# Patient Record
Sex: FEMALE | Race: WHITE | NOT HISPANIC OR LATINO | Employment: UNEMPLOYED | URBAN - NONMETROPOLITAN AREA
[De-identification: names, ages, dates, MRNs, and addresses within clinical notes are randomized per-mention and may not be internally consistent; named-entity substitution may affect disease eponyms.]

---

## 2017-02-14 ENCOUNTER — AMBULATORY - GICH (OUTPATIENT)
Dept: FAMILY MEDICINE | Facility: OTHER | Age: 57
End: 2017-02-14

## 2017-02-14 DIAGNOSIS — Z23 ENCOUNTER FOR IMMUNIZATION: ICD-10-CM

## 2017-04-26 ENCOUNTER — COMMUNICATION - GICH (OUTPATIENT)
Dept: FAMILY MEDICINE | Facility: OTHER | Age: 57
End: 2017-04-26

## 2017-04-26 DIAGNOSIS — N39.0 URINARY TRACT INFECTION: ICD-10-CM

## 2017-05-02 ENCOUNTER — COMMUNICATION - GICH (OUTPATIENT)
Dept: FAMILY MEDICINE | Facility: OTHER | Age: 57
End: 2017-05-02

## 2017-05-02 DIAGNOSIS — K21.9 GASTRO-ESOPHAGEAL REFLUX DISEASE WITHOUT ESOPHAGITIS: ICD-10-CM

## 2017-05-13 ENCOUNTER — COMMUNICATION - GICH (OUTPATIENT)
Dept: FAMILY MEDICINE | Facility: OTHER | Age: 57
End: 2017-05-13

## 2017-05-13 DIAGNOSIS — E78.00 PURE HYPERCHOLESTEROLEMIA: ICD-10-CM

## 2017-06-12 ENCOUNTER — COMMUNICATION - GICH (OUTPATIENT)
Dept: FAMILY MEDICINE | Facility: OTHER | Age: 57
End: 2017-06-12

## 2017-06-12 DIAGNOSIS — Z86.79 PERSONAL HISTORY OF OTHER DISEASES OF THE CIRCULATORY SYSTEM (CODE): ICD-10-CM

## 2017-07-12 ENCOUNTER — HISTORY (OUTPATIENT)
Dept: INTERNAL MEDICINE | Facility: OTHER | Age: 57
End: 2017-07-12

## 2017-07-12 ENCOUNTER — OFFICE VISIT - GICH (OUTPATIENT)
Dept: INTERNAL MEDICINE | Facility: OTHER | Age: 57
End: 2017-07-12

## 2017-07-12 DIAGNOSIS — K21.9 GASTRO-ESOPHAGEAL REFLUX DISEASE WITHOUT ESOPHAGITIS: ICD-10-CM

## 2017-07-12 DIAGNOSIS — J45.30 MILD PERSISTENT ASTHMA, UNCOMPLICATED: ICD-10-CM

## 2017-07-12 DIAGNOSIS — M15.0 PRIMARY GENERALIZED (OSTEO)ARTHRITIS: ICD-10-CM

## 2017-07-12 DIAGNOSIS — F41.8 OTHER SPECIFIED ANXIETY DISORDERS: ICD-10-CM

## 2017-07-12 DIAGNOSIS — K76.0 FATTY (CHANGE OF) LIVER, NOT ELSEWHERE CLASSIFIED: ICD-10-CM

## 2017-07-12 DIAGNOSIS — Z00.00 ENCOUNTER FOR GENERAL ADULT MEDICAL EXAMINATION WITHOUT ABNORMAL FINDINGS: ICD-10-CM

## 2017-07-12 DIAGNOSIS — E78.00 PURE HYPERCHOLESTEROLEMIA: ICD-10-CM

## 2017-07-12 DIAGNOSIS — G56.01 CARPAL TUNNEL SYNDROME OF RIGHT WRIST: ICD-10-CM

## 2017-07-12 DIAGNOSIS — Z79.899 OTHER LONG TERM (CURRENT) DRUG THERAPY: ICD-10-CM

## 2017-07-12 DIAGNOSIS — Z86.79 PERSONAL HISTORY OF OTHER DISEASES OF THE CIRCULATORY SYSTEM (CODE): ICD-10-CM

## 2017-07-12 LAB
A/G RATIO - HISTORICAL: 1.7 (ref 1–2)
ALBUMIN SERPL-MCNC: 4.5 G/DL (ref 3.5–5.7)
ALP SERPL-CCNC: 79 IU/L (ref 34–104)
ALT (SGPT) - HISTORICAL: 44 IU/L (ref 7–52)
ANION GAP - HISTORICAL: 11 (ref 5–18)
AST SERPL-CCNC: 27 IU/L (ref 13–39)
BILIRUB SERPL-MCNC: 0.4 MG/DL (ref 0.3–1)
BUN SERPL-MCNC: 17 MG/DL (ref 7–25)
BUN/CREAT RATIO - HISTORICAL: 25
CALCIUM SERPL-MCNC: 9.7 MG/DL (ref 8.6–10.3)
CHLORIDE SERPLBLD-SCNC: 102 MMOL/L (ref 98–107)
CHOL/HDL RATIO - HISTORICAL: 3.52
CHOLESTEROL TOTAL: 243 MG/DL
CO2 SERPL-SCNC: 26 MMOL/L (ref 21–31)
CREAT SERPL-MCNC: 0.67 MG/DL (ref 0.7–1.3)
GFR IF NOT AFRICAN AMERICAN - HISTORICAL: >60 ML/MIN/1.73M2
GLOBULIN - HISTORICAL: 2.7 G/DL (ref 2–3.7)
GLUCOSE SERPL-MCNC: 109 MG/DL (ref 70–105)
HDLC SERPL-MCNC: 69 MG/DL (ref 23–92)
LDLC SERPL CALC-MCNC: 150 MG/DL
MAGNESIUM SERPL-MCNC: 2.1 MG/DL (ref 1.9–2.7)
NON-HDL CHOLESTEROL - HISTORICAL: 174 MG/DL
PATIENT STATUS - HISTORICAL: ABNORMAL
POTASSIUM SERPL-SCNC: 4.7 MMOL/L (ref 3.5–5.1)
PROT SERPL-MCNC: 7.2 G/DL (ref 6.4–8.9)
SODIUM SERPL-SCNC: 139 MMOL/L (ref 133–143)
TRIGL SERPL-MCNC: 120 MG/DL

## 2017-07-12 ASSESSMENT — ANXIETY QUESTIONNAIRES
1. FEELING NERVOUS, ANXIOUS, OR ON EDGE: MORE THAN HALF THE DAYS
6. BECOMING EASILY ANNOYED OR IRRITABLE: NOT AT ALL
3. WORRYING TOO MUCH ABOUT DIFFERENT THINGS: MORE THAN HALF THE DAYS
2. NOT BEING ABLE TO STOP OR CONTROL WORRYING: SEVERAL DAYS
7. FEELING AFRAID AS IF SOMETHING AWFUL MIGHT HAPPEN: SEVERAL DAYS
5. BEING SO RESTLESS THAT IT IS HARD TO SIT STILL: SEVERAL DAYS
4. TROUBLE RELAXING: SEVERAL DAYS
GAD7 TOTAL SCORE: 8

## 2017-07-12 ASSESSMENT — PATIENT HEALTH QUESTIONNAIRE - PHQ9: SUM OF ALL RESPONSES TO PHQ QUESTIONS 1-9: 5

## 2017-08-11 ENCOUNTER — COMMUNICATION - GICH (OUTPATIENT)
Dept: FAMILY MEDICINE | Facility: OTHER | Age: 57
End: 2017-08-11

## 2017-08-11 DIAGNOSIS — E78.00 PURE HYPERCHOLESTEROLEMIA: ICD-10-CM

## 2017-12-09 ENCOUNTER — COMMUNICATION - GICH (OUTPATIENT)
Dept: INTERNAL MEDICINE | Facility: OTHER | Age: 57
End: 2017-12-09

## 2017-12-09 DIAGNOSIS — Z86.79 PERSONAL HISTORY OF OTHER DISEASES OF THE CIRCULATORY SYSTEM (CODE): ICD-10-CM

## 2017-12-28 NOTE — TELEPHONE ENCOUNTER
Patient Information     Patient Name MRN Sex Nikki Long [id number] Female 1960      Telephone Encounter by Catarina Stock RN at 2017  9:00 AM     Author:  Catarina Stock RN Service:  (none) Author Type:  NURS- Registered Nurse     Filed:  2017  9:02 AM Encounter Date:  2017 Status:  Signed     :  Catarina Stock RN (NURS- Registered Nurse)            Beta Blockers     Office visit in the past 12 months or per provider note.    Last visit with JENNIFER BENNETT was on: 2016 in [facility]  Next visit with JENNIFER BENNETT is on: No future appointment listed with this provider  Next visit with Family Practice is on: No future appointment listed in this department    Max refill for 12 months from last office visit or per provider note.    Patient is due for medication management appointment. Limited refill provided at this time. [employer] message and/or letter recently sent for reminder to patient. Prescription refilled per RN Medication Refill Policy.................... Catarina Stock RN ....................  2017   9:01 AM

## 2017-12-28 NOTE — PROGRESS NOTES
Patient Information     Patient Name MRN Sex Nikki Long [id number] Female 1960      Progress Notes by Noris Medellin DO at 2017  8:20 AM     Author:  Noris Medellin DO Service:  (none) Author Type:  PHYS- Osteopathic     Filed:  2017  7:58 AM Encounter Date:  2017 Status:  Signed     :  Noris Medellin DO (PHYS- Osteopathic)            Please see H&P from same date.

## 2017-12-28 NOTE — TELEPHONE ENCOUNTER
Patient Information     Patient Name MRN Nikki Hector [id number] Female 1960      Telephone Encounter by Catarina Stock RN at 8/15/2017  8:31 AM     Author:  Catarina Stock RN Service:  (none) Author Type:  NURS- Registered Nurse     Filed:  8/15/2017  8:39 AM Encounter Date:  2017 Status:  Signed     :  Catarina Stock RN (NURS- Registered Nurse)            Redundant Refill Request refused;  Medication:atorvastatin (LIPITOR) 20 mg tablet    Qty:90 tablet   Ref:4  Start:2017       Sig:Take 1 tablet by mouth once daily.    Pharmacy:[place] [id number] [place] [facility], [place] - [address]  Unable to complete prescription refill per RN Medication Refill Policy.................... Catarina Stock RN ....................  8/15/2017   8:33 AM

## 2017-12-29 NOTE — H&P
Patient Information     Patient Name MRN Nikki Hector [id number] Female 1960      H&P by Noris Medellin DO at 2017  8:20 AM     Author:  Noris Medellin DO Service:  (none) Author Type:  PHYS- Osteopathic     Filed:  2017  7:58 AM Encounter Date:  2017 Status:  Signed     :  Noris Medellin DO (PHYS- Osteopathic)            Chief Complaint     Patient presents with       Establish Care      with Dr. Medellin       HPI: Ms. Lezama is a 57 y.o. female who presents today for yearly physical and to establish care.  She overall is feeling good.      She has been having some increased pain in her hands.  She does believe this is arthritis.  She has not been taking much for medication for this.  Does report some numbness in the first 3 and a half digits of her right hand when she rides bike.  This started in the last month.  She denies any outright weakness.    In reviewing her medical issues,    (K76.0) Fatty liver disease, nonalcoholic - she does have a history of fatty liver disease with her last enzymes showing elevation of AST and ALT.  She is interested in weight loss overall.  She has worked with this in the past.  She does recognize she needs to watch her diet.  She is not interested in seeing nutrition at this time.    (K21.9) Gastroesophageal reflux disease, esophagitis presence not specified - she is currently on omeprazole.  She is not interested in stopping at this time as she has had problems with this in the past.  She is aware of the increased risk including memory, kidney or absorption issues.  She is willing to continue to consider decrease in the future.    (Z86.79) History of supraventricular tachycardia - she is currently on metoprolol for this.  She did have an episode of SVT in the last couple weeks.  It did spontaneously resolved.  She denies any other symptoms including chest pain or shortness of breath with this.    (F41.8) Situational anxiety - she does have some  anxiety with flying.  She will have the flight coming up this fall and is a curious about refilling her Xanax.    (J45.30) Mild persistent asthma without complication - she is currently on as needed albuterol along with montelukast.  She denies any side effects at this time.  She did recently have a cold however is recovering.    (E78.00) Pure hypercholesterolemia - started on low-dose Lipitor in the last year.  She has not had repeat lipids done yet.  She does feel she has some achiness but does not know if this is from the atorvastatin.  She denies any other side effects.    She is up-to-date on her colonoscopy.  She is up-to-date on her vaccines however will need tetanus and shingles at age 58.  She is unable to do mammogram scheduled of her history of Lino's and implants.  She has previously done MRIs.  We will plan to repeat this at age 58.  She is up-to-date on her Pap smear.    History is discussed and updated on 7/12/2017 with patient.  It is current to the best of my knowledge as below.    Past Medical History:     Diagnosis  Date     Amblyopia 1962    left eye; patching and vision therapy.      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      History of supraventricular tachycardia 1/12/2007    controlled with metoprolol      Hyperlipidemia 6/2/2014     Mechanical complication due to breast prosthesis 2/29/2008    ruptured implants      Mild persistent asthma without complication 2/29/2008     Perennial allergic rhinitis 1/12/2007     Primary female infertility     gonadal dysgenesis      Sensorineural hearing loss, asymmetrical 10/02/2008    left ear      Squamous cell cancer of skin of nose 2004        Past Surgical History:      Procedure  Laterality Date     BREAST AUGMENTATION       BREAST  BIOPSY  1980,1982    benign       BUNIONETTE EXCISION       COLONOSCOPY SCREENING  4/19/2010    hemorrhoids o/w nl to TI;rep 10yrs       CYST REMOVAL       LIPOSUCTION       MIDDLE EAR SURGERY       OOPHORECTOMY      streak ovaries       SKIN CANCER EXCISION       TOE SURGERY      tumor removal from 1st toe on right       TONSILLECTOMY           Current Outpatient Prescriptions     Medication  Sig     albuterol HFA (VENTOLIN HFA) 90 mcg/actuation inhaler Inhale 2 Puffs by mouth every 4 hours if needed.     ALPRAZolam (XANAX) 0.5 mg tablet Take 1 tablet by mouth 2 times daily if needed for Anxiety or Other (Specify) (for flying).     aspirin enteric coated 81 mg tablet Take 1 tablet by mouth once daily with a meal.     atorvastatin (LIPITOR) 20 mg tablet Take 1 tablet by mouth once daily.     coenzyme q10 (COQ-10) 100 mg cap Take 1 capsule by mouth once daily.     fexofenadine (ALLEGRA) 180 mg tablet Take 1 tablet by mouth once daily.     Glucosamine &Chondroit-MV-Min3 014-179-07-0.5 mg tab Take 1 tablet by mouth once daily.     Inhalational Spacing Device (AEROCHAMBER MINI) For home use.     krill oil 500 mg cap Take  by mouth.     lactobacillus acidophilus (PROBIOTIC) 10 billion cell cap Take 1 capsule by mouth.     metoprolol succinate (TOPROL XL) 25 mg Sustained-Release tablet Take 1 tablet by mouth once daily.     montelukast (SINGULAIR) 10 mg tablet Take 1 tablet by mouth once daily.     multivitamin (MVI) tablet Take 1 tablet by mouth once daily.     omeprazole (PRILOSEC) 20 mg Delayed-Release capsule Take 1 capsule by mouth once daily before a meal.     No current facility-administered medications for this visit.      Medications have been reviewed by me and are current to the best of my knowledge and ability.       Allergies      Allergen   Reactions     Cats (Fur, Dander, Saliva)  Runny Nose     Dust Mites  Runny Nose and Other - Describe In Comment Field     Sneezing, watery eyes      Ragweed  Runny Nose      Sneezing, watery eyes         Family History       Problem   Relation Age of Onset     Thyroid Disease  Mother      Heart Disease  Mother      Cancer  Mother      skin       Diabetes  Mother      Hyperlipidemia  Mother      Hypertension  Mother      Stroke  Mother      Diabetes  Father      Heart Disease  Father      Hyperlipidemia  Father      Hypertension  Father      Stroke  Father 73     Heart Disease  Brother      triple bypass        Heart Disease  Brother      MI       Diabetes  Sister      Obesity  Sister      Diabetes  Sister      Obesity  Sister      Heart Disease  Sister      Asthma  Sister      Hypertension  Sister      Diabetes  Maternal Grandmother      Stroke  Maternal Grandmother      Diabetes  Paternal Grandmother      Stroke  Paternal Grandfather      Anesthesia Problem  No Family History      Blood Disease  No Family History        Family Status     Relation  Status     Mother  at age 82    heart disease, CVA, dementia, DM      Father  at age 74    DM. CAD, CVA      Brother Alive    CABG, HTN, acoustic neuroma      Brother Alive    stress, overweight      Sister Alive    obesity, scoliosis, heart disease, DM, HTN      Sister Alive    obesity, DM      Sister Alive    asthma, reflux, colitis      Maternal Grandmother      Paternal Grandmother      Paternal Grandfather      No Family History         Social History     Social History        Marital status:       Spouse name: N/A     Number of children:  N/A     Years of education:  N/A     Social History Main Topics          Smoking status:   Never Smoker      Smokeless tobacco:   Never Used      Alcohol use   0.0 oz/week     0 Standard drinks or equivalent per week        Comment: 3 per week       Drug use:   No      Sexual activity:   Yes      Partners:  Male       Comment: monogamous       Other Topics  Concern     Not on file      Social History Narrative     Moved to [place] 2014. 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    2 adopted children with special needs.                      ROS  GEN: -fevers/-chills/-night sweats/+wt change - 5 pound weight gain in the past year   NEURO: -headaches/-vision changes  EARS: -hearing changes/+ chronic tinnitus  NOSE: -drainage/-congestion  MOUTH/THROAT: - sore throat/-dysphagia/-sores  LUNGS: + Occasional sob/-cough  CARDIOVASCULAR: -cp/-palpitations  GI: -pain/-diarrhea/-constipation/-bloody stools  : -dysuria/-hematuria  ENDOCRINE: -skin or hair changes/-hot-cold intolerance  HEMATOLOGIC/LYMPHATIC: -swollen nodes/-easy bruising  SKIN: -rashes/-lesions  MSK/RHEUM: +joint pain/-swelling  NEURO: -weakness/-parasthesias  PSYCH: -anhedonia/-depression/-anxiety         EXAM:   /76  Pulse 72  Temp 97.6  F (36.4  C) (Tympanic)  Resp 18  Ht 1.556 m (5' 1.25\")  Wt 79.4 kg (175 lb)  Breastfeeding? No  BMI 32.8 kg/m2  Estimated body mass index is 32.8 kg/(m^2) as calculated from the following:    Height as of this encounter: 1.556 m (5' 1.25\").    Weight as of this encounter: 79.4 kg (175 lb).      GEN: Vitals reviewed.  Healthy appearing. Patient is in no acute distress. Cooperative with exam.  HEENT: Normocephalic atraumatic.  Normal external eye, conjunctiva, lids, cornea with no scleral icterus or conjunctival erythema. Pupils equally round. Oropharynx with no erythema or exudates. Dentition adequate.    NECK: Supple; no thyromegaly or masses noted.  No cervical or supraclavicular lymphadenopathy.  CV: Heart regular in rate and rhythm with no murmur.  Radial and posterior tibial pulses palpable.  LUNGS: Lungs clear to auscultation bilaterally.  Chest rise equal bilaterally.  No accessory muscle use.  ABD:  Obese, Soft, nondistended .  No rebound. Bowel sounds positive.  SKIN: Warm and dry to touch.  No rash on face, arms and legs.  EXT: No clubbing or cyanosis.  No peripheral edema.  NEURO: Alert and oriented to person, place, and time.  Cranial nerves II-XII grossly intact with " no focal or lateralizing deficits.  Muscle tone normal.  Gait normal. No tremor. Sensation intact to light touch.  MSK: ROM of upper and lower ext symmetric and full.  PSYCH: Affect appropriate. Speech fluent. Answers questions appropriately and thought process normal.    LABS: 7/12/2017 - Personally ordered/reviewed  Results for orders placed or performed in visit on 07/12/17      LIPID PANEL      Result  Value Ref Range    CHOLESTEROL,TOTAL 243 (H) <200 mg/dL    TRIGLYCERIDES 120 <150 mg/dL    HDL CHOLESTEROL 69 23 - 92 mg/dL    NON-HDL CHOLESTEROL 174 (H) <145 mg/dl    CHOL/HDL RATIO            3.52 <4.50                    LDL CHOLESTEROL 150 (H) <100 mg/dL    PATIENT STATUS            FASTING                   COMP METABOLIC PANEL      Result  Value Ref Range    SODIUM 139 133 - 143 mmol/L    POTASSIUM 4.7 3.5 - 5.1 mmol/L    CHLORIDE 102 98 - 107 mmol/L    CO2,TOTAL 26 21 - 31 mmol/L    ANION GAP 11 5 - 18                    GLUCOSE 109 (H) 70 - 105 mg/dL    CALCIUM 9.7 8.6 - 10.3 mg/dL    BUN 17 7 - 25 mg/dL    CREATININE 0.67 (L) 0.70 - 1.30 mg/dL    BUN/CREAT RATIO           25                    GFR if African American >60 >60 ml/min/1.73m2    GFR if not African American >60 >60 ml/min/1.73m2    ALBUMIN 4.5 3.5 - 5.7 g/dL    PROTEIN,TOTAL 7.2 6.4 - 8.9 g/dL    GLOBULIN                  2.7 2.0 - 3.7 g/dL    A/G RATIO 1.7 1.0 - 2.0                    BILIRUBIN,TOTAL 0.4 0.3 - 1.0 mg/dL    ALK PHOSPHATASE 79 34 - 104 IU/L    ALT (SGPT) 44 7 - 52 IU/L    AST (SGOT) 27 13 - 39 IU/L   MAGNESIUM      Result  Value Ref Range    MAGNESIUM 2.1 1.9 - 2.7 mg/dL             ASSESSMENT AND PLAN:    1. Fatty liver disease, nonalcoholic  - liver enzymes appear to have improved at this time.  We will continue to monitor at least yearly.  - COMP METABOLIC PANEL    2. Gastroesophageal reflux disease, esophagitis presence not specified  - Continue medication as prescribed  - Discussed the need to taper off of PPI when able  and discussed alternatives including Tums and H2 blocker  - Encouraged to avoid caffeine, NSAIDS, chocolate, alcohol, spicy food, red sauce; consider raising the head of bed 10-15 degrees; do not eat within 2-3 hours of bedtime  - Return/call as needed for follow-up should any new symptoms develop, for worsening of current symptoms or if symptoms do not resolve with above plan.  - omeprazole (PRILOSEC) 20 mg Delayed-Release capsule; Take 1 capsule by mouth once daily before a meal.  Dispense: 90 capsule; Refill: 4    3. History of supraventricular tachycardia  - Stable.  Continue current regimen.    - metoprolol succinate (TOPROL XL) 25 mg Sustained-Release tablet; Take 1 tablet by mouth once daily.  Dispense: 90 tablet; Refill: 0    4. Situational anxiety  - Stable.  Continue current regimen.    - ALPRAZolam (XANAX) 0.5 mg tablet; Take 1 tablet by mouth 2 times daily if needed for Anxiety or Other (Specify) (for flying).  Dispense: 10 tablet; Refill: 0    5. Mild persistent asthma without complication  - no indication of exacerbation at this time  - patient is on meds and Inhalers, continue for now  - montelukast (SINGULAIR) 10 mg tablet; Take 1 tablet by mouth once daily.  Dispense: 90 tablet; Refill: 4    6. Pure hypercholesterolemia  - lipids checked and good  - On statin with no clinical evidence or complaint of myalgias or other ADRs  - Discussed importance of exercise and diet.  - Ms. Lezama's Body mass index is 32.8 kg/(m^2). This is out of the normal range for a 57 y.o. Normal range for ages 18+ is between 18.5 and 24.9. To lose weight we reviewed risks and benefits of appropriate options such as diet, exercise, and medications. Patient's strategy will be  self-directed nutrition plan and self-directed exercise program  - LIPID PANEL  - atorvastatin (LIPITOR) 20 mg tablet; Take 1 tablet by mouth once daily.  Dispense: 90 tablet; Refill: 4    7. Primary osteoarthritis involving multiple joints  -  over-the-counter medications as needed.    8. Carpal tunnel syndrome of right wrist  - Ice, gentle movement/rest as tolerated  - Ibuprofen, Naproxen or Tylenol as needed  - exercises given.  Patient is to call she feel she needs further intervention with physical therapy  - she was informed that she could use a brace if she desired.  - patient is to call if she has additional problems with this or if new symptoms develop    9. High risk medication use  - check magnesium due to use of proton pump inhibitor  - MAGNESIUM        Colonoscopy:  up to date  Breast Exam/Mammography: Repeat MRI in 1 year  Pap smear: Never any abnormal, repeat 2021  DEXA: 865  Immunizations: UTD on other vaccines.   Lipids/Annual Exam: As needed to manage cholesterol  Hepatitis C screen: Nonreactive 2016            Return in about 1 year (around 7/12/2018) for Annual Exam.         Patient Instructions      Index [place] Exercises Related topics   Carpal Tunnel Syndrome   ________________________________________________________________________  KEY POINTS    Carpal tunnel syndrome is problem with pain, numbness, and tingling in your wrist, hand, and fingers. Carpal tunnel syndrome is a common problem for cashiers, , assembly-line workers, and people who work on a keyboard or with a computer mouse.    You may need to stop doing activities that make your symptoms worse.    Treatment may include stretching and strengthening exercises, medicine, wearing a wrist splint, or surgery.    Your healthcare provider may recommend stretching and strengthening exercises to help you heal.  ________________________________________________________________________  What is carpal tunnel syndrome?   Carpal tunnel syndrome is problem with pain, numbness, and tingling in your wrist, hand, and fingers. The carpal tunnel is a narrow space in your forearm and the palm of your hand. It is made up of bone and other tissue. The median nerve passes through the  carpal tunnel to your thumb, index finger, and middle finger.  What is the cause?  The exact cause of carpal tunnel syndrome is not known. It is more common in women than in men.  It may be that pressure, irritation, swelling, or blood flow problems in the carpal tunnel damage the median nerve. Irritation and pressure may come from using your hand and wrist in the same motion over and over. For example, carpal tunnel syndrome is a common problem for cashiers, , assembly-line workers, and people who work on a keyboard or with a computer mouse. Or a broken bone or other injury may damage or put pressure on the nerve.   You may have a higher risk of carpal tunnel syndrome if someone in your family has this problem, or if you are pregnant or have a disease like rheumatoid arthritis, diabetes, or a thyroid problem. Some medicines used to treat breast cancer in women may cause carpal tunnel syndrome.  What are the symptoms?   The main symptoms are pain, numbness, or tingling in your hand and wrist, especially in the thumb and the index and middle fingers. The pain may:    Come and go    Get worse the more you use your hand    Be worse at night    Feel better if you shake your hand and wrist  Your hand may feel weak. It may be hard for you to grab things and hold onto them with your hand. You may have trouble knowing if something is hot or cold when you touch it.   How is it diagnosed?   Your healthcare provider will ask about your symptoms, medical history, and the ways you use your hands. Your provider will examine you. He or she may tap the middle of your inner wrist or ask you to bend your wrist down for 1 minute to see if either of these tests causes pain or tingling. Your provider may refer you to a specialist for tests to check your nerves. In some cases you may have an ultrasound or MRI scan.  How is it treated?   If you have a disease that may be causing carpal tunnel syndrome, like arthritis, diabetes,  or a thyroid problem, treating the disease may help your symptoms.  To relieve pressure on the nerve your healthcare provider may suggest that you:    Rest your hand and wrist and avoid activities that may make your symptoms worse.    Wear a wrist splint to avoid more damage from twisting or bending.  In some cases your provider may prescribe medicine for pain and swelling or give a shot of steroid or numbing medicine into your wrist.   Your healthcare provider may recommend stretching and strengthening exercises to help you heal.   Your provider may recommend surgery if your symptoms don t get better with these treatments.  How can I take care of myself?   Some of the things you can do to help your symptoms include:    Put your arm up on pillows when you sit or lie down.    If your work involves using tools, try using a different tool, or try to use the other hand instead.  Follow your healthcare provider's instructions, including any exercises recommended by your provider. Ask your provider:    How and when you will get your test results    How long it will take to recover    If there are activities you should avoid and when you can return to your normal activities    How to take care of yourself at home    What symptoms or problems you should watch for and what to do if you have them  Make sure you know when you should come back for a checkup. Keep all appointments for provider visits or tests.  How can I help prevent carpal tunnel syndrome?  Because the exact cause of carpal tunnel is not well understood, it can be hard to prevent. Here are some things you can do that may help:    Make sure that your hands and wrists are supported when you use them, especially if you do repetitive work. For example, when you are typing or using a mouse at a computer, make sure your workstation is set up in the proper position and that your hands are comfortably supported in front of the keyboard. It may help to use a pad that is  specially designed to give this kind of support. Also check to see that your forearms are at the same level as your keyboard.    Make sure that the tools you use are not too large for your hand. Try to use tools with a cushioned , or wear gloves when you use them. Some workplaces provide tools or equipment that may help you avoid carpal tunnel syndrome. Ask your employer about this.    Take frequent breaks from your work and do carpal tunnel exercises.  It s not clear, however, that making these changes will help prevent carpal tunnel syndrome.  Developed by [employer].  Adult Advisor 2016.3 published by [employer].  Last modified: 2016-05-25  Last reviewed: 2014-10-06  This content is reviewed periodically and is subject to change as new health information becomes available. The information is intended to inform and educate and is not a replacement for medical evaluation, advice, diagnosis or treatment by a healthcare professional.  References   Adult Advisor 2016.3 Index    Copyright   2016 [employer], a division of [employer] All rights reserved.          Index [place]   Carpal Tunnel Syndrome Exercises   Your healthcare provider may recommend exercises to help you heal. Talk to your healthcare provider or physical therapist about which exercises will best help you and how to do them correctly and safely.    Wrist range of motion  1. Flexion and extension: Gently bend your wrist forward and backward. Do 2 sets of 10.  2. Side to side: Gently move your wrist from side to side (a handshake motion). Do 2 sets of 10.    Wrist stretch: Stretch the hand on your injured side back by pressing the fingers in a backward direction. Hold for 15 seconds. Keep the arm on your injured side straight during this exercise. Do 3 sets.    Mid-trap exercise: Lie on your stomach on a firm surface and place a folded pillow underneath your chest. Place your arms out straight to your sides with your elbows straight  and thumbs toward the ceiling. Slowly raise your arms toward the ceiling as you squeeze your shoulder blades together. Lower slowly. Do 2 sets of 15. As the exercise gets easier to do, hold soup cans or small weights in your hands.    Pectoralis stretch:  an open doorway or corner with both hands slightly above your head on the door frame or wall. Slowly lean forward until you feel a stretch in the front of your shoulders. Hold for 15 to 30 seconds. Repeat 3 times.    [place] stretch: Sit or stand and clasp both hands behind your back. Lower your left shoulder and tilt your head toward the right until you feel a stretch. Hold this position for 15 seconds and then come back to the starting position. Then lower your right shoulder and tilt your head toward the left. Hold for 15 seconds. Repeat 2 times on each side.    Thoracic extension: Sit in a chair and clasp both arms behind your head. Gently arch backward and look up toward the ceiling. Repeat 10 times. Do this several times each day.    Scapular squeeze: While sitting or standing with your arms by your sides, squeeze your shoulder blades together and hold for 5 seconds. Do 2 sets of 15.    Wrist extension: Hold a soup can or small weight in your hand with your palm facing down. Slowly bend your wrist up. Slowly lower the weight down into the starting position. Do 2 sets of 10. Gradually increase the weight of the object you are holding.    Shoulder abduction: Stand with your arms at your sides. Rest your palms against your sides. Hold a 2 to 4-pound (1 to 2 kilogram) weight in each hand. Keeping your arms straight, lift your arms out to the side and toward the ceiling. Hold this position for 5 seconds and then slowly bring your arms down. Do 2 sets of 8 to 12.    Tendon glide: Do these exercises 3 times per day, 5 repetitions each.  1. Hold your hand with your fingers straight and your palm facing away from you. Hold this position for 5 seconds.  2. Flex  the tips of your fingers and try to make a  claw  position. Hold 5 seconds.  3. Return to Step 1 and hold 5 seconds.  4. Make a full fist with your fingers. Hold 5 seconds.  5. Return to Step 1 and hold 5 seconds.  6. Keep your fingers straight, but bend only your knuckles, trying to make a duck-bill shape with your hand. Hold 5 seconds.  7. Return to Step 1 and hold 5 seconds.  8. Try to touch only your finger tips to your palm without making a full fist. Hold 5 seconds.  9. Return to Step 1.  Developed by [employer].  Adult Advisor 2016.3 published by [employer].  Last modified: 2016-04-28  Last reviewed: 2016-04-28  This content is reviewed periodically and is subject to change as new health information becomes available. The information is intended to inform and educate and is not a replacement for medical evaluation, advice, diagnosis or treatment by a healthcare professional.  References   Adult Advisor 2016.3 Index    Copyright   2016 [employer], a division of [employer] All rights reserved.               ULISSES PIRES DO   7/12/2017 9:20 AM    This document was prepared using voice generated softwear. While every attempt was made for accuracy, grammatical errors may exist.

## 2017-12-29 NOTE — PATIENT INSTRUCTIONS
Patient Information     Patient Name MRN Nikki Hector [id number] Female 1960      Patient Instructions by Noris Medellin DO at 2017  8:20 AM     Author:  Noris Medellin DO Service:  (none) Author Type:  PHYS- Osteopathic     Filed:  2017  9:09 AM Encounter Date:  2017 Status:  Signed     :  Noris Medellin DO (PHYS- Osteopathic)               Index Malaysian Exercises Related topics   Carpal Tunnel Syndrome   ________________________________________________________________________  KEY POINTS    Carpal tunnel syndrome is problem with pain, numbness, and tingling in your wrist, hand, and fingers. Carpal tunnel syndrome is a common problem for cashiers, , assembly-line workers, and people who work on a keyboard or with a computer mouse.    You may need to stop doing activities that make your symptoms worse.    Treatment may include stretching and strengthening exercises, medicine, wearing a wrist splint, or surgery.    Your healthcare provider may recommend stretching and strengthening exercises to help you heal.  ________________________________________________________________________  What is carpal tunnel syndrome?   Carpal tunnel syndrome is problem with pain, numbness, and tingling in your wrist, hand, and fingers. The carpal tunnel is a narrow space in your forearm and the palm of your hand. It is made up of bone and other tissue. The median nerve passes through the carpal tunnel to your thumb, index finger, and middle finger.  What is the cause?  The exact cause of carpal tunnel syndrome is not known. It is more common in women than in men.  It may be that pressure, irritation, swelling, or blood flow problems in the carpal tunnel damage the median nerve. Irritation and pressure may come from using your hand and wrist in the same motion over and over. For example, carpal tunnel syndrome is a common problem for cashiers, , assembly-line workers, and people  who work on a keyboard or with a computer mouse. Or a broken bone or other injury may damage or put pressure on the nerve.   You may have a higher risk of carpal tunnel syndrome if someone in your family has this problem, or if you are pregnant or have a disease like rheumatoid arthritis, diabetes, or a thyroid problem. Some medicines used to treat breast cancer in women may cause carpal tunnel syndrome.  What are the symptoms?   The main symptoms are pain, numbness, or tingling in your hand and wrist, especially in the thumb and the index and middle fingers. The pain may:    Come and go    Get worse the more you use your hand    Be worse at night    Feel better if you shake your hand and wrist  Your hand may feel weak. It may be hard for you to grab things and hold onto them with your hand. You may have trouble knowing if something is hot or cold when you touch it.   How is it diagnosed?   Your healthcare provider will ask about your symptoms, medical history, and the ways you use your hands. Your provider will examine you. He or she may tap the middle of your inner wrist or ask you to bend your wrist down for 1 minute to see if either of these tests causes pain or tingling. Your provider may refer you to a specialist for tests to check your nerves. In some cases you may have an ultrasound or MRI scan.  How is it treated?   If you have a disease that may be causing carpal tunnel syndrome, like arthritis, diabetes, or a thyroid problem, treating the disease may help your symptoms.  To relieve pressure on the nerve your healthcare provider may suggest that you:    Rest your hand and wrist and avoid activities that may make your symptoms worse.    Wear a wrist splint to avoid more damage from twisting or bending.  In some cases your provider may prescribe medicine for pain and swelling or give a shot of steroid or numbing medicine into your wrist.   Your healthcare provider may recommend stretching and strengthening  exercises to help you heal.   Your provider may recommend surgery if your symptoms don t get better with these treatments.  How can I take care of myself?   Some of the things you can do to help your symptoms include:    Put your arm up on pillows when you sit or lie down.    If your work involves using tools, try using a different tool, or try to use the other hand instead.  Follow your healthcare provider's instructions, including any exercises recommended by your provider. Ask your provider:    How and when you will get your test results    How long it will take to recover    If there are activities you should avoid and when you can return to your normal activities    How to take care of yourself at home    What symptoms or problems you should watch for and what to do if you have them  Make sure you know when you should come back for a checkup. Keep all appointments for provider visits or tests.  How can I help prevent carpal tunnel syndrome?  Because the exact cause of carpal tunnel is not well understood, it can be hard to prevent. Here are some things you can do that may help:    Make sure that your hands and wrists are supported when you use them, especially if you do repetitive work. For example, when you are typing or using a mouse at a computer, make sure your workstation is set up in the proper position and that your hands are comfortably supported in front of the keyboard. It may help to use a pad that is specially designed to give this kind of support. Also check to see that your forearms are at the same level as your keyboard.    Make sure that the tools you use are not too large for your hand. Try to use tools with a cushioned , or wear gloves when you use them. Some workplaces provide tools or equipment that may help you avoid carpal tunnel syndrome. Ask your employer about this.    Take frequent breaks from your work and do carpal tunnel exercises.  It s not clear, however, that making these  changes will help prevent carpal tunnel syndrome.  Developed by [employer].  Adult Advisor 2016.3 published by [employer].  Last modified: 2016-05-25  Last reviewed: 2014-10-06  This content is reviewed periodically and is subject to change as new health information becomes available. The information is intended to inform and educate and is not a replacement for medical evaluation, advice, diagnosis or treatment by a healthcare professional.  References   Adult Advisor 2016.3 Index    Copyright   2016 [employer], a division of [employer] All rights reserved.          Index Portuguese   Carpal Tunnel Syndrome Exercises   Your healthcare provider may recommend exercises to help you heal. Talk to your healthcare provider or physical therapist about which exercises will best help you and how to do them correctly and safely.    Wrist range of motion  1. Flexion and extension: Gently bend your wrist forward and backward. Do 2 sets of 10.  2. Side to side: Gently move your wrist from side to side (a handshake motion). Do 2 sets of 10.    Wrist stretch: Stretch the hand on your injured side back by pressing the fingers in a backward direction. Hold for 15 seconds. Keep the arm on your injured side straight during this exercise. Do 3 sets.    Mid-trap exercise: Lie on your stomach on a firm surface and place a folded pillow underneath your chest. Place your arms out straight to your sides with your elbows straight and thumbs toward the ceiling. Slowly raise your arms toward the ceiling as you squeeze your shoulder blades together. Lower slowly. Do 2 sets of 15. As the exercise gets easier to do, hold soup cans or small weights in your hands.    Pectoralis stretch:  an open doorway or corner with both hands slightly above your head on the door frame or wall. Slowly lean forward until you feel a stretch in the front of your shoulders. Hold for 15 to 30 seconds. Repeat 3 times.    Scalene stretch: Sit or  stand and clasp both hands behind your back. Lower your left shoulder and tilt your head toward the right until you feel a stretch. Hold this position for 15 seconds and then come back to the starting position. Then lower your right shoulder and tilt your head toward the left. Hold for 15 seconds. Repeat 2 times on each side.    Thoracic extension: Sit in a chair and clasp both arms behind your head. Gently arch backward and look up toward the ceiling. Repeat 10 times. Do this several times each day.    Scapular squeeze: While sitting or standing with your arms by your sides, squeeze your shoulder blades together and hold for 5 seconds. Do 2 sets of 15.    Wrist extension: Hold a soup can or small weight in your hand with your palm facing down. Slowly bend your wrist up. Slowly lower the weight down into the starting position. Do 2 sets of 10. Gradually increase the weight of the object you are holding.    Shoulder abduction: Stand with your arms at your sides. Rest your palms against your sides. Hold a 2 to 4-pound (1 to 2 kilogram) weight in each hand. Keeping your arms straight, lift your arms out to the side and toward the ceiling. Hold this position for 5 seconds and then slowly bring your arms down. Do 2 sets of 8 to 12.    Tendon glide: Do these exercises 3 times per day, 5 repetitions each.  1. Hold your hand with your fingers straight and your palm facing away from you. Hold this position for 5 seconds.  2. Flex the tips of your fingers and try to make a  claw  position. Hold 5 seconds.  3. Return to Step 1 and hold 5 seconds.  4. Make a full fist with your fingers. Hold 5 seconds.  5. Return to Step 1 and hold 5 seconds.  6. Keep your fingers straight, but bend only your knuckles, trying to make a duck-bill shape with your hand. Hold 5 seconds.  7. Return to Step 1 and hold 5 seconds.  8. Try to touch only your finger tips to your palm without making a full fist. Hold 5 seconds.  9. Return to Step  1.  Developed by [employer].  Adult Advisor 2016.3 published by [employer].  Last modified: 2016-04-28  Last reviewed: 2016-04-28  This content is reviewed periodically and is subject to change as new health information becomes available. The information is intended to inform and educate and is not a replacement for medical evaluation, advice, diagnosis or treatment by a healthcare professional.  References   Adult Advisor 2016.3 Index    Copyright   2016 [employer], a division of [employer] All rights reserved.

## 2017-12-30 NOTE — NURSING NOTE
Patient Information     Patient Name MRN Sex Nikki Long [id number] Female 1960      Nursing Note by Tahira Rush at 2017  8:20 AM     Author:  Tahira Rush Service:  (none) Author Type:  (none)     Filed:  2017  8:35 AM Encounter Date:  2017 Status:  Signed     :  Tahira Rush            Patient presents to clinic for establish care appointment with DO. Tahira Cho LPN..................2017  8:30 AM

## 2018-01-03 NOTE — NURSING NOTE
Patient Information     Patient Name MRN Nikki Hector [id number] Female 1960      Nursing Note by Bobo Sawyer RN at 2017  2:30 PM     Author:  Bobo Sawyer RN Service:  (none) Author Type:  NURS- Registered Nurse     Filed:  2017  2:26 PM Encounter Date:  2017 Status:  Signed     :  Bobo Sawyer RN (NURS- Registered Nurse)            Pt denies allergies to yeast gelatin neosporin eggs thimerasol or latex or past reactions to vaccinations. Copy of [facility] given.  BOBO SAWYER RN ....................  2017   2:18 PM

## 2018-01-04 NOTE — TELEPHONE ENCOUNTER
Patient Information     Patient Name MRN Nikki Hector [id number] Female 1960      Telephone Encounter by Nanette Tripathi RN at 2017  1:52 PM     Author:  Nanette Tripathi RN Service:  (none) Author Type:  NURS- Registered Nurse     Filed:  2017  1:54 PM Encounter Date:  2017 Status:  Signed     :  Nanette Tripathi RN (NURS- Registered Nurse)            Proton Pump Inhibitors    Office visit in the past 12 months or per provider note.    Last visit with JENNIFER BENNETT was on: 2016 in [facility]  Next visit with JENNIFER BENNETT is on: No future appointment listed with this provider  Next visit with Family Practice is on: No future appointment listed in this department    Max refill for 12 months from last office visit or per provider note.    Had Pre-op PX with Magi Mclean 16. Will refill X1 and send a letter to patient to make annual appt. Prescription refilled per RN Medication Refill Policy.................... NANETTE TRIPATHI RN ....................  2017   1:52 PM

## 2018-01-04 NOTE — TELEPHONE ENCOUNTER
Patient Information     Patient Name MRN Nikki Hector [id number] Female 1960      Telephone Encounter by Leidy Chavez at 2017  2:23 PM     Author:  Leidy Chavez Service:  (none) Author Type:  (none)     Filed:  2017  2:36 PM Encounter Date:  2017 Status:  Signed     :  Leidy Chavez            I called patient back to give her Dr. Owen's message. The patient states that she has a history of recurring UTI's and had an outstanding prescription that just  last week. She believes she does have a UTI right now. Patient said she was seeing Dr. Clarke for microscopic hematuria and that he was the one who suggested the continuing refills of the antibiotic. I suggested setting up an appointment with a provider to follow up since [facility] will be out. She said she would rather not come into town though. She will try calling Dr. Clarke to see if he can help.   Leidy MCCARTY, CMA 2017

## 2018-01-04 NOTE — TELEPHONE ENCOUNTER
Patient Information     Patient Name MRN Sex Nikki Long [id number] Female 1960      Telephone Encounter by Paul Owen MD at 2017  1:47 PM     Author:  Paul Owen MD Service:  (none) Author Type:  Physician     Filed:  2017  1:47 PM Encounter Date:  2017 Status:  Signed     :  Paul Owen MD (Physician)            [facility] will need to meet with new provider to determine if new provider will give prescription without ua

## 2018-01-04 NOTE — TELEPHONE ENCOUNTER
Patient Information     Patient Name MRN Nikki Hector [id number] Female 1960      Telephone Encounter by Catarina Stock RN at 2017  3:49 PM     Author:  Catarina Stock RN Service:  (none) Author Type:  NURS- Registered Nurse     Filed:  2017  3:51 PM Encounter Date:  2017 Status:  Signed     :  Catarina Stock RN (NURS- Registered Nurse)            Refill request already responded to.  Catarina Stock RN........2017 3:51 PM

## 2018-01-04 NOTE — TELEPHONE ENCOUNTER
"Patient Information     Patient Name MRN Nikki Hector [id number] Female 1960      Telephone Encounter by Catarina Stock RN at 2017  7:56 AM     Author:  Catarina Stock RN Service:  (none) Author Type:  NURS- Registered Nurse     Filed:  2017  8:27 AM Encounter Date:  2017 Status:  Signed     :  Catarina Stock RN (NURS- Registered Nurse)            In clinical absence of patient's primary, Jennifer Bennett MD, patient is requesting that this message be sent to the Doc of the Day for consideration please.      Patient states she feels like she is getting a UTI and was given Bactrim to keep on had if one should develop. She states she currently has a bottle, but no refills and would like to make sure she has some on hand for the next time she needs them. Please [facility] if appropriate.    Patient is aware that Jennifer Bennett MD is unavailable and that she needs to establish care with a new provider for all future needs.    This is a Refill request from: patient   Name of Medication: Bactrim  Quantity requested: 6  Last fill date: 2016  Due for refill: yes  Last visit with JENNIFER BENNETT was on: 2016 in [facility]  PCP:  Jennifer Bennett MD  Controlled Substance Agreement:  n/a   Diagnosis r/t this medication request: recurrent UTI     Unable to complete prescription refill per RN Medication Refill Policy.................... Catarina Stock RN ....................  2017   8:20 AM      Answer Assessment - Initial Assessment Questions  1. SYMPTOM: \"What's the main symptom you're concerned about?\" (e.g., frequency, incontinence)      Frequency and urgency  2. ONSET: \"When did the  ________  start?\"      yesterday  3. PAIN: \"Is there any pain?\" If so, ask: \"How bad is it?\" (Scale: 1-10; mild, moderate, severe)      no  4. CAUSE: \"What do you think is causing the symptoms?\"      UTI  5. OTHER SYMPTOMS: \"Do you have " "any other symptoms?\" (e.g., fever, flank pain, blood in urine, pain with urination)      no  6. PREGNANCY: \"Is there any chance you are pregnant?\" \"When was your last menstrual period?\"      postmenopausal    Protocols used: ADULT URINARY SYMPTOMS-A-AH            "

## 2018-01-04 NOTE — TELEPHONE ENCOUNTER
Patient Information     Patient Name MRN Nikki Hector [id number] Female 1960      Telephone Encounter by Leidy Chavez at 2017  2:58 PM     Author:  Leidy Chavez Service:  (none) Author Type:  (none)     Filed:  2017  3:00 PM Encounter Date:  2017 Status:  Signed     :  Leidy Chavez            Called patient to notify her of prescription. I spoke with her  who said he notified her because the pharmacy called him.   Leidy MCCARTY, [facility] 2017

## 2018-01-05 NOTE — TELEPHONE ENCOUNTER
Patient Information     Patient Name MRN Sex Nikki Long [id number] Female 1960      Telephone Encounter by Catarina Stock RN at 5/15/2017  9:57 AM     Author:  Catarina Stock RN Service:  (none) Author Type:  NURS- Registered Nurse     Filed:  5/15/2017 10:03 AM Encounter Date:  2017 Status:  Signed     :  Catarina Stock RN (NURS- Registered Nurse)            Statins    Office visit in the past 12 months.    Last visit with JENNIFER BENNETT was on: 2016 in [facility]  Next visit with JENNIFER BENNETT is on: No future appointment listed with this provider  Next visit with Family Practice is on: No future appointment listed in this department    Last Lipids:  Chol: 338    2016  T    2016  HDL:   70    2016  LDL:  245    2016  LDL DIRECT:  No results found in past 5 years    .    Max refills 12 months from last office visit.      Patient is due for medication management appointment. Limited refill provided at this time. [employer] message and/or letter sent recently for reminder to patient. Prescription refilled per RN Medication Refill Policy.................... Catarina Stock RN ....................  5/15/2017   10:02 AM

## 2018-01-25 VITALS
DIASTOLIC BLOOD PRESSURE: 76 MMHG | HEIGHT: 61 IN | RESPIRATION RATE: 18 BRPM | BODY MASS INDEX: 33.04 KG/M2 | TEMPERATURE: 97.6 F | WEIGHT: 175 LBS | HEART RATE: 72 BPM | SYSTOLIC BLOOD PRESSURE: 112 MMHG

## 2018-01-29 ASSESSMENT — ANXIETY QUESTIONNAIRES: GAD7 TOTAL SCORE: 8

## 2018-01-29 ASSESSMENT — PATIENT HEALTH QUESTIONNAIRE - PHQ9: SUM OF ALL RESPONSES TO PHQ QUESTIONS 1-9: 5

## 2018-02-02 ENCOUNTER — DOCUMENTATION ONLY (OUTPATIENT)
Dept: FAMILY MEDICINE | Facility: OTHER | Age: 58
End: 2018-02-02

## 2018-02-02 PROBLEM — Z00.00 HEALTH CARE MAINTENANCE: Status: ACTIVE | Noted: 2017-07-17

## 2018-02-02 RX ORDER — UBIDECARENONE 100 MG
100 CAPSULE ORAL DAILY
COMMUNITY
Start: 2013-10-30

## 2018-02-02 RX ORDER — ATORVASTATIN CALCIUM 20 MG/1
20 TABLET, FILM COATED ORAL DAILY
COMMUNITY
Start: 2017-07-12 | End: 2018-09-12

## 2018-02-02 RX ORDER — ALBUTEROL SULFATE 90 UG/1
2 AEROSOL, METERED RESPIRATORY (INHALATION) EVERY 4 HOURS PRN
COMMUNITY
Start: 2016-04-12 | End: 2018-09-12

## 2018-02-02 RX ORDER — MONTELUKAST SODIUM 10 MG/1
10 TABLET ORAL DAILY
COMMUNITY
Start: 2017-07-12 | End: 2018-07-15

## 2018-02-02 RX ORDER — FEXOFENADINE HCL 180 MG/1
180 TABLET ORAL DAILY
COMMUNITY
Start: 2010-08-16

## 2018-02-02 RX ORDER — DIPHENOXYLATE HYDROCHLORIDE AND ATROPINE SULFATE 2.5; .025 MG/1; MG/1
1 TABLET ORAL DAILY
COMMUNITY
End: 2021-06-02

## 2018-02-02 RX ORDER — INHALER, ASSIST DEVICES
SPACER (EA) MISCELLANEOUS
COMMUNITY
Start: 2015-02-10 | End: 2021-08-06

## 2018-02-02 RX ORDER — ALPRAZOLAM 0.5 MG
0.5 TABLET ORAL 2 TIMES DAILY PRN
COMMUNITY
Start: 2017-07-12 | End: 2018-09-12

## 2018-02-02 RX ORDER — ASPIRIN 81 MG/1
81 TABLET ORAL
COMMUNITY
End: 2020-06-22

## 2018-02-02 RX ORDER — METOPROLOL SUCCINATE 25 MG/1
25 TABLET, EXTENDED RELEASE ORAL DAILY
COMMUNITY
Start: 2017-12-11 | End: 2018-07-02

## 2018-02-09 ENCOUNTER — COMMUNICATION - GICH (OUTPATIENT)
Dept: INTERNAL MEDICINE | Facility: OTHER | Age: 58
End: 2018-02-09

## 2018-02-09 DIAGNOSIS — E78.5 HYPERLIPIDEMIA: ICD-10-CM

## 2018-02-09 DIAGNOSIS — Z79.899 OTHER LONG TERM (CURRENT) DRUG THERAPY: ICD-10-CM

## 2018-02-12 NOTE — TELEPHONE ENCOUNTER
Patient Information     Patient Name MRN Sex Nikki Long [id number] Female 1960      Telephone Encounter by Tahira Cat RN at 2017  3:40 PM     Author:  Tahira Cat RN Service:  (none) Author Type:  NURS- Registered Nurse     Filed:  2017  3:44 PM Encounter Date:  2017 Status:  Signed     :  Tahira Cat RN (NURS- Registered Nurse)            Beta Blockers     Office visit in the past 12 months or per provider note.    Last visit with ULISSES PIRES was on: 2017 in [facility]  Next visit with ULISSES PIRES is on: No future appointment listed with this provider  Next visit with Internal Medicine is on: No future appointment listed in this department    Max refill for 12 months from last office visit or per provider note.  Prescription refilled per RN Medication Refill Policy.................... Tahira Cat RN ....................  2017   3:43 PM

## 2018-02-13 NOTE — TELEPHONE ENCOUNTER
Patient Information     Patient Name MRN Nikki Hector [id number] Female 1960      Telephone Encounter by Meseret Joyner at 2018  8:35 AM     Author:  Meseret Joyner  Service:  (none) Author Type:  (none)     Filed:  2018  8:53 AM  Encounter Date:  2018 Status:  Addendum     :  Meseret Joyner        Related Notes: Original Note by Meseret Joyner filed at 2018  8:37 AM            PATIENT ARRIVED FOR APPOINTMENT AT 0830. SHE HAS MISSED APPT BUT WOULD LIKE [facility] TO PUT IN ORDER FOR CHOLESTEROL LABS AS SHE IS FASTING NOW AND WOULD LIKE TO DO THAT.  SHE IS WAITING IN THE LOBBY.  PLEASE LET US KNOW.    Meseret oJyner ....................  2018   8:37 AM    PATIENT IS NO LONGER WAITING.  SHE HAD TO LEAVE.  PLEASE CALL WHEN ORDER IS DONE.  Meseret Joyner ....................  2018   8:53 AM

## 2018-02-13 NOTE — TELEPHONE ENCOUNTER
Patient Information     Patient Name MRN Sex Nikki Long [id number] Female 1960      Telephone Encounter by Alana Butterfield LPN at 2018 11:15 AM     Author:  Alana Butterfield LPN Service:  (none) Author Type:  NURS- Licensed Practical Nurse     Filed:  2018 11:17 AM Encounter Date:  2018 Status:  Signed     :  Alana Butterfield LPN (NURS- Licensed Practical Nurse)            Verified patient's last name and date of birth. Notified of the orders below. She will call back to reschedule lab only and her appointment.  Alana Butterfield LPN.........2018   11:17 AM

## 2018-02-13 NOTE — TELEPHONE ENCOUNTER
Patient Information     Patient Name MRN Sex Nikki Long [id number] Female 1960      Telephone Encounter by Noris Medellin DO at 2018 11:09 AM     Author:  Noris Medellin DO Service:  (none) Author Type:  PHYS- Osteopathic     Filed:  2018 11:10 AM Encounter Date:  2018 Status:  Signed     :  Noris Medellin DO (PHYS- Osteopathic)            Orders placed.  I will watch for the results.  If she would like to wait to reschedule until her annual exam in July this would be reasonable unless she has other concerns.

## 2018-02-21 DIAGNOSIS — E78.5 HYPERLIPEMIA: ICD-10-CM

## 2018-02-21 DIAGNOSIS — Z79.899 HIGH RISK MEDICATION USE: ICD-10-CM

## 2018-02-21 LAB
ANION GAP SERPL CALCULATED.3IONS-SCNC: 8 MMOL/L (ref 3–14)
BUN SERPL-MCNC: 18 MG/DL (ref 7–25)
CALCIUM SERPL-MCNC: 9 MG/DL (ref 8.6–10.3)
CHLORIDE SERPL-SCNC: 104 MMOL/L (ref 98–107)
CHOLEST SERPL-MCNC: 194 MG/DL
CO2 SERPL-SCNC: 25 MMOL/L (ref 21–31)
CREAT SERPL-MCNC: 0.66 MG/DL (ref 0.6–1.2)
GFR SERPL CREATININE-BSD FRML MDRD: >90 ML/MIN/1.7M2
GLUCOSE SERPL-MCNC: 106 MG/DL (ref 70–105)
HDLC SERPL-MCNC: 65 MG/DL (ref 23–92)
LDLC SERPL CALC-MCNC: 108 MG/DL
NONHDLC SERPL-MCNC: 129 MG/DL
POTASSIUM SERPL-SCNC: 3.9 MMOL/L (ref 3.5–5.1)
SODIUM SERPL-SCNC: 137 MMOL/L (ref 134–144)
TRIGL SERPL-MCNC: 107 MG/DL

## 2018-02-21 PROCEDURE — 36415 COLL VENOUS BLD VENIPUNCTURE: CPT | Performed by: INTERNAL MEDICINE

## 2018-02-21 PROCEDURE — 80048 BASIC METABOLIC PNL TOTAL CA: CPT | Performed by: INTERNAL MEDICINE

## 2018-02-21 PROCEDURE — 80061 LIPID PANEL: CPT | Performed by: INTERNAL MEDICINE

## 2018-02-25 ENCOUNTER — HEALTH MAINTENANCE LETTER (OUTPATIENT)
Age: 58
End: 2018-02-25

## 2018-02-27 DIAGNOSIS — N39.0 RECURRENT UTI: Primary | ICD-10-CM

## 2018-03-06 RX ORDER — SULFAMETHOXAZOLE/TRIMETHOPRIM 800-160 MG
TABLET ORAL
Qty: 6 TABLET | Refills: 0 | Status: SHIPPED | OUTPATIENT
Start: 2018-03-06 | End: 2018-03-09

## 2018-03-06 NOTE — TELEPHONE ENCOUNTER
PLEASE REVIEW, SIGN AND SEND AS APPROPRIATE: THANK YOU.    PATIENT IS REQUESTING REFILL TO HAVE ON HAND- HOPING TO  THUR AS IS LEAVING FOR [place] EARLY FRI.    Called and spoke to Patient after verifying last name and date of birth. Patient states she normally sees Dr. Clarke for microscopic hematuria and he normally prescribes this medication for her to have as a back-up. Patient states it was filled last by Dr. Owen because Dr. Clarke was out of the clinic.    Patient is requesting refill for back up while she is on her trip to [place]. Patient will need to be able to pick Rx up Thursday as she is leaving early Friday AM.    Sulfamethoxazole-trimethroprim (BACTRIM DS/SEPTRA DS) 800-160 mg per tablet   Last Written Prescription Date:  4/26/2017 (D/C 7/12/17; reason: med complete)  Last Fill Quantity: 6 tablet,   # refills: 1  Last Office Visit: 7/12/17 (est. Care with Dr. Medellin), 5/26/2016 (Dr. Clarke)  Future Office visit:   None noted    Routing refill request to provider for review/approval because:  Drug not active on patient's medication list    Unable to complete prescription refill per RN Medication Refill Policy. Tahira Salmeron RN .............. 3/6/2018  8:40 AM

## 2018-03-26 ENCOUNTER — OFFICE VISIT (OUTPATIENT)
Dept: UROLOGY | Facility: OTHER | Age: 58
End: 2018-03-26
Attending: UROLOGY
Payer: COMMERCIAL

## 2018-03-26 VITALS — RESPIRATION RATE: 14 BRPM | HEIGHT: 62 IN | SYSTOLIC BLOOD PRESSURE: 120 MMHG | DIASTOLIC BLOOD PRESSURE: 78 MMHG

## 2018-03-26 DIAGNOSIS — R39.15 URINARY URGENCY: Primary | ICD-10-CM

## 2018-03-26 DIAGNOSIS — N39.0 FREQUENT UTI: ICD-10-CM

## 2018-03-26 LAB
ALBUMIN UR-MCNC: NEGATIVE MG/DL
APPEARANCE UR: CLEAR
BACTERIA #/AREA URNS HPF: ABNORMAL /HPF
BILIRUB UR QL STRIP: NEGATIVE
COLOR UR AUTO: YELLOW
GLUCOSE UR STRIP-MCNC: NEGATIVE MG/DL
HGB UR QL STRIP: ABNORMAL
KETONES UR STRIP-MCNC: NEGATIVE MG/DL
LEUKOCYTE ESTERASE UR QL STRIP: NEGATIVE
NITRATE UR QL: NEGATIVE
PH UR STRIP: 6 PH (ref 5–7)
RBC #/AREA URNS AUTO: ABNORMAL /HPF
SOURCE: ABNORMAL
SP GR UR STRIP: 1.02 (ref 1–1.03)
UROBILINOGEN UR STRIP-ACNC: 0.2 EU/DL (ref 0.2–1)
WBC #/AREA URNS AUTO: ABNORMAL /HPF

## 2018-03-26 PROCEDURE — 51798 US URINE CAPACITY MEASURE: CPT | Performed by: UROLOGY

## 2018-03-26 PROCEDURE — 81001 URINALYSIS AUTO W/SCOPE: CPT | Performed by: UROLOGY

## 2018-03-26 PROCEDURE — 99213 OFFICE O/P EST LOW 20 MIN: CPT | Mod: 25 | Performed by: UROLOGY

## 2018-03-26 RX ORDER — SULFAMETHOXAZOLE/TRIMETHOPRIM 800-160 MG
1 TABLET ORAL 2 TIMES DAILY
Qty: 6 TABLET | Refills: 2 | Status: SHIPPED | OUTPATIENT
Start: 2018-03-26 | End: 2019-06-24

## 2018-03-26 ASSESSMENT — PAIN SCALES - GENERAL: PAINLEVEL: MILD PAIN (3)

## 2018-03-26 NOTE — PROGRESS NOTES
Type of Visit  Established    Chief Complaint  Frequent UTIs    HPI  Ms. Lezama is a 58 year old female with frequent UTIs.  Her UTI symptoms are typically very clear to her: urgency, dysuria.  She travels frequently for vacation destinations.  She denies symptoms today.  She has had 2 UTIs since the new year.  She has successfully treated the UTIs with on demand Bactrim.  She has treated a UTI 4-5 times in the last year.      Review of Systems  I reviewed the ROS with the patient.    Nursing Notes:   Leidy Ceron LPN  3/26/2018  1:22 PM  Signed  Here for urinary urgency and recurrent UTI.    Post-Void Residual  A post-void residual was measured by ultrasonic bladder scanner.  0 mL  Review of Systems:    Weight loss:    No     Recent fever/chills:  No   Night sweats:   No  Current skin rash:  No   Recent hair loss:  No  Heat intolerance:  No   Cold intolerance:  No  Chest pain:   No   Palpitations:   No  Shortness of breath:  No   Wheezing:   No  Constipation:    No   Diarrhea:   No   Nausea:   No   Vomiting:   No   Kidney/side pain:  No   Back pain:   Yes  Frequent headaches:  Yes   Dizziness:     No  Leg swelling:   No   Calf pain:    No    Leidy Ceron LPN on 3/26/2018 at 1:14 PM      Family History  Family History   Problem Relation Age of Onset     Thyroid Disease Mother      Thyroid Disease     HEART DISEASE Mother      Heart Disease     CANCER Mother      Cancer,skin     DIABETES Mother      Diabetes     Hyperlipidemia Mother      Hyperlipidemia     Hypertension Mother      Hypertension     Other - See Comments Mother      Stroke     DIABETES Father      Diabetes     HEART DISEASE Father      Heart Disease     Hyperlipidemia Father      Hyperlipidemia     Hypertension Father      Hypertension     Other - See Comments Father 73     Stroke     Asthma Sister      Asthma     HEART DISEASE Brother      Heart Disease     Hyperlipidemia Brother      Hyperlipidemia     Hyperlipidemia Sister       "Hyperlipidemia     DIABETES Maternal Grandmother      Diabetes     Other - See Comments Maternal Grandmother      Stroke     DIABETES Paternal Grandmother      Diabetes     Asthma Sister      Asthma     Hypertension Sister      Hypertension     Other - See Comments Paternal Grandfather      Stroke     Anesthesia Reaction No family hx of      Anesthesia Problem     Blood Disease No family hx of      Blood Disease       Physical Exam  Vitals:    03/26/18 1314   BP: 120/78   BP Location: Right arm   Patient Position: Sitting   Cuff Size: Adult Large   Resp: 14   Height: 1.562 m (5' 1.5\")     Constitutional: NAD, WDWN.   Cardiovascular: Regular rate.  Pulmonary/Chest: Respirations are even and non-labored bilaterally.  Abdominal: Soft. No distension, tenderness, masses or guarding. No CVA tenderness.  Genitourinary: Nonpalpable bladder.  Extremities: JIMI x 4, Warm. No clubbing.  No cyanosis.    Skin: Pink, warm and dry.  No rashes noted.    Labs  Results for orders placed or performed in visit on 02/21/18   Lipid Profile (Chol, Trig, HDL, LDL calc)   Result Value Ref Range    Cholesterol 194 <200 mg/dL    Triglycerides 107 <150 mg/dL    HDL Cholesterol 65 23 - 92 mg/dL    LDL Cholesterol Calculated 108 (H) <100 mg/dL    Non HDL Cholesterol 129 <130 mg/dL   Basic metabolic panel  (Ca, Cl, CO2, Creat, Gluc, K, Na, BUN)   Result Value Ref Range    Sodium 137 134 - 144 mmol/L    Potassium 3.9 3.5 - 5.1 mmol/L    Chloride 104 98 - 107 mmol/L    Carbon Dioxide 25 21 - 31 mmol/L    Anion Gap 8 3 - 14 mmol/L    Glucose 106 (H) 70 - 105 mg/dL    Urea Nitrogen 18 7 - 25 mg/dL    Creatinine 0.66 0.60 - 1.20 mg/dL    GFR Estimate >90 >60 mL/min/1.7m2    GFR Estimate If Black >90 >60 mL/min/1.7m2    Calcium 9.0 8.6 - 10.3 mg/dL     Results for NOLA HUITRON (MRN [id number]) as of 3/26/2018 13:24   3/26/2018 13:03   Color Urine Yellow   Appearance Urine Clear   Glucose Urine Negative   Bilirubin Urine Negative   Ketones Urine " Negative   Specific Gravity Urine 1.025   pH Urine 6.0   Protein Albumin Urine Negative   Urobilinogen Urine 0.2   Nitrite Urine Negative   Blood Urine Small (A)   Leukocyte Esterase Urine Negative   Source PENDING     Post-Void Residual  A post-void residual was measured by ultrasonic bladder scanner.  0 mL    Assessment  Ms. Lezama is a 58 year old female with frequent UTIs.  She is doing well with on demand therapy.    Plan  Bactrim x 3 days on demand  Follow up in 1 year

## 2018-03-26 NOTE — NURSING NOTE
Here for urinary urgency and recurrent UTI.    Post-Void Residual  A post-void residual was measured by ultrasonic bladder scanner.  0 mL  Review of Systems:    Weight loss:    No     Recent fever/chills:  No   Night sweats:   No  Current skin rash:  No   Recent hair loss:  No  Heat intolerance:  No   Cold intolerance:  No  Chest pain:   No   Palpitations:   No  Shortness of breath:  No   Wheezing:   No  Constipation:    No   Diarrhea:   No   Nausea:   No   Vomiting:   No   Kidney/side pain:  No   Back pain:   Yes  Frequent headaches:  Yes   Dizziness:     No  Leg swelling:   No   Calf pain:    No    Leidy Ceron LPN on 3/26/2018 at 1:14 PM

## 2018-03-26 NOTE — MR AVS SNAPSHOT
"              After Visit Summary   3/26/2018    Nikki Lezama    MRN: [id number]           Patient Information     Date Of Birth          1960        Visit Information        Provider Department      3/26/2018 1:00 PM Jim Calrke MD [facility]        Today's Diagnoses     Urinary urgency    -  1    Frequent UTI           Follow-ups after your visit        Who to contact     If you have questions or need follow up information about today's clinic visit or your schedule please contact [facility] directly at [phone].  Normal or non-critical lab and imaging results will be communicated to you by [employer], letter or phone within 4 business days after the clinic has received the results. If you do not hear from us within 7 days, please contact the clinic through [employer] or phone. If you have a critical or abnormal lab result, we will notify you by phone as soon as possible.  Submit refill requests through [employer] or call your pharmacy and they will forward the refill request to us. Please allow 3 business days for your refill to be completed.          Additional Information About Your Visit        MyChart Information     [employer] gives you secure access to your electronic health record. If you see a primary care provider, you can also send messages to your care team and make appointments. If you have questions, please call your primary care clinic.  If you do not have a primary care provider, please call [phone] and they will assist you.        Care EveryWhere ID     This is your Care EveryWhere ID. This could be used by other organizations to access your [facility] medical records  [id number]        Your Vitals Were     Respirations Height                14 1.562 m (5' 1.5\")           Blood Pressure from Last 3 Encounters:   03/26/18 120/78   07/12/17 112/76   07/11/16 118/72    Weight from Last 3 Encounters:   07/12/17 79.4 kg (175 lb)   07/11/16 77.2 kg (170 " lb 4 oz)   05/25/16 77.5 kg (170 lb 12.8 oz)              We Performed the Following     POST-VOID RESIDUAL BLADDER SCAN     UA reflex to Microscopic     Urine Microscopic          Today's Medication Changes          These changes are accurate as of 3/26/18  1:33 PM.  If you have any questions, ask your nurse or doctor.               Start taking these medicines.        Dose/Directions    sulfamethoxazole-trimethoprim 800-160 MG per tablet   Commonly known as:  BACTRIM DS   Used for:  Frequent UTI   Started by:  Jim Clarke MD        Dose:  1 tablet   Take 1 tablet by mouth 2 times daily for 3 days At onset of UTI symptoms   Quantity:  6 tablet   Refills:  2            Where to get your medicines      These medications were sent to [facility] [facility] - [place], [place] - [address]  [address] [facility] [address]     Phone:  [phone]     sulfamethoxazole-trimethoprim 800-160 MG per tablet                Primary Care Provider Office Phone # Fax #    Noris CLARKE DO V[place] [phone]       [address] [facility] [address]        Equal Access to Services     [facility]: Hadii quentin ku hadasho Soomaali, waaxda luqadaha, qaybta kaalmada claude, ramos snow . So [facility] [phone].    ATENCIÓN: Si habla español, tiene a marmolejo disposición servicios gratuitos de asistencia lingüística. Kareem al [phone].    We comply with applicable federal civil rights laws and [place] laws. We do not discriminate on the basis of race, color, national origin, age, disability, sex, sexual orientation, or gender identity.            Thank you!     Thank you for choosing [facility] AND [facility]  for your care. Our goal is always to provide you with excellent care. Hearing back from our patients is one way we can continue to improve our services. Please take a few minutes to complete the written survey that you may receive in the mail after your  visit with us. Thank you!             Your Updated Medication List - Protect others around you: Learn how to safely use, store and throw away your medicines at www.disposemymeds.org.          This list is accurate as of 3/26/18  1:33 PM.  Always use your most recent med list.                   Brand Name Dispense Instructions for use Diagnosis    AEROCHAMBER MINI CHAMBER Radha      For home use.        albuterol 108 (90 BASE) MCG/ACT Inhaler    PROAIR HFA/PROVENTIL HFA/VENTOLIN HFA     Inhale 2 puffs into the lungs every 4 hours as needed for shortness of breath / dyspnea        ALPRAZolam 0.5 MG tablet    XANAX     Take 0.5 mg by mouth 2 times daily as needed for anxiety        aspirin EC 81 MG EC tablet      Take 81 mg by mouth daily (with breakfast)        atorvastatin 20 MG tablet    LIPITOR     Take 20 mg by mouth daily        fexofenadine 180 MG tablet    ALLEGRA     Take 180 mg by mouth daily        GLUCOSAMINE CHOND COMPLEX/MSM PO      Take 1 tablet by mouth daily        metoprolol succinate 25 MG 24 hr tablet    TOPROL-XL     Take 25 mg by mouth daily        montelukast 10 MG tablet    SINGULAIR     Take 10 mg by mouth daily        MULTI-VITAMINS Tabs      Take 1 tablet by mouth daily        omeprazole 20 MG CR capsule    priLOSEC     Take 20 mg by mouth every morning (before breakfast)        probiotic Caps      Take 1 capsule by mouth daily        RA KRILL  MG Caps      Take 1 tablet by mouth daily        SM COENZYME Q-10 100 MG Caps capsule   Generic drug:  co-enzyme Q-10      Take 100 mg by mouth daily        sulfamethoxazole-trimethoprim 800-160 MG per tablet    BACTRIM DS    6 tablet    Take 1 tablet by mouth 2 times daily for 3 days At onset of UTI symptoms    Frequent UTI

## 2018-05-17 ENCOUNTER — HOSPITAL ENCOUNTER (OUTPATIENT)
Dept: GENERAL RADIOLOGY | Facility: OTHER | Age: 58
Discharge: HOME | End: 2018-05-17
Attending: INTERNAL MEDICINE | Admitting: INTERNAL MEDICINE
Payer: COMMERCIAL

## 2018-05-17 ENCOUNTER — OFFICE VISIT (OUTPATIENT)
Dept: INTERNAL MEDICINE | Facility: OTHER | Age: 58
End: 2018-05-17
Attending: INTERNAL MEDICINE
Payer: COMMERCIAL

## 2018-05-17 VITALS
HEART RATE: 84 BPM | DIASTOLIC BLOOD PRESSURE: 76 MMHG | BODY MASS INDEX: 32.3 KG/M2 | RESPIRATION RATE: 20 BRPM | HEIGHT: 62 IN | TEMPERATURE: 99.9 F | WEIGHT: 175.5 LBS | SYSTOLIC BLOOD PRESSURE: 136 MMHG

## 2018-05-17 DIAGNOSIS — J45.31 MILD PERSISTENT ASTHMA WITH ACUTE EXACERBATION: ICD-10-CM

## 2018-05-17 DIAGNOSIS — R05.9 COUGH: ICD-10-CM

## 2018-05-17 DIAGNOSIS — R05.9 COUGH: Primary | ICD-10-CM

## 2018-05-17 PROCEDURE — 71046 X-RAY EXAM CHEST 2 VIEWS: CPT

## 2018-05-17 PROCEDURE — 99214 OFFICE O/P EST MOD 30 MIN: CPT | Performed by: INTERNAL MEDICINE

## 2018-05-17 RX ORDER — PREDNISONE 20 MG/1
20 TABLET ORAL DAILY
Qty: 5 TABLET | Refills: 0 | Status: SHIPPED | OUTPATIENT
Start: 2018-05-17 | End: 2018-09-12

## 2018-05-17 RX ORDER — AZITHROMYCIN 250 MG/1
TABLET, FILM COATED ORAL
Qty: 6 TABLET | Refills: 0 | Status: SHIPPED | OUTPATIENT
Start: 2018-05-17 | End: 2018-09-12

## 2018-05-17 ASSESSMENT — PATIENT HEALTH QUESTIONNAIRE - PHQ9: 5. POOR APPETITE OR OVEREATING: SEVERAL DAYS

## 2018-05-17 ASSESSMENT — ANXIETY QUESTIONNAIRES
2. NOT BEING ABLE TO STOP OR CONTROL WORRYING: SEVERAL DAYS
3. WORRYING TOO MUCH ABOUT DIFFERENT THINGS: MORE THAN HALF THE DAYS
5. BEING SO RESTLESS THAT IT IS HARD TO SIT STILL: MORE THAN HALF THE DAYS
6. BECOMING EASILY ANNOYED OR IRRITABLE: SEVERAL DAYS
IF YOU CHECKED OFF ANY PROBLEMS ON THIS QUESTIONNAIRE, HOW DIFFICULT HAVE THESE PROBLEMS MADE IT FOR YOU TO DO YOUR WORK, TAKE CARE OF THINGS AT HOME, OR GET ALONG WITH OTHER PEOPLE: SOMEWHAT DIFFICULT
1. FEELING NERVOUS, ANXIOUS, OR ON EDGE: SEVERAL DAYS
7. FEELING AFRAID AS IF SOMETHING AWFUL MIGHT HAPPEN: NOT AT ALL
GAD7 TOTAL SCORE: 8

## 2018-05-17 ASSESSMENT — PAIN SCALES - GENERAL: PAINLEVEL: MODERATE PAIN (5)

## 2018-05-17 NOTE — NURSING NOTE
Patient presents to clinic experiencing shortness of breath, chest congestion, cough, sinus drainage and body aches for past 5 days.    Tahira Rush LPN............5/17/2018 1:01 PM

## 2018-05-17 NOTE — PATIENT INSTRUCTIONS
Take your antibiotics as prescribed. Increase water intake.    Recommend eating yogurt/kefir or taking probiotics 1-2 times daily while on antibiotics     Call if symptoms do not improve in a couple days or if you develop any medication reactions.      For symptomatic relief you may try:    Nasal congestion  - pseudoephedrine 60mg every 4 hours as needed  - afrin nasal spray for no more than 3 consecutive days  -loratadine 10mg once daily as needed     Cough  - Guaifenesin DM (generic Robitussin DM) as needed     Sore throat  - cepacol or other throat lozenges as needed  - gargle salt water (1/2 teaspoon salt in8oz warm water) every 1-2 hours    Headache, body aches, pain, sinus pressure or fever  - acetaminophen 1000mg every 6 hours as needed (max of 8- 500mg pills daily)  - ibuprofen 600mg every 4 hours as needed (max of 16 - 200mg pills daily)  - may use oral decongestant If you choose pseudoephedrine, use for only 5-7 days AS DIRECTED. Speak to your pharmacist ifyou have any concerns about your medications.     General  - get extra rest  - drink plenty of fluid  - avoid tobacco products    You will need to be evaluated if you start to experience:   Fever higher than 102.5 F (39.2 C)   Worsening of current symptoms  Sudden and severe pain in the face and head or troubleseeing or thinking clearly   Swelling or redness around 1 or both eyes   Trouble breathing, chest pain or a stiff neck    * If you are a smoker, try to quit *

## 2018-05-17 NOTE — MR AVS SNAPSHOT
After Visit Summary   5/17/2018    Nikki Lezama    MRN: [id number]           Patient Information     Date Of Birth          1960        Visit Information        Provider Department      5/17/2018 1:00 PM Noris Medellin DO [facility]        Today's Diagnoses     Cough    -  1    Mild persistent asthma with acute exacerbation          Care Instructions    Take your antibiotics as prescribed. Increase water intake.    Recommend eating yogurt/kefir or taking probiotics 1-2 times daily while on antibiotics     Call if symptoms do not improve in a couple days or if you develop any medication reactions.      For symptomatic relief you may try:    Nasal congestion  - pseudoephedrine 60mg every 4 hours as needed  - afrin nasal spray for no more than 3 consecutive days  -loratadine 10mg once daily as needed     Cough  - Guaifenesin DM (generic Robitussin DM) as needed     Sore throat  - cepacol or other throat lozenges as needed  - gargle salt water (1/2 teaspoon salt in8oz warm water) every 1-2 hours    Headache, body aches, pain, sinus pressure or fever  - acetaminophen 1000mg every 6 hours as needed (max of 8- 500mg pills daily)  - ibuprofen 600mg every 4 hours as needed (max of 16 - 200mg pills daily)  - may use oral decongestant If you choose pseudoephedrine, use for only 5-7 days AS DIRECTED. Speak to your pharmacist ifyou have any concerns about your medications.     General  - get extra rest  - drink plenty of fluid  - avoid tobacco products    You will need to be evaluated if you start to experience:   Fever higher than 102.5 F (39.2 C)   Worsening of current symptoms  Sudden and severe pain in the face and head or troubleseeing or thinking clearly   Swelling or redness around 1 or both eyes   Trouble breathing, chest pain or a stiff neck    * If you are a smoker, try to quit *          Follow-ups after your visit        Follow-up notes from your care team     Return if  "symptoms worsen or fail to improve.      Future tests that were ordered for you today     Open Future Orders        Priority Expected Expires Ordered    XR Chest 2 Views Routine 5/17/2018 5/17/2019 5/17/2018            Who to contact     If you have questions or need follow up information about today's clinic visit or your schedule please contact [facility] AND [facility] directly at [phone].  Normal or non-critical lab and imaging results will be communicated to you by [employer], letter or phone within 4 business days after the clinic has received the results. If you do not hear from us within 7 days, please contact the clinic through "[employer]t or phone. If you have a critical or abnormal lab result, we will notify you by phone as soon as possible.  Submit refill requests through [employer] or call your pharmacy and they will forward the refill request to us. Please allow 3 business days for your refill to be completed.          Additional Information About Your Visit        [employer] Information     [employer] gives you secure access to your electronic health record. If you see a primary care provider, you can also send messages to your care team and make appointments. If you have questions, please call your primary care clinic.  If you do not have a primary care provider, please call [phone] and they will assist you.        Care EveryWhere ID     This is your Care EveryWhere ID. This could be used by other organizations to access your [facility] medical records  [id number]        Your Vitals Were     Pulse Temperature Respirations Height Breastfeeding?       84 99.9  F (37.7  C) (Tympanic) 20 5' 1.5\" (1.562 m) No     BMI (Body Mass Index)                   32.62 kg/m2            Blood Pressure from Last 3 Encounters:   05/17/18 136/76   03/26/18 120/78   07/12/17 112/76    Weight from Last 3 Encounters:   05/17/18 175 lb 8 oz (79.6 kg)   07/12/17 175 lb (79.4 kg)   07/11/16 170 lb 4 oz (77.2 kg)            "      Today's Medication Changes          These changes are accurate as of 5/17/18  2:01 PM.  If you have any questions, ask your nurse or doctor.               Start taking these medicines.        Dose/Directions    azithromycin 250 MG tablet   Commonly known as:  ZITHROMAX   Used for:  Mild persistent asthma with acute exacerbation   Started by:  Noris Medellin DO        Two tablets first day, then one tablet daily for four days.   Quantity:  6 tablet   Refills:  0       predniSONE 20 MG tablet   Commonly known as:  DELTASONE   Used for:  Mild persistent asthma with acute exacerbation   Started by:  Noris Medellin DO        Dose:  20 mg   Take 1 tablet (20 mg) by mouth daily   Quantity:  5 tablet   Refills:  0            Where to get your medicines      These medications were sent to [facility] [place] - [address]  [address] [facility] [address]     Phone:  [phone]     azithromycin 250 MG tablet    predniSONE 20 MG tablet                Primary Care Provider Office Phone # Fax #    Noris Medellin [phone]       [address] [facility] [address]        Equal Access to Services     [facility]: Hadii quentin siu hadasho Soomaali, waaxda luqadaha, qaybta kaalmada adeelgin, ramos snow . So [facility] [phone].    ATENCIÓN: Si habla español, tiene a marmolejo disposición servicios gratuitos de asistencia lingüística. Kareem al [phone].    We comply with applicable federal civil rights laws and [place] laws. We do not discriminate on the basis of race, color, national origin, age, disability, sex, sexual orientation, or gender identity.            Thank you!     Thank you for choosing [facility] [place] AND \[facility]\""  for your care. Our goal is always to provide you with excellent care. Hearing back from our patients is one way we can continue to improve our services. Please take a few minutes to complete  the written survey that you may receive in the mail after your visit with us. Thank you!             Your Updated Medication List - Protect others around you: Learn how to safely use, store and throw away your medicines at www.disposemymeds.org.          This list is accurate as of 5/17/18  2:01 PM.  Always use your most recent med list.                   Brand Name Dispense Instructions for use Diagnosis    AEROCHAMBER MINI CHAMBER Radha      For home use.        albuterol 108 (90 Base) MCG/ACT Inhaler    PROAIR HFA/PROVENTIL HFA/VENTOLIN HFA     Inhale 2 puffs into the lungs every 4 hours as needed for shortness of breath / dyspnea        ALPRAZolam 0.5 MG tablet    XANAX     Take 0.5 mg by mouth 2 times daily as needed for anxiety        aspirin 81 MG EC tablet      Take 81 mg by mouth daily (with breakfast)        atorvastatin 20 MG tablet    LIPITOR     Take 20 mg by mouth daily        azithromycin 250 MG tablet    ZITHROMAX    6 tablet    Two tablets first day, then one tablet daily for four days.    Mild persistent asthma with acute exacerbation       fexofenadine 180 MG tablet    ALLEGRA     Take 180 mg by mouth daily        GLUCOSAMINE CHOND COMPLEX/MSM PO      Take 1 tablet by mouth daily        metoprolol succinate 25 MG 24 hr tablet    TOPROL-XL     Take 25 mg by mouth daily        montelukast 10 MG tablet    SINGULAIR     Take 10 mg by mouth daily        MULTI-VITAMINS Tabs      Take 1 tablet by mouth daily        omeprazole 20 MG CR capsule    priLOSEC     Take 20 mg by mouth every morning (before breakfast)        predniSONE 20 MG tablet    DELTASONE    5 tablet    Take 1 tablet (20 mg) by mouth daily    Mild persistent asthma with acute exacerbation       probiotic Caps      Take 1 capsule by mouth daily        RA KRILL  MG Caps      Take 1 tablet by mouth daily        SM COENZYME Q-10 100 MG Caps capsule   Generic drug:  co-enzyme Q-10      Take 100 mg by mouth daily

## 2018-05-17 NOTE — PROGRESS NOTES
Chief Complaint   Patient presents with     Cough     chest congestion, shortness of breath, sinus drainage, body aches x 5 days          Subjective:   Ms. Lezama is a 58 year old female  seen for the acute concern today of chest congestion, shortness of breath and sinus drainage.  She reports that this started approximately 6 days ago.  She feels it started in the chest with breathing problems and cough.  She does have a history of asthma and has had issues since a history of histoplasmosis.  She has been running some fevers and today is 99.9 after taking Excedrin.  She has been using some Mucinex although this is only helped minimally.  She does feel that her symptoms have worsened over the last several days.  She denies any sick contacts.  She denies any travel.  She does have a wedding coming up in 3 weeks at which she needs to sing.  She did develop some sinus pressure recently.    She  reports that she has never smoked. She has never used smokeless tobacco.    Past medical history reviewed as below:     Past Medical History:   Diagnosis Date     Allergic rhinitis     1/12/2007     Amblyopia of eye     left eye; patching and vision therapy.     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      ROS:   Pertinent  ROS was performed and was negative, including for fevers, chills, chest pain, shortness of breath, increased lower extremity edema, changes in bowel or bladder, blood in the  "stool, difficulty swallowing, sores in the mouth. No other concerns, with exception of HPI above.      Objective:    /76 (BP Location: Right arm, Patient Position: Sitting, Cuff Size: Adult Large)  Pulse 84  Temp 99.9  F (37.7  C) (Tympanic)  Resp 20  Ht 5' 1.5\" (1.562 m)  Wt 175 lb 8 oz (79.6 kg)  LMP   Breastfeeding? No  BMI 32.62 kg/m2  GEN: Vitals reviewed.  Patient is in obvious discomfort.  No acute respiratory distress.  Cooperative with exam.  HEENT: Normocephalic atraumatic.  Pupils equally round.  No scleral icterus, no conjunctival erythema. Oropharynx with no erythema or exudates. Dentition adequate.  NECK: Supple; no thyromegaly.  No neck, cervical LAD.  Submandibular LAD bilaterally.  Erythema of the skin is noted consistent with a rash she typically gets with fevers  CV: Heart regular in rate and rhythm with no murmur.   LUNGS: Lungs with expiratory rhonchi noted in the bilateral bases.  Chest rise equal bilaterally.  No accessory muscle use.  SKIN: Warm and dry to touch.  No rash on face, arms and legs.  EXT: No clubbing or cyanosis.  No peripheral edema.      Assessment/Plan:   1. Cough  -X-ray obtained and reviewed personally.  It does not show any indication of pneumonia.  We will treat as asthma exacerbation.  - XR Chest 2 Views; Future    2. Mild persistent asthma with acute exacerbation  -Likely exacerbation at this time  - patient is on multiple inhalers and nebs, continue for now  -She is provided with prednisone and antibiotics.  She is to call if she has any worsening breathing.  - predniSONE (DELTASONE) 20 MG tablet; Take 1 tablet (20 mg) by mouth daily  Dispense: 5 tablet; Refill: 0  - azithromycin (ZITHROMAX) 250 MG tablet; Two tablets first day, then one tablet daily for four days.  Dispense: 6 tablet; Refill: 0    She is to follow-up as needed if her symptoms do not improve or worsen at any point.         ULISSES PIRES DO   5/17/2018 1:23 PM    This document was prepared " using voice generated softwear. While every attempt was made for accuracy, grammatical errors may exist.

## 2018-05-18 ASSESSMENT — PATIENT HEALTH QUESTIONNAIRE - PHQ9: SUM OF ALL RESPONSES TO PHQ QUESTIONS 1-9: 0

## 2018-05-18 ASSESSMENT — ANXIETY QUESTIONNAIRES: GAD7 TOTAL SCORE: 8

## 2018-05-18 ASSESSMENT — ASTHMA QUESTIONNAIRES: ACT_TOTALSCORE: 22

## 2018-07-02 DIAGNOSIS — Z86.79 HISTORY OF SUPRAVENTRICULAR TACHYCARDIA: Primary | ICD-10-CM

## 2018-07-03 RX ORDER — METOPROLOL SUCCINATE 25 MG/1
TABLET, EXTENDED RELEASE ORAL
Qty: 90 TABLET | Refills: 1 | Status: SHIPPED | OUTPATIENT
Start: 2018-07-03 | End: 2018-09-12

## 2018-07-03 NOTE — TELEPHONE ENCOUNTER
Prescription approved per [facility] Refill Protocol.  LOV: 5/17/18  Per Care Everywhere  metoprolol succinate (TOPROL XL) 25 mg Sustained-Release tablet    Indications: History of supraventricular tachycardia  Nahomy Gonzalez RN on 7/3/2018 at 11:55 AM

## 2018-07-15 DIAGNOSIS — J45.30 MILD PERSISTENT ASTHMA WITHOUT COMPLICATION: Primary | ICD-10-CM

## 2018-07-17 RX ORDER — MONTELUKAST SODIUM 10 MG/1
TABLET ORAL
Qty: 90 TABLET | Refills: 2 | Status: SHIPPED | OUTPATIENT
Start: 2018-07-17 | End: 2019-03-12

## 2018-07-17 NOTE — TELEPHONE ENCOUNTER
Prescription approved per [facility] Refill Protocol.  LOV: 5/17/18  Nahomy Gonzalez RN on 7/17/2018 at 3:59 PM

## 2018-07-23 NOTE — PROGRESS NOTES
Patient Information     Patient Name  Nikki Lezama MRN  [id number] Sex  Female   1960      Letter by Kalie Peter MD at      Author:  Kalie Peter MD Service:  (none) Author Type:  (none)    Filed:   Encounter Date:  2017 Status:  (Other)           Nikki Lezama  [id number] [facility] [address]          May 2, 2017    Dear Ms. Lezama:    A refill of    Orders Placed This Encounter      omeprazole (PRILOSEC) 20 mg Delayed-Release capsule has been called into your pharmacy.    Additional refills require an office visit with Kalie Peter MD for annual medication management.   Please call the clinic at [phone] to schedule your appointment.    If you should require additional refills before your scheduled appointment, please contact your pharmacy and we will refill your medication until that date.      Thank you,    The Refill Nurse  [facility]

## 2018-08-16 DIAGNOSIS — M79.642 PAIN OF LEFT HAND: Primary | ICD-10-CM

## 2018-08-17 ENCOUNTER — OFFICE VISIT (OUTPATIENT)
Dept: ORTHOPEDICS | Facility: OTHER | Age: 58
End: 2018-08-17
Attending: SPECIALIST
Payer: COMMERCIAL

## 2018-08-17 ENCOUNTER — HOSPITAL ENCOUNTER (OUTPATIENT)
Dept: GENERAL RADIOLOGY | Facility: OTHER | Age: 58
Discharge: HOME | End: 2018-08-17
Attending: SPECIALIST | Admitting: SPECIALIST
Payer: COMMERCIAL

## 2018-08-17 DIAGNOSIS — Z00.00 ROUTINE GENERAL MEDICAL EXAMINATION AT A HEALTH CARE FACILITY: Primary | ICD-10-CM

## 2018-08-17 DIAGNOSIS — M79.642 PAIN OF LEFT HAND: ICD-10-CM

## 2018-08-17 PROCEDURE — 73130 X-RAY EXAM OF HAND: CPT | Mod: LT

## 2018-08-17 PROCEDURE — G0463 HOSPITAL OUTPT CLINIC VISIT: HCPCS | Mod: 25

## 2018-08-17 NOTE — MR AVS SNAPSHOT
After Visit Summary   8/17/2018    Nikki Lezama    MRN: [id number]           Patient Information     Date Of Birth          1960        Visit Information        Provider Department      8/17/2018 9:15 AM Ganga Baugh MD [facility]        Today's Diagnoses     Routine general medical examination at a health care facility    -  1       Follow-ups after your visit        Who to contact     If you have questions or need follow up information about today's clinic visit or your schedule please contact [facility] directly at [phone].  Normal or non-critical lab and imaging results will be communicated to you by [facility], letter or phone within 4 business days after the clinic has received the results. If you do not hear from us within 7 days, please contact the clinic through Any.DOt or phone. If you have a critical or abnormal lab result, we will notify you by phone as soon as possible.  Submit refill requests through [employer] or call your pharmacy and they will forward the refill request to us. Please allow 3 business days for your refill to be completed.          Additional Information About Your Visit        MyChart Information     [employer] gives you secure access to your electronic health record. If you see a primary care provider, you can also send messages to your care team and make appointments. If you have questions, please call your primary care clinic.  If you do not have a primary care provider, please call [phone] and they will assist you.        Care EveryWhere ID     This is your Care EveryWhere ID. This could be used by other organizations to access your [place] medical records  [id number]         Blood Pressure from Last 3 Encounters:   05/17/18 136/76   03/26/18 120/78   07/12/17 112/76    Weight from Last 3 Encounters:   05/17/18 79.6 kg (175 lb 8 oz)   07/12/17 79.4 kg (175 lb)   07/11/16 77.2 kg (170 lb 4 oz)               Today, you had the following     No orders found for display       Primary Care Provider Office Phone # Fax #    Noris Medellin [phone] [address]   [facility] [address]        Equal Access to Services     GEMMA ASTUDILLO : Hadii quentin ku hadmaryo Soomaali, waaxda luqadaha, qaybta kaalmada adeegyada, ramos marlenin hayaajose samsonwendydanielle cobb. So [facility] [phone].    ATENCIÓN: Si habla español, tiene a marmolejo disposición servicios gratuitos de asistencia lingüística. Llame al [phone].    We comply with applicable federal civil rights laws and [place] laws. We do not discriminate on the basis of race, color, national origin, age, disability, sex, sexual orientation, or gender identity.            Thank you!     Thank you for choosing [facility] AND [facility]  for your care. Our goal is always to provide you with excellent care. Hearing back from our patients is one way we can continue to improve our services. Please take a few minutes to complete the written survey that you may receive in the mail after your visit with us. Thank you!             Your Updated Medication List - Protect others around you: Learn how to safely use, store and throw away your medicines at www.disposemymeds.org.          This list is accurate as of 8/17/18 11:59 PM.  Always use your most recent med list.                   Brand Name Dispense Instructions for use Diagnosis    AEROCHAMBER MINI CHAMBER Radha      For home use.        albuterol 108 (90 Base) MCG/ACT inhaler    PROAIR HFA/PROVENTIL HFA/VENTOLIN HFA     Inhale 2 puffs into the lungs every 4 hours as needed for shortness of breath / dyspnea        ALPRAZolam 0.5 MG tablet    XANAX     Take 0.5 mg by mouth 2 times daily as needed for anxiety        aspirin 81 MG EC tablet      Take 81 mg by mouth daily (with breakfast)        atorvastatin 20 MG tablet    LIPITOR     Take 20 mg by mouth daily        azithromycin 250 MG tablet    ZITHROMAX     6 tablet    Two tablets first day, then one tablet daily for four days.    Mild persistent asthma with acute exacerbation       fexofenadine 180 MG tablet    ALLEGRA     Take 180 mg by mouth daily        GLUCOSAMINE CHOND COMPLEX/MSM PO      Take 1 tablet by mouth daily        metoprolol succinate 25 MG 24 hr tablet    TOPROL-XL    90 tablet    TAKE 1 TABLET BY MOUTH ONCE DAILY.    History of supraventricular tachycardia       montelukast 10 MG tablet    SINGULAIR    90 tablet    TAKE 1 TABLET BY MOUTH ONCE DAILY.    Mild persistent asthma without complication       MULTI-VITAMINS Tabs      Take 1 tablet by mouth daily        omeprazole 20 MG CR capsule    priLOSEC     Take 20 mg by mouth every morning (before breakfast)        predniSONE 20 MG tablet    DELTASONE    5 tablet    Take 1 tablet (20 mg) by mouth daily    Mild persistent asthma with acute exacerbation       probiotic Caps      Take 1 capsule by mouth daily        RA KRILL  MG Caps      Take 1 tablet by mouth daily        SM COENZYME Q-10 100 MG Caps capsule   Generic drug:  co-enzyme Q-10      Take 100 mg by mouth daily

## 2018-08-17 NOTE — NURSING NOTE
Patient is here for a consult on her left wrist/hand cyst.  Patient is seeing Dr. Ganga Baugh MD  from [facility] today, .    Radha Inman LPN .......8/17/2018 9:10 AM

## 2018-09-12 ENCOUNTER — OFFICE VISIT (OUTPATIENT)
Dept: FAMILY MEDICINE | Facility: OTHER | Age: 58
End: 2018-09-12
Attending: PHYSICIAN ASSISTANT
Payer: COMMERCIAL

## 2018-09-12 VITALS
SYSTOLIC BLOOD PRESSURE: 120 MMHG | BODY MASS INDEX: 31.65 KG/M2 | WEIGHT: 172 LBS | HEART RATE: 72 BPM | HEIGHT: 62 IN | DIASTOLIC BLOOD PRESSURE: 86 MMHG

## 2018-09-12 DIAGNOSIS — Z13.820 SCREENING FOR OSTEOPOROSIS: ICD-10-CM

## 2018-09-12 DIAGNOSIS — Z23 NEED FOR HEPATITIS A IMMUNIZATION: ICD-10-CM

## 2018-09-12 DIAGNOSIS — Z23 NEED FOR SHINGLES VACCINE: ICD-10-CM

## 2018-09-12 DIAGNOSIS — R10.11 RUQ ABDOMINAL PAIN: ICD-10-CM

## 2018-09-12 DIAGNOSIS — E78.2 MIXED HYPERLIPIDEMIA: ICD-10-CM

## 2018-09-12 DIAGNOSIS — F40.243 FEAR OF FLYING: ICD-10-CM

## 2018-09-12 DIAGNOSIS — Z23 NEED FOR TDAP VACCINATION: ICD-10-CM

## 2018-09-12 DIAGNOSIS — Z12.31 ENCOUNTER FOR SCREENING MAMMOGRAM FOR BREAST CANCER: ICD-10-CM

## 2018-09-12 DIAGNOSIS — M79.10 GENERALIZED MUSCLE ACHE: ICD-10-CM

## 2018-09-12 DIAGNOSIS — J30.89 PERENNIAL ALLERGIC RHINITIS: ICD-10-CM

## 2018-09-12 DIAGNOSIS — K21.9 GASTROESOPHAGEAL REFLUX DISEASE WITHOUT ESOPHAGITIS: ICD-10-CM

## 2018-09-12 DIAGNOSIS — M79.645 PAIN IN FINGER OF BOTH HANDS: ICD-10-CM

## 2018-09-12 DIAGNOSIS — M79.644 PAIN IN FINGER OF BOTH HANDS: ICD-10-CM

## 2018-09-12 DIAGNOSIS — Z00.00 ROUTINE HISTORY AND PHYSICAL EXAMINATION OF ADULT: Primary | ICD-10-CM

## 2018-09-12 DIAGNOSIS — Z13.1 SCREENING FOR DIABETES MELLITUS: ICD-10-CM

## 2018-09-12 DIAGNOSIS — Z86.79 HISTORY OF SUPRAVENTRICULAR TACHYCARDIA: ICD-10-CM

## 2018-09-12 LAB
ALBUMIN SERPL-MCNC: 4.6 G/DL (ref 3.5–5.7)
ALP SERPL-CCNC: 67 U/L (ref 34–104)
ALT SERPL W P-5'-P-CCNC: 31 U/L (ref 7–52)
AMYLASE SERPL-CCNC: 18 U/L (ref 29–103)
ANION GAP SERPL CALCULATED.3IONS-SCNC: 7 MMOL/L (ref 3–14)
AST SERPL W P-5'-P-CCNC: 23 U/L (ref 13–39)
BASOPHILS # BLD AUTO: 0.1 10E9/L (ref 0–0.2)
BASOPHILS NFR BLD AUTO: 1 %
BILIRUB SERPL-MCNC: 0.4 MG/DL (ref 0.3–1)
BUN SERPL-MCNC: 17 MG/DL (ref 7–25)
CALCIUM SERPL-MCNC: 9.8 MG/DL (ref 8.6–10.3)
CHLORIDE SERPL-SCNC: 105 MMOL/L (ref 98–107)
CO2 SERPL-SCNC: 30 MMOL/L (ref 21–31)
CREAT SERPL-MCNC: 0.63 MG/DL (ref 0.6–1.2)
CRP SERPL-MCNC: 0.2 MG/L
DIFFERENTIAL METHOD BLD: NORMAL
EOSINOPHIL # BLD AUTO: 0.2 10E9/L (ref 0–0.7)
EOSINOPHIL NFR BLD AUTO: 2.6 %
ERYTHROCYTE [DISTWIDTH] IN BLOOD BY AUTOMATED COUNT: 13.6 % (ref 10–15)
ERYTHROCYTE [SEDIMENTATION RATE] IN BLOOD BY WESTERGREN METHOD: 11 MM/H (ref 1–15)
GFR SERPL CREATININE-BSD FRML MDRD: >90 ML/MIN/1.7M2
GLUCOSE SERPL-MCNC: 106 MG/DL (ref 70–105)
HBA1C MFR BLD: 6.1 % (ref 4–6)
HCT VFR BLD AUTO: 42.7 % (ref 35–47)
HGB BLD-MCNC: 14.1 G/DL (ref 11.7–15.7)
IMM GRANULOCYTES # BLD: 0 10E9/L (ref 0–0.4)
IMM GRANULOCYTES NFR BLD: 0.2 %
LIPASE SERPL-CCNC: 29 U/L (ref 11–82)
LYMPHOCYTES # BLD AUTO: 2.3 10E9/L (ref 0.8–5.3)
LYMPHOCYTES NFR BLD AUTO: 37.6 %
MCH RBC QN AUTO: 30.2 PG (ref 26.5–33)
MCHC RBC AUTO-ENTMCNC: 33 G/DL (ref 31.5–36.5)
MCV RBC AUTO: 91 FL (ref 78–100)
MONOCYTES # BLD AUTO: 0.5 10E9/L (ref 0–1.3)
MONOCYTES NFR BLD AUTO: 8.4 %
NEUTROPHILS # BLD AUTO: 3 10E9/L (ref 1.6–8.3)
NEUTROPHILS NFR BLD AUTO: 50.2 %
PLATELET # BLD AUTO: 233 10E9/L (ref 150–450)
POTASSIUM SERPL-SCNC: 4.1 MMOL/L (ref 3.5–5.1)
PROT SERPL-MCNC: 7.3 G/DL (ref 6.4–8.9)
RBC # BLD AUTO: 4.67 10E12/L (ref 3.8–5.2)
RHEUMATOID FACT SER NEPH-ACNC: <14 IU/ML (ref 0–20)
SODIUM SERPL-SCNC: 142 MMOL/L (ref 134–144)
TSH SERPL DL<=0.05 MIU/L-ACNC: 1.55 IU/ML (ref 0.34–5.6)
URATE SERPL-MCNC: 3.9 MG/DL (ref 4.4–7.6)
WBC # BLD AUTO: 6.1 10E9/L (ref 4–11)

## 2018-09-12 PROCEDURE — 83036 HEMOGLOBIN GLYCOSYLATED A1C: CPT | Performed by: PHYSICIAN ASSISTANT

## 2018-09-12 PROCEDURE — 84550 ASSAY OF BLOOD/URIC ACID: CPT | Performed by: PHYSICIAN ASSISTANT

## 2018-09-12 PROCEDURE — 90471 IMMUNIZATION ADMIN: CPT | Performed by: PHYSICIAN ASSISTANT

## 2018-09-12 PROCEDURE — 86431 RHEUMATOID FACTOR QUANT: CPT | Performed by: PHYSICIAN ASSISTANT

## 2018-09-12 PROCEDURE — 86038 ANTINUCLEAR ANTIBODIES: CPT | Performed by: PHYSICIAN ASSISTANT

## 2018-09-12 PROCEDURE — 82150 ASSAY OF AMYLASE: CPT | Performed by: PHYSICIAN ASSISTANT

## 2018-09-12 PROCEDURE — 85652 RBC SED RATE AUTOMATED: CPT | Performed by: PHYSICIAN ASSISTANT

## 2018-09-12 PROCEDURE — 85025 COMPLETE CBC W/AUTO DIFF WBC: CPT | Performed by: PHYSICIAN ASSISTANT

## 2018-09-12 PROCEDURE — 84443 ASSAY THYROID STIM HORMONE: CPT | Performed by: PHYSICIAN ASSISTANT

## 2018-09-12 PROCEDURE — 87798 DETECT AGENT NOS DNA AMP: CPT | Performed by: PHYSICIAN ASSISTANT

## 2018-09-12 PROCEDURE — 90750 HZV VACC RECOMBINANT IM: CPT | Performed by: PHYSICIAN ASSISTANT

## 2018-09-12 PROCEDURE — 90715 TDAP VACCINE 7 YRS/> IM: CPT | Performed by: PHYSICIAN ASSISTANT

## 2018-09-12 PROCEDURE — 80053 COMPREHEN METABOLIC PANEL: CPT | Performed by: PHYSICIAN ASSISTANT

## 2018-09-12 PROCEDURE — 86618 LYME DISEASE ANTIBODY: CPT | Performed by: PHYSICIAN ASSISTANT

## 2018-09-12 PROCEDURE — 86140 C-REACTIVE PROTEIN: CPT | Performed by: PHYSICIAN ASSISTANT

## 2018-09-12 PROCEDURE — 36415 COLL VENOUS BLD VENIPUNCTURE: CPT | Performed by: PHYSICIAN ASSISTANT

## 2018-09-12 PROCEDURE — 99396 PREV VISIT EST AGE 40-64: CPT | Mod: 25 | Performed by: PHYSICIAN ASSISTANT

## 2018-09-12 PROCEDURE — 90632 HEPA VACCINE ADULT IM: CPT | Performed by: PHYSICIAN ASSISTANT

## 2018-09-12 PROCEDURE — 83690 ASSAY OF LIPASE: CPT | Performed by: PHYSICIAN ASSISTANT

## 2018-09-12 PROCEDURE — 90472 IMMUNIZATION ADMIN EACH ADD: CPT | Performed by: PHYSICIAN ASSISTANT

## 2018-09-12 RX ORDER — ALBUTEROL SULFATE 90 UG/1
2 AEROSOL, METERED RESPIRATORY (INHALATION) EVERY 4 HOURS PRN
Qty: 1 INHALER | Refills: 11 | Status: SHIPPED | OUTPATIENT
Start: 2018-09-12 | End: 2021-08-06

## 2018-09-12 RX ORDER — ATORVASTATIN CALCIUM 20 MG/1
20 TABLET, FILM COATED ORAL DAILY
Qty: 90 TABLET | Refills: 3 | Status: SHIPPED | OUTPATIENT
Start: 2018-09-12 | End: 2019-06-21

## 2018-09-12 RX ORDER — METOPROLOL SUCCINATE 25 MG/1
25 TABLET, EXTENDED RELEASE ORAL DAILY
Qty: 90 TABLET | Refills: 3 | Status: SHIPPED | OUTPATIENT
Start: 2018-09-12 | End: 2019-06-21

## 2018-09-12 RX ORDER — ALPRAZOLAM 0.5 MG
0.5 TABLET ORAL 2 TIMES DAILY PRN
Qty: 10 TABLET | Refills: 0 | Status: SHIPPED | OUTPATIENT
Start: 2018-09-12 | End: 2023-05-25

## 2018-09-12 RX ORDER — LATANOPROST 50 UG/ML
SOLUTION/ DROPS OPHTHALMIC
COMMUNITY
Start: 2018-09-07

## 2018-09-12 NOTE — PATIENT INSTRUCTIONS
"Reflux:  Can weane off to ranitidine (zantac) 150 mg twice daily instead of the omeprazole if preferred.     Healthy Strategies  1. Eat at least 3 meals a day and never skip breakfast.  2. Eat more slowly.  3. Decrease portion size.  4. Provide structure by using meal replacement bars or shakes, and/or low calorie frozen meals.  5. For good nutrition incorporate fruit, vegetables, whole grains, lean protein, and low-fat dairy.  6. Remove trigger foods from yourenvironment to avoid impulse eating.  7. Increase physical activity: get a pedometer and aim for 10,000 steps a day or 30-35 minutes of activity 5 days per week.  8. Weigh yourself daily or at least weekly.  9. Keep a record of what you eat and your activity.  10. Establish a support system such as afriend, group or program.    11. Read Ambrose Laurent's \"Eat to Live\". Remember it is important to have a minimum of 1200 calories a day, okay to use olive oil, 40 grams of fiber daily. No more than two servings (the size of your palm) of red meat a week.     Please consider the following general health recommendations:    Schedule a mammogram annually starting at the age of 40 years old unless recommended earlier by your primary care provider. Come for a general exam once yearly.    Eat a quality diet (generally, low in simple sugars, starches, cholesterol and saturated fat.)    Please get 1500 mg of calcium in divided doses with 1500 units vitamin D in your diet daily. Take supplements as needed to obtain full recommended amounts.     Stay physically active. Regular walking or other exercise is one of the best ways to minimize pain of arthritis; maintain independence and mobility; maintain bone strength; maintain conditioning of your heart. Find something you enjoy and a friend to do it with you.    Maintain ideal weight. Your Body mass index is Body mass index is 31.97 kg/(m^2).. Generally a BMI of 20-25 is considered ideal. Overweight is defined as 25-30, obese is " 30-35 and markedly obese is greater than 35.    Apply sun block (SPF 25 or greater) on exposed skin anytime you are out in the sun to prevent skin cancer.     Wear a seatbelt whenever you are in a car.    Consider a bone density study every 2-5 years to see if you are at increased risk for fracture. If you have osteoporosis, medicine to strengthen your bones can significantly reduce your risk of fracture, back pain, and loss of height.    Colonoscopy (an exam of the colon) is recommended every 10 years after the age of 50 to screen for colon cancer. (More often if you are at increased risk.)    Obtain a flu shot every fall.    Receive a pneumonia shot series after the age of 65 (repeat in 5 years if you have other risk factors). This does not prevent all types of pneumonia, but reduces the risk of the worst bacterial causes of pneumonia.    You should have a tetanus booster at least once every 10 years.    A vaccine to reduce your chances of getting shingles is available if you are over 50. Information about the vaccine is available through the clinic or at:  (http://www.nlm.nih.gov/medlineplus/druginfo/medmaster/c301050.html)    Check blood sugar annually. Cholesterol annually unless you have had a normal level when last checked within 5 years.     I recommend that you have a general physical exam every year. You should have a pap test every 3 years between the ages of 21 and 30 and every 3-5 years between the ages of 30 and 65 depending on your test unless you have had previous abnormal pap smears, (in these cases the exams and PAP's should be done on a schedule as recommended by your primary care provider). If you have had hysterectomy in the past, your future Pap plan may be different.

## 2018-09-12 NOTE — MR AVS SNAPSHOT
"              After Visit Summary   9/12/2018    Nikki Lezama    MRN: [id number]           Patient Information     Date Of Birth          1960        Visit Information        Provider Department      9/12/2018 8:00 AM Kellie Shah PA-C [facility]        Today's Diagnoses     Routine history and physical examination of adult    -  1    History of supraventricular tachycardia        Fear of flying        Mixed hyperlipidemia        Need for shingles vaccine        Need for Tdap vaccination        Need for hepatitis A immunization        Generalized muscle ache        Pain in finger of both hands        RUQ abdominal pain        Perennial allergic rhinitis        Screening for diabetes mellitus        Gastroesophageal reflux disease without esophagitis        Encounter for screening mammogram for breast cancer        Screening for osteoporosis          Care Instructions    Reflux:  Can weane off to ranitidine (zantac) 150 mg twice daily instead of the omeprazole if preferred.     Healthy Strategies  1. Eat at least 3 meals a day and never skip breakfast.  2. Eat more slowly.  3. Decrease portion size.  4. Provide structure by using meal replacement bars or shakes, and/or low calorie frozen meals.  5. For good nutrition incorporate fruit, vegetables, whole grains, lean protein, and low-fat dairy.  6. Remove trigger foods from yourenvironment to avoid impulse eating.  7. Increase physical activity: get a pedometer and aim for 10,000 steps a day or 30-35 minutes of activity 5 days per week.  8. Weigh yourself daily or at least weekly.  9. Keep a record of what you eat and your activity.  10. Establish a support system such as afriend, group or program.    11. Read Ambrose Laurent's \"Eat to Live\". Remember it is important to have a minimum of 1200 calories a day, okay to use olive oil, 40 grams of fiber daily. No more than two servings (the size of your palm) of red meat a week.     Please " consider the following general health recommendations:    Schedule a mammogram annually starting at the age of 40 years old unless recommended earlier by your primary care provider. Come for a general exam once yearly.    Eat a quality diet (generally, low in simple sugars, starches, cholesterol and saturated fat.)    Please get 1500 mg of calcium in divided doses with 1500 units vitamin D in your diet daily. Take supplements as needed to obtain full recommended amounts.     Stay physically active. Regular walking or other exercise is one of the best ways to minimize pain of arthritis; maintain independence and mobility; maintain bone strength; maintain conditioning of your heart. Find something you enjoy and a friend to do it with you.    Maintain ideal weight. Your Body mass index is Body mass index is 31.97 kg/(m^2).. Generally a BMI of 20-25 is considered ideal. Overweight is defined as 25-30, obese is 30-35 and markedly obese is greater than 35.    Apply sun block (SPF 25 or greater) on exposed skin anytime you are out in the sun to prevent skin cancer.     Wear a seatbelt whenever you are in a car.    Consider a bone density study every 2-5 years to see if you are at increased risk for fracture. If you have osteoporosis, medicine to strengthen your bones can significantly reduce your risk of fracture, back pain, and loss of height.    Colonoscopy (an exam of the colon) is recommended every 10 years after the age of 50 to screen for colon cancer. (More often if you are at increased risk.)    Obtain a flu shot every fall.    Receive a pneumonia shot series after the age of 65 (repeat in 5 years if you have other risk factors). This does not prevent all types of pneumonia, but reduces the risk of the worst bacterial causes of pneumonia.    You should have a tetanus booster at least once every 10 years.    A vaccine to reduce your chances of getting shingles is available if you are over 50. Information about the  vaccine is available through the clinic or at:  (http://www.nlm.nih.gov/medlineplus/druginfo/medmaster/v724265.html)    Check blood sugar annually. Cholesterol annually unless you have had a normal level when last checked within 5 years.     I recommend that you have a general physical exam every year. You should have a pap test every 3 years between the ages of 21 and 30 and every 3-5 years between the ages of 30 and 65 depending on your test unless you have had previous abnormal pap smears, (in these cases the exams and PAP's should be done on a schedule as recommended by your primary care provider). If you have had hysterectomy in the past, your future Pap plan may be different.               Follow-ups after your visit        Follow-up notes from your care team     Return in about 1 year (around 9/12/2019) for Physical Exam.      Future tests that were ordered for you today     Open Future Orders        Priority Expected Expires Ordered    Amylase Routine  9/12/2019 9/12/2018    Lipase Routine  9/12/2019 9/12/2018    MR Breast Bilateral w/o & w Contrast Routine  9/12/2019 9/12/2018    DX Hip/Pelvis/Spine Routine  9/12/2019 9/12/2018    CBC and Differential Routine  9/12/2019 9/12/2018    Comprehensive Metabolic Panel Routine  9/12/2019 9/12/2018    Ehrlichia Anaplasma Sp by PCR Routine  9/13/2019 9/12/2018    Lyme Disease Ab with reflex to WB Serum Routine  9/12/2019 9/12/2018    Sedimentation Rate (ESR) Routine  9/12/2019 9/12/2018    Uric acid Routine  9/12/2019 9/12/2018    CRP inflammation Routine  9/12/2019 9/12/2018    TSH Routine  9/12/2019 9/12/2018    Rheumatoid factor Routine  9/12/2019 9/12/2018    Anti Nuclear Marilyn IgG by IFA with Reflex Routine  9/12/2019 9/12/2018    Hemoglobin A1c Routine  9/12/2019 9/12/2018            Who to contact     If you have questions or need follow up information about today's clinic visit or your schedule please contact [facility] directly at  "[phone].  Normal or non-critical lab and imaging results will be communicated to you by [employer], letter or phone within 4 business days after the clinic has received the results. If you do not hear from us within 7 days, please contact the clinic through [employer] or phone. If you have a critical or abnormal lab result, we will notify you by phone as soon as possible.  Submit refill requests through [employer] or call your pharmacy and they will forward the refill request to us. Please allow 3 business days for your refill to be completed.          Additional Information About Your Visit        [employer] Information     [employer] gives you secure access to your electronic health record. If you see a primary care provider, you can also send messages to your care team and make appointments. If you have questions, please call your primary care clinic.  If you do not have a primary care provider, please call [phone] and they will assist you.        Care EveryWhere ID     This is your Care EveryWhere ID. This could be used by other organizations to access your [place] medical records  [id number]        Your Vitals Were     Pulse Height BMI (Body Mass Index)             72 5' 1.5\" (1.562 m) 31.97 kg/m2          Blood Pressure from Last 3 Encounters:   09/12/18 120/86   05/17/18 136/76   03/26/18 120/78    Weight from Last 3 Encounters:   09/12/18 172 lb (78 kg)   05/17/18 175 lb 8 oz (79.6 kg)   07/12/17 175 lb (79.4 kg)              We Performed the Following     GH IMM - HC ZOSTER VACCINE RECOMBINANT ADJUVANTED IM NJX (SHINGRIX)     GH IMM-  HEPATITIS A VACCINE (ADULT)     GH IMM-  TDAP VACCINE (BOOSTRIX )          Today's Medication Changes          These changes are accurate as of 9/12/18  8:53 AM.  If you have any questions, ask your nurse or doctor.               These medicines have changed or have updated prescriptions.        Dose/Directions    ALPRAZolam 0.5 MG tablet   Commonly known as:  XANAX   This may " have changed:    - reasons to take this  - additional instructions   Used for:  Fear of flying   Changed by:  Kellie Shah PA-C        Dose:  0.5 mg   Take 1 tablet (0.5 mg) by mouth 2 times daily as needed for anxiety (for flying) For flying   Quantity:  10 tablet   Refills:  0       metoprolol succinate 25 MG 24 hr tablet   Commonly known as:  TOPROL-XL   This may have changed:  See the new instructions.   Used for:  History of supraventricular tachycardia   Changed by:  Kellie Shah PA-C        Dose:  25 mg   Take 1 tablet (25 mg) by mouth daily   Quantity:  90 tablet   Refills:  3         Stop taking these medicines if you haven't already. Please contact your care team if you have questions.     azithromycin 250 MG tablet   Commonly known as:  ZITHROMAX   Stopped by:  Kellie Shah PA-C           predniSONE 20 MG tablet   Commonly known as:  DELTASONE   Stopped by:  Kellie Shah PA-C                Where to get your medicines      These medications were sent to [place] [id number] [facility] - [place], [place] [address]  [address] [facility] [address]     Phone:  [phone]     albuterol 108 (90 Base) MCG/ACT inhaler    atorvastatin 20 MG tablet    metoprolol succinate 25 MG 24 hr tablet    omeprazole 20 MG CR capsule         Some of these will need a paper prescription and others can be bought over the counter.  Ask your nurse if you have questions.     Bring a paper prescription for each of these medications     ALPRAZolam 0.5 MG tablet                Primary Care Provider Office Phone # Fax #    Noris Medellin [phone]       [address] [facility] [address]        Equal Access to Services     [facility]: Hadii quentin hughes Sogilbert, waaxda luqadaha, qaybta kaalmada adeegyada, ramos snow . So [facility] [phone].    ATENCIÓN: Si habla español, tiene a marmolejo disposición servicios gratuitos de asistencia  lingüística. Kareem al [phone].    We comply with applicable federal civil rights laws and [place] laws. We do not discriminate on the basis of race, color, national origin, age, disability, sex, sexual orientation, or gender identity.            Thank you!     Thank you for choosing [facility]  for your care. Our goal is always to provide you with excellent care. Hearing back from our patients is one way we can continue to improve our services. Please take a few minutes to complete the written survey that you may receive in the mail after your visit with us. Thank you!             Your Updated Medication List - Protect others around you: Learn how to safely use, store and throw away your medicines at www.disposemymeds.org.          This list is accurate as of 9/12/18  8:53 AM.  Always use your most recent med list.                   Brand Name Dispense Instructions for use Diagnosis    AEROCHAMBER MINI CHAMBER Radha      For home use.        albuterol 108 (90 Base) MCG/ACT inhaler    PROAIR HFA/PROVENTIL HFA/VENTOLIN HFA    1 Inhaler    Inhale 2 puffs into the lungs every 4 hours as needed for shortness of breath / dyspnea    Perennial allergic rhinitis       ALPRAZolam 0.5 MG tablet    XANAX    10 tablet    Take 1 tablet (0.5 mg) by mouth 2 times daily as needed for anxiety (for flying) For flying    Fear of flying       aspirin 81 MG EC tablet      Take 81 mg by mouth daily (with breakfast)        atorvastatin 20 MG tablet    LIPITOR    90 tablet    Take 1 tablet (20 mg) by mouth daily    Mixed hyperlipidemia       fexofenadine 180 MG tablet    ALLEGRA     Take 180 mg by mouth daily        GLUCOSAMINE CHOND COMPLEX/MSM PO      Take 1 tablet by mouth daily        latanoprost 0.005 % ophthalmic solution    XALATAN          metoprolol succinate 25 MG 24 hr tablet    TOPROL-XL    90 tablet    Take 1 tablet (25 mg) by mouth daily    History of supraventricular tachycardia       montelukast  10 MG tablet    SINGULAIR    90 tablet    TAKE 1 TABLET BY MOUTH ONCE DAILY.    Mild persistent asthma without complication       MULTI-VITAMINS Tabs      Take 1 tablet by mouth daily        omeprazole 20 MG CR capsule    priLOSEC    90 capsule    Take 1 capsule (20 mg) by mouth every morning (before breakfast)    Gastroesophageal reflux disease without esophagitis       probiotic Caps      Take 1 capsule by mouth daily        RA KRILL  MG Caps      Take 1 tablet by mouth daily        SM COENZYME Q-10 100 MG Caps capsule   Generic drug:  co-enzyme Q-10      Take 100 mg by mouth daily

## 2018-09-12 NOTE — PROGRESS NOTES
"Nursing Notes:   Yuni Grewal LPN  9/12/2018  8:18 AM  Signed  Patient presents to clinic for annual physical.  Jocelyne Uri HUANG ...... 9/12/2018 8:10 AM    Chief Complaint   Patient presents with     Physical       Initial /86 (BP Location: Right arm, Patient Position: Sitting, Cuff Size: Adult Regular)  Pulse 72  Ht 5' 1.5\" (1.562 m)  Wt 172 lb (78 kg)  BMI 31.97 kg/m2 Estimated body mass index is 31.97 kg/(m^2) as calculated from the following:    Height as of this encounter: 5' 1.5\" (1.562 m).    Weight as of this encounter: 172 lb (78 kg).  Medication Reconciliation: complete    Yuni Grewal LPN      ANNUAL PHYSICAL - FEMALE    HPI: Nikki Lezama who presents for a yearly exam.  Concerns include: trying to get off omeprazole.  Rebound is tough.  Tried 4 times to cough omeprazole however she was unsuccessful. Concerned about cognitive side effects with omeprazole.  Wondering if there is a different medication she can try.  Has increased reflux with discontinuing omeprazole.  Has not tried any other reflux medications.    Over the last month would get occasion pains in RUQ.  Sometimes with diarrhea.  No blood in her stool or urine.  Up-to-date on her colonoscopy.  No nausea or vomiting.  No back pain.  No fevers or chills.  Not exacerbated with food.  No chest pain, palpitations, problems breathing, wheezing, rattling.  No dysuria, frequency, urgency, hematuria.    Currently on Lipitor 20 mg daily.  No side effects noted with medication.  Tolerating well.    Ache all over. Get severe pains in right fingers. Hips, neck, back pain. Been for years. Getting worse. Family of arthritis. Sister has psoriatic arthritis, mother has RA, maternal aunt had RA, nephew has lupus. Cady does not agree with stomach. Would like tick testing and other testing to rule out concerns.  Joints are not red or hot.  No trauma.  He will use Tylenol occasionally.    Hx SVT. Well controlled with " medication.  Get a few occurrences a year.  Currently taking Toprol-XL 25 mg.  Tolerating the medication well.  No side effects noted.    Gets anxiety with flying. Has tried alprazolam in the past. Worked well.  Tolerates well.  No side effects noted.  Going to [place] next week.  Wondering about getting a refill.    No LMP recorded.   Contraception: postmenopausal  Risk for STI?: no  Last pap: 4/18/2016 - nl pap and HPV  Any hx of abnormal paps:  In her 20s  FH of early CA?: see family history  Cholesterol/DM concerns/screening: UTD  Tobacco?: no  Calcium intake: some  DEXA: need one  Last mammo: need an MRI  Colonoscopy: 4/19/2010 - repeat in 10 years  Immunizations: need Tdap and Hep A #2 and shingrix    Patient Active Problem List    Diagnosis Date Noted     Fatty liver disease, nonalcoholic 05/03/2016     Priority: Medium     MODESTO (generalized anxiety disorder) 06/02/2014     Priority: Medium     Hyperlipidemia 06/02/2014     Priority: Medium     Sensorineural hearing loss, asymmetrical 10/02/2008     Priority: Medium     Esophageal reflux 02/29/2008     Priority: Medium     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,Mosaic 46XY       Past Surgical History:   Procedure Laterality Date     BIOPSY BREAST      1980,1982,benign     COLONOSCOPY      4/19/2010,hemorrhoids o/w nl to TI;rep 10yrs     MAMMOPLASTY AUGMENTATION      No Comments Provided     OOPHORECTOMY      streak ovaries     OTHER SURGICAL HISTORY      BRQ411,LIPOSUCTION     TONSILLECTOMY      No Comments Provided       Family History   Problem Relation Age of Onset     Thyroid Disease Mother      Thyroid Disease     HEART DISEASE Mother      Heart Disease     Cancer Mother      Cancer,skin     Diabetes Mother      Diabetes     Hyperlipidemia Mother      Hyperlipidemia     Hypertension Mother      Hypertension     Other - See Comments Mother      Stroke     Diabetes Father      Diabetes     HEART DISEASE Father      Heart Disease     Hyperlipidemia Father      Hyperlipidemia     Hypertension Father      Hypertension     Other - See Comments Father 73     Stroke     Hyperlipidemia Brother      Myocardial Infarction Brother      Hypertension Brother      Hyperlipidemia Brother      Myocardial Infarction Brother      Hypertension Brother      Hyperlipidemia Sister      Hypertension Sister      Diabetes Sister      Hyperlipidemia Sister      Diabetes Sister      Diabetes Maternal Grandmother      Diabetes     Other - See Comments Maternal Grandmother      Stroke     Diabetes Paternal Grandmother      Diabetes     Other - See Comments Paternal Grandfather      Stroke     Asthma Sister      Hyperlipidemia Sister      Hypertension Sister      Myocardial Infarction Sister      Anesthesia Reaction No family hx of      Anesthesia Problem     Blood Disease No family hx of      Blood Disease       Social History     Social History     Marital status:       Spouse name: N/A     Number of children: N/A     Years of education: N/A     Occupational History     Not on file.     Social History Main Topics     Smoking status: Never Smoker     Smokeless tobacco: Never Used     Alcohol use 0.0 oz/week      Comment: 3 per week     Drug use: No     Sexual activity: Yes     Partners: Male     Other Topics Concern     Not on file     Social History Narrative    Retired to [employer] 2014. 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  2 adopted children with special needs.       Current Outpatient Prescriptions   Medication Sig Dispense Refill     albuterol (PROAIR HFA/PROVENTIL HFA/VENTOLIN HFA) 108 (90 Base) MCG/ACT inhaler Inhale 2 puffs into the lungs every 4 hours as needed for shortness of breath / dyspnea 1 Inhaler 11     ALPRAZolam (XANAX) 0.5 MG tablet Take 1 tablet (0.5 mg) by mouth 2 times daily as needed for anxiety (for flying) For flying 10 tablet 0     aspirin EC 81 MG EC tablet Take 81 mg by mouth daily (with breakfast)       atorvastatin (LIPITOR) 20 MG tablet Take 1 tablet (20 mg) by mouth daily 90 tablet 3     co-enzyme Q-10 (SM COENZYME Q-10) 100 MG CAPS capsule Take 100 mg by mouth daily       fexofenadine (ALLEGRA) 180 MG tablet Take 180 mg by mouth daily       latanoprost (XALATAN) 0.005 % ophthalmic solution        metoprolol succinate (TOPROL-XL) 25 MG 24 hr tablet Take 1 tablet (25 mg) by mouth daily 90 tablet 3     Misc Natural Products (GLUCOSAMINE CHOND COMPLEX/MSM PO) Take 1 tablet by mouth daily       montelukast (SINGULAIR) 10 MG tablet TAKE 1 TABLET BY MOUTH ONCE DAILY. 90 tablet 2     Multiple Vitamin (MULTI-VITAMINS) TABS Take 1 tablet by mouth daily       omeprazole (PRILOSEC) 20 MG CR capsule Take 1 capsule (20 mg) by mouth every morning (before breakfast) 90 capsule 3     probiotic CAPS Take 1 capsule by mouth daily       RA KRILL  MG CAPS Take 1 tablet by mouth daily       Spacer/Aero-Holding Chambers (AEROCHAMBER MINI CHAMBER) HORACE For  "home use.         Allergies   Allergen Reactions     Cats      Other reaction(s): Runny Nose     Dust Mites Other (See Comments) and Itching     Sneezing, watery eyes  Sneezing, watery eyes     Ragweeds      Other reaction(s): Runny Nose  Sneezing, watery eyes       REVIEW OF SYSTEMS:  Refer to HPI.    PHYSICAL EXAM:  /86 (BP Location: Right arm, Patient Position: Sitting, Cuff Size: Adult Regular)  Pulse 72  Ht 5' 1.5\" (1.562 m)  Wt 172 lb (78 kg)  BMI 31.97 kg/m2  CONSTITUTIONAL:  Alert,cooperative, NAD.  EYES: No scleral icterus.  PERRLA.  Conjunctiva clear.  ENT/MOUTH: External ears and nose normal.  TMs normal.  Moist mucous membranes. Oropharynx clear.    ENDO: No thyromegaly or thyroidnodules.  LYMPH:  No cervical or supraclavicular LA.    BREASTS: No skin abnormalities, no erythema.  No discrete masses.  No nipple discharge, no axillary, supra- or infraclavicular LA.   CARDIOVASCULAR: Regular,S1, S2.  No S3 or S4.  No murmur/gallop/rub.  No peripheral edema.  RESPIRATORY: CTA bilaterally, no wheezes, rhonchi or rales.  GI: Bowel sounds wnl.  Soft, nondistended.  No masses or HSM.  No rebound or guarding.  Mild right upper quadrant abdominal pain with palpation.  No CVA tenderness to palpation.  : declined exam  Pap smear obtained: no  MSKEL: Grossly normal ROM.  No clubbing.  INTEGUMENTARY:  Warm, dry.  No rash noted on exposed skin.  NEUROLOGIC: Facies symmetric.  Grossly normal movement and tone.  No tremor.  PSYCHIATRIC: Affect normal.  Speech fluent.      PHQ Depression Screen  PHQ-9 SCORE 7/11/2016 7/12/2017 5/17/2018   Total Score 1 5 0         ASSESSMENT AND PLAN:    1. Routine history and physical examination of adult    2. History of supraventricular tachycardia    3. Fear of flying    4. Mixed hyperlipidemia    5. Need for shingles vaccine    6. Need for Tdap vaccination    7. Need for hepatitis A immunization    8. Generalized muscle ache    9. Pain in finger of both hands    10. RUQ " abdominal pain    11. Perennial allergic rhinitis    12. Screening for diabetes mellitus    13. Gastroesophageal reflux disease without esophagitis    14. Encounter for screening mammogram for breast cancer    15. Screening for osteoporosis        Completed labs for monitoring including CBC, CMP, lymes, ehrlichiosis and anaplasmosis, ESR, CRP, uric acid, TSH, RF, COLLEEN, hemoglobin a1c.     Gave hepatitis A, shingrix and Tdap.     Ordered MRI breast for breast cancer screening with history of implants.     Ordered DEXA scan for osteoporosis screening.    Hyperlipidemia: Completed labs for monitoring.  Refilled Lipitor 20 mg daily.  No acute concerns of the medication at this time.    History of supraventricular tachycardia: Completed labs for monitoring.  Refilled Toprol-XL 25 mg daily.  No acute concerns at this time.  Patient has been stable.    Fear of flying: Refilled alprazolam 0.5 mg tablets quantity 10 with 0 refills.  Patient was given a one time refill of the benzodiazepine medication to use.   Patient was given the side effect profile and the safety concerns with using the controlled medication prescription.   Patient should not share the controlled medication with other people.    website was reviewed and printed.     GERD: Refilled omeprazole 20 mg capsules.  Encouraged patient to try weaning off to ranitidine 150 mg twice daily to see if this still controls her symptoms.  Gave side effect profile of medication.    Right upper quadrant abdominal pain: Completed labs for monitoring including CBC, CMP, amylase, lipase.  Labs are pending.  Offered patient a right upper quadrant ultrasound.  Declined at this time.  She will continue to monitor symptoms.  She will return if symptoms are not coming down or worsening as needed.    Generalized muscle aches and pain in hands: Completed labs for monitoring with family history of arthritis.  Completed tick testing, uric acid, ESR, CRP, factor, COLLEEN, TSH.  Labs are  "pending.  Patient does not tolerate NSAIDs.  Can use Tylenol arthritis if she prefers.  Return as needed for recheck.    Patient Instructions   Reflux:  Can weane off to ranitidine (zantac) 150 mg twice daily instead of the omeprazole if preferred.     Healthy Strategies  1. Eat at least 3 meals a day and never skip breakfast.  2. Eat more slowly.  3. Decrease portion size.  4. Provide structure by using meal replacement bars or shakes, and/or low calorie frozen meals.  5. For good nutrition incorporate fruit, vegetables, whole grains, lean protein, and low-fat dairy.  6. Remove trigger foods from yourenvironment to avoid impulse eating.  7. Increase physical activity: get a pedometer and aim for 10,000 steps a day or 30-35 minutes of activity 5 days per week.  8. Weigh yourself daily or at least weekly.  9. Keep a record of what you eat and your activity.  10. Establish a support system such as afriend, group or program.    11. Read Ambrose Laurent's \"Eat to Live\". Remember it is important to have a minimum of 1200 calories a day, okay to use olive oil, 40 grams of fiber daily. No more than two servings (the size of your palm) of red meat a week.     Please consider the following general health recommendations:    Schedule a mammogram annually starting at the age of 40 years old unless recommended earlier by your primary care provider. Come for a general exam once yearly.    Eat a quality diet (generally, low in simple sugars, starches, cholesterol and saturated fat.)    Please get 1500 mg of calcium in divided doses with 1500 units vitamin D in your diet daily. Take supplements as needed to obtain full recommended amounts.     Stay physically active. Regular walking or other exercise is one of the best ways to minimize pain of arthritis; maintain independence and mobility; maintain bone strength; maintain conditioning of your heart. Find something you enjoy and a friend to do it with you.    Maintain ideal weight. " Your Body mass index is Body mass index is 31.97 kg/(m^2).. Generally a BMI of 20-25 is considered ideal. Overweight is defined as 25-30, obese is 30-35 and markedly obese is greater than 35.    Apply sun block (SPF 25 or greater) on exposed skin anytime you are out in the sun to prevent skin cancer.     Wear a seatbelt whenever you are in a car.    Consider a bone density study every 2-5 years to see if you are at increased risk for fracture. If you have osteoporosis, medicine to strengthen your bones can significantly reduce your risk of fracture, back pain, and loss of height.    Colonoscopy (an exam of the colon) is recommended every 10 years after the age of 50 to screen for colon cancer. (More often if you are at increased risk.)    Obtain a flu shot every fall.    Receive a pneumonia shot series after the age of 65 (repeat in 5 years if you have other risk factors). This does not prevent all types of pneumonia, but reduces the risk of the worst bacterial causes of pneumonia.    You should have a tetanus booster at least once every 10 years.    A vaccine to reduce your chances of getting shingles is available if you are over 50. Information about the vaccine is available through the clinic or at:  (http://www.nlm.nih.gov/medlineplus/druginfo/medmaster/d775643.html)    Check blood sugar annually. Cholesterol annually unless you have had a normal level when last checked within 5 years.     I recommend that you have a general physical exam every year. You should have a pap test every 3 years between the ages of 21 and 30 and every 3-5 years between the ages of 30 and 65 depending on your test unless you have had previous abnormal pap smears, (in these cases the exams and PAP's should be done on a schedule as recommended by your primary care provider). If you have had hysterectomy in the past, your future Pap plan may be different.           Relevant cancer screening discussed.    Counseled on healthy diet,  Calcium and vitamin D intake, and exercise.    Kellie Shah PA-C..................9/12/2018 8:18 AM

## 2018-09-12 NOTE — NURSING NOTE
"Patient presents to clinic for annual physical.  Jocelyne Grewal LPN ...... 9/12/2018 8:10 AM    Chief Complaint   Patient presents with     Physical       Initial /86 (BP Location: Right arm, Patient Position: Sitting, Cuff Size: Adult Regular)  Pulse 72  Ht 5' 1.5\" (1.562 m)  Wt 172 lb (78 kg)  BMI 31.97 kg/m2 Estimated body mass index is 31.97 kg/(m^2) as calculated from the following:    Height as of this encounter: 5' 1.5\" (1.562 m).    Weight as of this encounter: 172 lb (78 kg).  Medication Reconciliation: complete    Yuni Grewal LPN    "

## 2018-09-13 ENCOUNTER — HOSPITAL ENCOUNTER (OUTPATIENT)
Dept: BONE DENSITY | Facility: OTHER | Age: 58
Discharge: HOME | End: 2018-09-13
Attending: PHYSICIAN ASSISTANT | Admitting: PHYSICIAN ASSISTANT
Payer: COMMERCIAL

## 2018-09-13 DIAGNOSIS — Z13.820 SCREENING FOR OSTEOPOROSIS: ICD-10-CM

## 2018-09-13 LAB
ANA SER QL IF: NEGATIVE
B BURGDOR IGG+IGM SER QL: 0.01 (ref 0–0.89)

## 2018-09-13 PROCEDURE — 77080 DXA BONE DENSITY AXIAL: CPT

## 2018-09-15 LAB
A PHAGOCYTOPH DNA BLD QL NAA+PROBE: NOT DETECTED
E CHAFFEENSIS DNA BLD QL NAA+PROBE: NOT DETECTED
E EWINGII DNA SPEC QL NAA+PROBE: NOT DETECTED
EHRLICHIA DNA SPEC QL NAA+PROBE: NOT DETECTED

## 2018-09-29 DIAGNOSIS — E78.2 MIXED HYPERLIPIDEMIA: ICD-10-CM

## 2018-09-29 DIAGNOSIS — K21.9 GASTROESOPHAGEAL REFLUX DISEASE WITHOUT ESOPHAGITIS: ICD-10-CM

## 2018-10-03 RX ORDER — ATORVASTATIN CALCIUM 20 MG/1
TABLET, FILM COATED ORAL
Qty: 90 TABLET | Refills: 4 | OUTPATIENT
Start: 2018-10-03

## 2019-03-12 DIAGNOSIS — J45.30 MILD PERSISTENT ASTHMA WITHOUT COMPLICATION: ICD-10-CM

## 2019-03-13 NOTE — TELEPHONE ENCOUNTER
Routing refill request to provider for review/approval because:  Protocol failed due to last asthma control test 5/17/18    LOV: 9/12/18 with Kellie Gonzalez RN on 3/13/2019 at 10:20 AM

## 2019-03-14 RX ORDER — MONTELUKAST SODIUM 10 MG/1
TABLET ORAL
Qty: 90 TABLET | Refills: 1 | Status: SHIPPED | OUTPATIENT
Start: 2019-03-14 | End: 2019-06-21

## 2019-03-14 NOTE — TELEPHONE ENCOUNTER
Called and spoke with patient after proper verification. Informed patient of below message. Patient stated understanding and no further questions at this time.   Sravanthi Amaya LPN............. March 14, 2019 1:39 PM

## 2019-05-16 ENCOUNTER — MYC MEDICAL ADVICE (OUTPATIENT)
Dept: INTERNAL MEDICINE | Facility: OTHER | Age: 59
End: 2019-05-16

## 2019-06-15 DIAGNOSIS — J45.30 MILD PERSISTENT ASTHMA WITHOUT COMPLICATION: ICD-10-CM

## 2019-06-18 RX ORDER — MONTELUKAST SODIUM 10 MG/1
TABLET ORAL
Qty: 90 TABLET | Refills: 0 | OUTPATIENT
Start: 2019-06-18

## 2019-06-18 NOTE — TELEPHONE ENCOUNTER
Singulair refilled on 3/14/19 #90 x 1 refills to [facility].    Nahomy Gonzalez RN on 6/18/2019 at 3:04 PM

## 2019-06-21 ENCOUNTER — OFFICE VISIT (OUTPATIENT)
Dept: INTERNAL MEDICINE | Facility: OTHER | Age: 59
End: 2019-06-21
Attending: INTERNAL MEDICINE
Payer: COMMERCIAL

## 2019-06-21 VITALS
HEART RATE: 74 BPM | DIASTOLIC BLOOD PRESSURE: 74 MMHG | SYSTOLIC BLOOD PRESSURE: 118 MMHG | HEIGHT: 61 IN | TEMPERATURE: 98.5 F | OXYGEN SATURATION: 96 % | BODY MASS INDEX: 33.3 KG/M2 | RESPIRATION RATE: 18 BRPM | WEIGHT: 176.4 LBS

## 2019-06-21 DIAGNOSIS — K76.0 FATTY (CHANGE OF) LIVER, NOT ELSEWHERE CLASSIFIED: ICD-10-CM

## 2019-06-21 DIAGNOSIS — M25.50 MULTIPLE JOINT PAIN: ICD-10-CM

## 2019-06-21 DIAGNOSIS — K21.9 GASTROESOPHAGEAL REFLUX DISEASE WITHOUT ESOPHAGITIS: ICD-10-CM

## 2019-06-21 DIAGNOSIS — Z86.79 HISTORY OF SUPRAVENTRICULAR TACHYCARDIA: ICD-10-CM

## 2019-06-21 DIAGNOSIS — Z23 NEED FOR SHINGLES VACCINE: ICD-10-CM

## 2019-06-21 DIAGNOSIS — Z79.899 HIGH RISK MEDICATION USE: ICD-10-CM

## 2019-06-21 DIAGNOSIS — R53.83 FATIGUE, UNSPECIFIED TYPE: ICD-10-CM

## 2019-06-21 DIAGNOSIS — J45.30 MILD PERSISTENT ASTHMA WITHOUT COMPLICATION: ICD-10-CM

## 2019-06-21 DIAGNOSIS — E78.2 MIXED HYPERLIPIDEMIA: Primary | ICD-10-CM

## 2019-06-21 LAB
ALBUMIN SERPL-MCNC: 4.7 G/DL (ref 3.5–5.7)
ALP SERPL-CCNC: 65 U/L (ref 34–104)
ALT SERPL W P-5'-P-CCNC: 56 U/L (ref 7–52)
ANION GAP SERPL CALCULATED.3IONS-SCNC: 9 MMOL/L (ref 3–14)
AST SERPL W P-5'-P-CCNC: 35 U/L (ref 13–39)
BASOPHILS # BLD AUTO: 0.1 10E9/L (ref 0–0.2)
BASOPHILS NFR BLD AUTO: 0.7 %
BILIRUB SERPL-MCNC: 0.5 MG/DL (ref 0.3–1)
BUN SERPL-MCNC: 13 MG/DL (ref 7–25)
CALCIUM SERPL-MCNC: 9.8 MG/DL (ref 8.6–10.3)
CHLORIDE SERPL-SCNC: 104 MMOL/L (ref 98–107)
CHOLEST SERPL-MCNC: 207 MG/DL
CO2 SERPL-SCNC: 25 MMOL/L (ref 21–31)
CREAT SERPL-MCNC: 0.76 MG/DL (ref 0.6–1.2)
DEPRECATED CALCIDIOL+CALCIFEROL SERPL-MC: 30.9 NG/ML
DIFFERENTIAL METHOD BLD: NORMAL
EOSINOPHIL # BLD AUTO: 0.2 10E9/L (ref 0–0.7)
EOSINOPHIL NFR BLD AUTO: 2.4 %
ERYTHROCYTE [DISTWIDTH] IN BLOOD BY AUTOMATED COUNT: 13.8 % (ref 10–15)
ERYTHROCYTE [SEDIMENTATION RATE] IN BLOOD BY WESTERGREN METHOD: 8 MM/H (ref 1–15)
GFR SERPL CREATININE-BSD FRML MDRD: 77 ML/MIN/{1.73_M2}
GLUCOSE SERPL-MCNC: 102 MG/DL (ref 70–105)
HCT VFR BLD AUTO: 43.6 % (ref 35–47)
HDLC SERPL-MCNC: 69 MG/DL (ref 23–92)
HGB BLD-MCNC: 14.8 G/DL (ref 11.7–15.7)
IMM GRANULOCYTES # BLD: 0 10E9/L (ref 0–0.4)
IMM GRANULOCYTES NFR BLD: 0.3 %
LDLC SERPL CALC-MCNC: 113 MG/DL
LYMPHOCYTES # BLD AUTO: 2.1 10E9/L (ref 0.8–5.3)
LYMPHOCYTES NFR BLD AUTO: 31.1 %
MAGNESIUM SERPL-MCNC: 1.8 MG/DL (ref 1.9–2.7)
MCH RBC QN AUTO: 30.6 PG (ref 26.5–33)
MCHC RBC AUTO-ENTMCNC: 33.9 G/DL (ref 31.5–36.5)
MCV RBC AUTO: 90 FL (ref 78–100)
MONOCYTES # BLD AUTO: 0.4 10E9/L (ref 0–1.3)
MONOCYTES NFR BLD AUTO: 5.8 %
NEUTROPHILS # BLD AUTO: 4.1 10E9/L (ref 1.6–8.3)
NEUTROPHILS NFR BLD AUTO: 59.7 %
NONHDLC SERPL-MCNC: 138 MG/DL
PLATELET # BLD AUTO: 242 10E9/L (ref 150–450)
POTASSIUM SERPL-SCNC: 3.8 MMOL/L (ref 3.5–5.1)
PROT SERPL-MCNC: 7.6 G/DL (ref 6.4–8.9)
RBC # BLD AUTO: 4.84 10E12/L (ref 3.8–5.2)
SODIUM SERPL-SCNC: 138 MMOL/L (ref 134–144)
T4 FREE SERPL-MCNC: 0.85 NG/DL (ref 0.6–1.6)
TRIGL SERPL-MCNC: 127 MG/DL
TSH SERPL DL<=0.05 MIU/L-ACNC: 1.2 IU/ML (ref 0.34–5.6)
VIT B12 SERPL-MCNC: 313 PG/ML (ref 180–914)
WBC # BLD AUTO: 6.8 10E9/L (ref 4–11)

## 2019-06-21 PROCEDURE — 83735 ASSAY OF MAGNESIUM: CPT | Mod: ZL | Performed by: INTERNAL MEDICINE

## 2019-06-21 PROCEDURE — 82306 VITAMIN D 25 HYDROXY: CPT | Mod: ZL | Performed by: INTERNAL MEDICINE

## 2019-06-21 PROCEDURE — 84439 ASSAY OF FREE THYROXINE: CPT | Mod: ZL | Performed by: INTERNAL MEDICINE

## 2019-06-21 PROCEDURE — 99214 OFFICE O/P EST MOD 30 MIN: CPT | Performed by: INTERNAL MEDICINE

## 2019-06-21 PROCEDURE — 85025 COMPLETE CBC W/AUTO DIFF WBC: CPT | Mod: ZL | Performed by: INTERNAL MEDICINE

## 2019-06-21 PROCEDURE — 84443 ASSAY THYROID STIM HORMONE: CPT | Mod: ZL | Performed by: INTERNAL MEDICINE

## 2019-06-21 PROCEDURE — 80053 COMPREHEN METABOLIC PANEL: CPT | Mod: ZL | Performed by: INTERNAL MEDICINE

## 2019-06-21 PROCEDURE — 85652 RBC SED RATE AUTOMATED: CPT | Mod: ZL | Performed by: INTERNAL MEDICINE

## 2019-06-21 PROCEDURE — 80061 LIPID PANEL: CPT | Mod: ZL | Performed by: INTERNAL MEDICINE

## 2019-06-21 PROCEDURE — 82607 VITAMIN B-12: CPT | Mod: ZL | Performed by: INTERNAL MEDICINE

## 2019-06-21 PROCEDURE — 36415 COLL VENOUS BLD VENIPUNCTURE: CPT | Mod: ZL | Performed by: INTERNAL MEDICINE

## 2019-06-21 RX ORDER — ATORVASTATIN CALCIUM 20 MG/1
20 TABLET, FILM COATED ORAL DAILY
Qty: 90 TABLET | Refills: 4 | Status: SHIPPED | OUTPATIENT
Start: 2019-06-21 | End: 2019-12-09

## 2019-06-21 RX ORDER — METOPROLOL SUCCINATE 25 MG/1
25 TABLET, EXTENDED RELEASE ORAL DAILY
Qty: 90 TABLET | Refills: 4 | Status: SHIPPED | OUTPATIENT
Start: 2019-06-21 | End: 2020-06-22

## 2019-06-21 RX ORDER — MONTELUKAST SODIUM 10 MG/1
1 TABLET ORAL DAILY
Qty: 90 TABLET | Refills: 4 | Status: SHIPPED | OUTPATIENT
Start: 2019-06-21 | End: 2020-06-22

## 2019-06-21 RX ORDER — INFLUENZA A VIRUS A/NEBRASKA/14/2019 (H1N1) ANTIGEN (MDCK CELL DERIVED, PROPIOLACTONE INACTIVATED), INFLUENZA A VIRUS A/DELAWARE/39/2019 (H3N2) ANTIGEN (MDCK CELL DERIVED, PROPIOLACTONE INACTIVATED), INFLUENZA B VIRUS B/SINGAPORE/INFTT-16-0610/2016 ANTIGEN (MDCK CELL DERIVED, PROPIOLACTONE INACTIVATED), INFLUENZA B VIRUS B/DARWIN/7/2019 ANTIGEN (MDCK CELL DERIVED, PROPIOLACTONE INACTIVATED) 15; 15; 15; 15 UG/.5ML; UG/.5ML; UG/.5ML; UG/.5ML
INJECTION, SUSPENSION INTRAMUSCULAR
Refills: 0 | COMMUNITY
Start: 2018-10-26 | End: 2019-12-09

## 2019-06-21 ASSESSMENT — PAIN SCALES - GENERAL: PAINLEVEL: NO PAIN (0)

## 2019-06-21 ASSESSMENT — ANXIETY QUESTIONNAIRES
7. FEELING AFRAID AS IF SOMETHING AWFUL MIGHT HAPPEN: NEARLY EVERY DAY
3. WORRYING TOO MUCH ABOUT DIFFERENT THINGS: NEARLY EVERY DAY
2. NOT BEING ABLE TO STOP OR CONTROL WORRYING: NEARLY EVERY DAY
5. BEING SO RESTLESS THAT IT IS HARD TO SIT STILL: NEARLY EVERY DAY
1. FEELING NERVOUS, ANXIOUS, OR ON EDGE: NEARLY EVERY DAY
IF YOU CHECKED OFF ANY PROBLEMS ON THIS QUESTIONNAIRE, HOW DIFFICULT HAVE THESE PROBLEMS MADE IT FOR YOU TO DO YOUR WORK, TAKE CARE OF THINGS AT HOME, OR GET ALONG WITH OTHER PEOPLE: EXTREMELY DIFFICULT
GAD7 TOTAL SCORE: 21
6. BECOMING EASILY ANNOYED OR IRRITABLE: NEARLY EVERY DAY

## 2019-06-21 ASSESSMENT — PATIENT HEALTH QUESTIONNAIRE - PHQ9
SUM OF ALL RESPONSES TO PHQ QUESTIONS 1-9: 0
5. POOR APPETITE OR OVEREATING: NEARLY EVERY DAY

## 2019-06-21 ASSESSMENT — MIFFLIN-ST. JEOR: SCORE: 1302.53

## 2019-06-21 NOTE — NURSING NOTE
Patient presents to the clinic for annual visit, medication discussion and review.     Medication Reconciliation: complete   Sravanthi Amaya LPN............. June 21, 2019 10:43 AM

## 2019-06-21 NOTE — PROGRESS NOTES
Chief Complaint   Patient presents with     Follow Up     multiple issues          Subjective:   Ms. Lezama is a 59 year old female  seen for follow-up of multiple issues.    She had a physical last fall with a PA.  She reports that she will be running out of her medications after her current refill.      Additionally she has had some issues with increased joint pain and lethargy.  She is also concerned about gained weight over the last year.    She is on several medications and supplements.  She is on atorvastatin for hyperlipidemia.  She is curious if some of her side effects are from this.  She takes metoprolol for SVT.  She typically takes this in the morning.  She continues on omeprazole 20 mg daily for acid reflux and feels that it controls her symptoms.  She does have a history of fatty liver disease and has not had her liver enzymes checked recently.    She  reports that she has never smoked. She has never used smokeless tobacco.    Past medical history reviewed as below:     Past Medical History:   Diagnosis Date     Allergic rhinitis 01/12/2007     Amblyopia of eye     left eye; patching and vision therapy.     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      ROS:   Pertinent  ROS was performed and was negative, including for fevers, chills, chest pain, shortness of breath, increased lower extremity edema, changes in bowel or bladder, blood in the stool, difficulty swallowing, sores in the mouth. No  "other concerns, with exception of HPI above.      Objective:    /74 (BP Location: Right arm, Patient Position: Sitting, Cuff Size: Adult Regular)   Pulse 74   Temp 98.5  F (36.9  C) (Tympanic)   Resp 18   Ht 1.549 m (5' 1\")   Wt 80 kg (176 lb 6.4 oz)   SpO2 96%   BMI 33.33 kg/m    GEN: Vitals reviewed.  Patient is in no acute distress. Cooperative with exam.  HEENT: Normocephalic atraumatic.  Pupils equally round.  No scleral icterus, no conjunctival erythema. Oropharynx with no erythema or exudates. Dentition adequate.  NECK: Supple; no thyromegaly.  No neck, cervical LAD.    CV: Heart regular in rate and rhythm with no murmur.   LUNGS: Lungs clear to auscultation bilaterally.  Chest rise equal bilaterally.  No accessory muscle use.  ABD: Obese, nondistended, nontender to palpation  SKIN: Warm and dry to touch.  No rash on face, arms and legs.    EXT: No clubbing or cyanosis.  No peripheral edema.  No significant swelling of the hand joints.    LABS: Personally ordered/reviewed  Results for orders placed or performed in visit on 06/21/19   Comprehensive Metabolic Panel   Result Value Ref Range    Sodium 138 134 - 144 mmol/L    Potassium 3.8 3.5 - 5.1 mmol/L    Chloride 104 98 - 107 mmol/L    Carbon Dioxide 25 21 - 31 mmol/L    Anion Gap 9 3 - 14 mmol/L    Glucose 102 70 - 105 mg/dL    Urea Nitrogen 13 7 - 25 mg/dL    Creatinine 0.76 0.60 - 1.20 mg/dL    GFR Estimate 77 >60 mL/min/[1.73_m2]    GFR Estimate If Black >90 >60 mL/min/[1.73_m2]    Calcium 9.8 8.6 - 10.3 mg/dL    Bilirubin Total 0.5 0.3 - 1.0 mg/dL    Albumin 4.7 3.5 - 5.7 g/dL    Protein Total 7.6 6.4 - 8.9 g/dL    Alkaline Phosphatase 65 34 - 104 U/L    ALT 56 (H) 7 - 52 U/L    AST 35 13 - 39 U/L   Magnesium   Result Value Ref Range    Magnesium 1.8 (L) 1.9 - 2.7 mg/dL   CBC and Differential   Result Value Ref Range    WBC 6.8 4.0 - 11.0 10e9/L    RBC Count 4.84 3.8 - 5.2 10e12/L    Hemoglobin 14.8 11.7 - 15.7 g/dL    Hematocrit 43.6 35.0 - " 47.0 %    MCV 90 78 - 100 fl    MCH 30.6 26.5 - 33.0 pg    MCHC 33.9 31.5 - 36.5 g/dL    RDW 13.8 10.0 - 15.0 %    Platelet Count 242 150 - 450 10e9/L    Diff Method Automated Method     % Neutrophils 59.7 %    % Lymphocytes 31.1 %    % Monocytes 5.8 %    % Eosinophils 2.4 %    % Basophils 0.7 %    % Immature Granulocytes 0.3 %    Absolute Neutrophil 4.1 1.6 - 8.3 10e9/L    Absolute Lymphocytes 2.1 0.8 - 5.3 10e9/L    Absolute Monocytes 0.4 0.0 - 1.3 10e9/L    Absolute Eosinophils 0.2 0.0 - 0.7 10e9/L    Absolute Basophils 0.1 0.0 - 0.2 10e9/L    Abs Immature Granulocytes 0.0 0 - 0.4 10e9/L   T4, Free   Result Value Ref Range    T4 Free 0.85 0.60 - 1.60 ng/dL   Sedimentation Rate (ESR)   Result Value Ref Range    Sed Rate 8 1 - 15 mm/h   Vitamin B12   Result Value Ref Range    Vitamin B12 313 180 - 914 pg/mL   Vitamin D Total   Result Value Ref Range    Vitamin D Total 30.9 ng/mL   Lipid Panel   Result Value Ref Range    Cholesterol 207 (H) <200 mg/dL    Triglycerides 127 <150 mg/dL    HDL Cholesterol 69 23 - 92 mg/dL    LDL Cholesterol Calculated 113 (H) <100 mg/dL    Non HDL Cholesterol 138 (H) <130 mg/dL   TSH   Result Value Ref Range    Thyrotropin 1.20 0.34 - 5.60 IU/mL           Assessment/Plan:   Mixed hyperlipidemia  -At this time her mood will be renewed however she can consider stopping her statin for a month or 2 to see if she has any improvement in her symptoms.  If not she should restart the medication.  And if so she should let us know.  - atorvastatin (LIPITOR) 20 MG tablet  Dispense: 90 tablet; Refill: 4    History of supraventricular tachycardia  -Given that her SVT is currently very well controlled she can try decreasing her metoprolol to half pill daily and take it at night to see if this helps with her fatigue.  - metoprolol succinate ER (TOPROL-XL) 25 MG 24 hr tablet  Dispense: 90 tablet; Refill: 4    Gastroesophageal reflux disease without esophagitis  -Stable at this time continue  medication  - omeprazole (PRILOSEC) 20 MG DR capsule  Dispense: 90 capsule; Refill: 4    Mild persistent asthma without complication  - montelukast (SINGULAIR) 10 MG tablet  Dispense: 90 tablet; Refill: 4    Fatty (change of) liver, not elsewhere classified  -Liver enzymes are slightly elevated.  Encouraged to work hard on diet exercise.  We will plan to continue to monitor.  - Comprehensive Metabolic Panel    Fatigue, unspecified type  -I suspect the patient's fatigue is multifactorial with weight, medications, diet.  She is encouraged to work on weight loss.  Labs are checked today and overall good.  She can consider stopping some of her supplements.  - CBC and Differential  - T4, Free  - Sedimentation Rate (ESR)  - Vitamin B12  - Vitamin D Total  - Lipid Panel  - TSH    Multiple joint pain  -I suspect that this is secondary to osteoarthritis.  She was encouraged to work on weight loss.  If she continues to have symptoms we can consider a Lyme test however her pattern of joint pain does not necessarily reflect this.  She is to call if she has any worsening issues.  - CBC and Differential  - T4, Free  - Sedimentation Rate (ESR)  - Vitamin B12  - Vitamin D Total  - Lipid Panel  - TSH    High risk medication use  - Comprehensive Metabolic Panel  - Magnesium  - Vitamin B12    Need for shingles vaccine  Rx given  - zoster vaccine recombinant adjuvanted (SHINGRIX) injection  Dispense: 0.5 mL; Refill: 0        Return in about 1 year (around 6/21/2020) for Annual Review.     ULISSES PIRES DO   6/21/2019 11:36 AM    This document was prepared using voice generated softwear. While every attempt was made for accuracy, grammatical errors may exist.

## 2019-06-21 NOTE — PATIENT INSTRUCTIONS
Consider decreasing metoprolol to 1/2 pill and take at night    Ok to stop the statin for 4-6 weeks and see if symptoms improve and if not, restart med.

## 2019-06-22 ASSESSMENT — ANXIETY QUESTIONNAIRES: GAD7 TOTAL SCORE: 21

## 2019-06-24 DIAGNOSIS — N39.0 FREQUENT UTI: ICD-10-CM

## 2019-06-24 RX ORDER — SULFAMETHOXAZOLE/TRIMETHOPRIM 800-160 MG
1 TABLET ORAL 2 TIMES DAILY
Qty: 6 TABLET | Refills: 0 | Status: SHIPPED | OUTPATIENT
Start: 2019-06-24 | End: 2019-12-09

## 2019-06-24 NOTE — TELEPHONE ENCOUNTER
After name and date of birth verified, patient notified of refill.  Appt scheduled 7/8/19 11:30  Tea Garcia LPN.......6/24/2019 3:06 PM

## 2019-06-24 NOTE — TELEPHONE ENCOUNTER
Will you refill short term? Patient past 1 year follow up      3/16/18  Plan  Bactrim x 3 days on demand  Follow up in 1 year

## 2019-07-05 ENCOUNTER — TELEPHONE (OUTPATIENT)
Dept: INTERNAL MEDICINE | Facility: OTHER | Age: 59
End: 2019-07-05

## 2019-07-05 NOTE — TELEPHONE ENCOUNTER
Called and spoke with patient after proper verification. Patient states she will take apt with Noris Medellin, DO on Monday 7/8.     Sravanthi Amaya LPN............. July 5, 2019 1:04 PM

## 2019-07-05 NOTE — TELEPHONE ENCOUNTER
Patient states lower back flare up and would like orders for MRI    Please submit orders and call to schedule/advise    Thank you

## 2019-07-05 NOTE — TELEPHONE ENCOUNTER
"Called and spoke with patient after proper verification. Patient states her back \"is flaring up again, would like an MRI to see what's going on\". Please place MRI if applicable.     Sravanthi Amaya LPN............. July 5, 2019 11:15 AM     "

## 2019-07-08 ENCOUNTER — HOSPITAL ENCOUNTER (OUTPATIENT)
Dept: GENERAL RADIOLOGY | Facility: OTHER | Age: 59
Discharge: HOME | End: 2019-07-08
Attending: INTERNAL MEDICINE | Admitting: INTERNAL MEDICINE
Payer: COMMERCIAL

## 2019-07-08 ENCOUNTER — OFFICE VISIT (OUTPATIENT)
Dept: INTERNAL MEDICINE | Facility: OTHER | Age: 59
End: 2019-07-08
Attending: INTERNAL MEDICINE
Payer: COMMERCIAL

## 2019-07-08 ENCOUNTER — OFFICE VISIT (OUTPATIENT)
Dept: UROLOGY | Facility: OTHER | Age: 59
End: 2019-07-08
Attending: UROLOGY
Payer: COMMERCIAL

## 2019-07-08 VITALS
SYSTOLIC BLOOD PRESSURE: 118 MMHG | RESPIRATION RATE: 16 BRPM | HEART RATE: 74 BPM | WEIGHT: 178 LBS | DIASTOLIC BLOOD PRESSURE: 76 MMHG | BODY MASS INDEX: 33.63 KG/M2

## 2019-07-08 VITALS
HEIGHT: 61 IN | TEMPERATURE: 98.1 F | HEART RATE: 60 BPM | SYSTOLIC BLOOD PRESSURE: 124 MMHG | RESPIRATION RATE: 16 BRPM | DIASTOLIC BLOOD PRESSURE: 74 MMHG | BODY MASS INDEX: 33.63 KG/M2

## 2019-07-08 DIAGNOSIS — Z23 NEED FOR SHINGLES VACCINE: ICD-10-CM

## 2019-07-08 DIAGNOSIS — N39.0 RECURRENT UTI: Primary | ICD-10-CM

## 2019-07-08 DIAGNOSIS — G89.29 CHRONIC BILATERAL LOW BACK PAIN WITHOUT SCIATICA: ICD-10-CM

## 2019-07-08 DIAGNOSIS — G89.29 CHRONIC BILATERAL LOW BACK PAIN WITHOUT SCIATICA: Primary | ICD-10-CM

## 2019-07-08 DIAGNOSIS — M51.369 DDD (DEGENERATIVE DISC DISEASE), LUMBAR: ICD-10-CM

## 2019-07-08 DIAGNOSIS — M41.9 SCOLIOSIS OF LUMBAR SPINE, UNSPECIFIED SCOLIOSIS TYPE: ICD-10-CM

## 2019-07-08 DIAGNOSIS — M54.50 CHRONIC BILATERAL LOW BACK PAIN WITHOUT SCIATICA: Primary | ICD-10-CM

## 2019-07-08 DIAGNOSIS — M54.50 CHRONIC BILATERAL LOW BACK PAIN WITHOUT SCIATICA: ICD-10-CM

## 2019-07-08 PROCEDURE — 72100 X-RAY EXAM L-S SPINE 2/3 VWS: CPT

## 2019-07-08 PROCEDURE — 99213 OFFICE O/P EST LOW 20 MIN: CPT | Performed by: UROLOGY

## 2019-07-08 PROCEDURE — 99213 OFFICE O/P EST LOW 20 MIN: CPT | Performed by: INTERNAL MEDICINE

## 2019-07-08 RX ORDER — SULFAMETHOXAZOLE/TRIMETHOPRIM 800-160 MG
1 TABLET ORAL 2 TIMES DAILY
Qty: 18 TABLET | Refills: 0 | Status: SHIPPED | OUTPATIENT
Start: 2019-07-08 | End: 2019-12-09

## 2019-07-08 ASSESSMENT — ANXIETY QUESTIONNAIRES
2. NOT BEING ABLE TO STOP OR CONTROL WORRYING: NEARLY EVERY DAY
1. FEELING NERVOUS, ANXIOUS, OR ON EDGE: NEARLY EVERY DAY
3. WORRYING TOO MUCH ABOUT DIFFERENT THINGS: NOT AT ALL
6. BECOMING EASILY ANNOYED OR IRRITABLE: NOT AT ALL
5. BEING SO RESTLESS THAT IT IS HARD TO SIT STILL: NOT AT ALL
7. FEELING AFRAID AS IF SOMETHING AWFUL MIGHT HAPPEN: NOT AT ALL
GAD7 TOTAL SCORE: 6
IF YOU CHECKED OFF ANY PROBLEMS ON THIS QUESTIONNAIRE, HOW DIFFICULT HAVE THESE PROBLEMS MADE IT FOR YOU TO DO YOUR WORK, TAKE CARE OF THINGS AT HOME, OR GET ALONG WITH OTHER PEOPLE: NOT DIFFICULT AT ALL

## 2019-07-08 ASSESSMENT — PAIN SCALES - GENERAL
PAINLEVEL: MILD PAIN (3)
PAINLEVEL: MODERATE PAIN (4)

## 2019-07-08 ASSESSMENT — PATIENT HEALTH QUESTIONNAIRE - PHQ9
5. POOR APPETITE OR OVEREATING: NOT AT ALL
SUM OF ALL RESPONSES TO PHQ QUESTIONS 1-9: 0

## 2019-07-08 NOTE — NURSING NOTE
Chief Complaint   Patient presents with     Back Pain   Patient presents to the clinic today for back pain     Medication Reconciliation: completed   Ashtyn Lynn LPN  7/8/2019 3:25 PM

## 2019-07-08 NOTE — NURSING NOTE
Pt presents to clinic for a 1 year follow up for antibiotics    Review of Systems:    Weight loss:    No     Recent fever/chills:  No   Night sweats:   No  Current skin rash:  No   Recent hair loss:  No  Heat intolerance:  No   Cold intolerance:  No  Chest pain:   No   Palpitations:   No  Shortness of breath:  No   Wheezing:   No  Constipation:    Yes   Diarrhea:   Yes   Nausea:   No   Vomiting:   No   Kidney/side pain:  No   Back pain:   Yes  Frequent headaches:  Yes   Dizziness:     No  Leg swelling:   No   Calf pain:    No

## 2019-07-08 NOTE — PROGRESS NOTES
Type of Visit  Established    Chief Complaint  Frequent UTIs    HPI  Ms. Lezama is a 59 year old female with frequent UTIs.  Year ago he started on demand therapy with Bactrim.  He has taken a 3-day course of Bactrim 3 times in the last year.  She denies any symptoms today such as dysuria or fevers.  She is interested in a refill.    The year prior she had developed 2 UTIs.      Review of Systems  I reviewed the ROS with the patient.    Nursing Notes:   Tea Garcia LPN  7/8/2019 11:47 AM  Sign at exiting of workspace  Pt presents to clinic for a 1 year follow up for antibiotics    Review of Systems:    Weight loss:    No     Recent fever/chills:  No   Night sweats:   No  Current skin rash:  No   Recent hair loss:  No  Heat intolerance:  No   Cold intolerance:  No  Chest pain:   No   Palpitations:   No  Shortness of breath:  No   Wheezing:   No  Constipation:    Yes   Diarrhea:   Yes   Nausea:   No   Vomiting:   No   Kidney/side pain:  No   Back pain:   Yes  Frequent headaches:  Yes   Dizziness:     No  Leg swelling:   No   Calf pain:    No          Family History  Family History   Problem Relation Age of Onset     Thyroid Disease Mother      Heart Disease Mother      Cancer Mother         Cancer,skin     Diabetes Mother      Hyperlipidemia Mother      Hypertension Mother      Other - See Comments Mother         Stroke     Diabetes Father      Heart Disease Father      Hyperlipidemia Father      Hypertension Father      Other - See Comments Father 73        Stroke     Hyperlipidemia Brother      Myocardial Infarction Brother      Hypertension Brother      Obesity Brother      Hyperlipidemia Brother      Myocardial Infarction Brother      Hypertension Brother      Obesity Brother      Hyperlipidemia Sister      Hypertension Sister      Diabetes Sister      Obesity Sister      Hyperlipidemia Sister      Diabetes Sister      Obesity Sister      Diabetes Maternal Grandmother         Diabetes     Other - See  Comments Maternal Grandmother         Stroke     Diabetes Paternal Grandmother         Diabetes     Other - See Comments Paternal Grandfather         Stroke     Asthma Sister      Hyperlipidemia Sister      Hypertension Sister      Myocardial Infarction Sister      Anesthesia Reaction No family hx of         Anesthesia Problem     Blood Disease No family hx of         Blood Disease       Physical Exam  Vitals:    07/08/19 1150   BP: 118/76   BP Location: Right arm   Patient Position: Sitting   Cuff Size: Adult Regular   Pulse: 74   Resp: 16   Weight: 80.7 kg (178 lb)     Constitutional: NAD, WDWN.   Cardiovascular: Regular rate.  Pulmonary/Chest: Respirations are even and non-labored bilaterally.  Abdominal: Soft. No distension, tenderness, masses or guarding. No CVA tenderness.  Genitourinary: Nonpalpable bladder.  Extremities: JIMI x 4, Warm. No clubbing.  No cyanosis.    Skin: Pink, warm and dry.  No rashes noted.    Labs  Results for orders placed or performed in visit on 06/21/19   Comprehensive Metabolic Panel   Result Value Ref Range    Sodium 138 134 - 144 mmol/L    Potassium 3.8 3.5 - 5.1 mmol/L    Chloride 104 98 - 107 mmol/L    Carbon Dioxide 25 21 - 31 mmol/L    Anion Gap 9 3 - 14 mmol/L    Glucose 102 70 - 105 mg/dL    Urea Nitrogen 13 7 - 25 mg/dL    Creatinine 0.76 0.60 - 1.20 mg/dL    GFR Estimate 77 >60 mL/min/[1.73_m2]    GFR Estimate If Black >90 >60 mL/min/[1.73_m2]    Calcium 9.8 8.6 - 10.3 mg/dL    Bilirubin Total 0.5 0.3 - 1.0 mg/dL    Albumin 4.7 3.5 - 5.7 g/dL    Protein Total 7.6 6.4 - 8.9 g/dL    Alkaline Phosphatase 65 34 - 104 U/L    ALT 56 (H) 7 - 52 U/L    AST 35 13 - 39 U/L   Magnesium   Result Value Ref Range    Magnesium 1.8 (L) 1.9 - 2.7 mg/dL   CBC and Differential   Result Value Ref Range    WBC 6.8 4.0 - 11.0 10e9/L    RBC Count 4.84 3.8 - 5.2 10e12/L    Hemoglobin 14.8 11.7 - 15.7 g/dL    Hematocrit 43.6 35.0 - 47.0 %    MCV 90 78 - 100 fl    MCH 30.6 26.5 - 33.0 pg    MCHC 33.9  31.5 - 36.5 g/dL    RDW 13.8 10.0 - 15.0 %    Platelet Count 242 150 - 450 10e9/L    Diff Method Automated Method     % Neutrophils 59.7 %    % Lymphocytes 31.1 %    % Monocytes 5.8 %    % Eosinophils 2.4 %    % Basophils 0.7 %    % Immature Granulocytes 0.3 %    Absolute Neutrophil 4.1 1.6 - 8.3 10e9/L    Absolute Lymphocytes 2.1 0.8 - 5.3 10e9/L    Absolute Monocytes 0.4 0.0 - 1.3 10e9/L    Absolute Eosinophils 0.2 0.0 - 0.7 10e9/L    Absolute Basophils 0.1 0.0 - 0.2 10e9/L    Abs Immature Granulocytes 0.0 0 - 0.4 10e9/L   T4, Free   Result Value Ref Range    T4 Free 0.85 0.60 - 1.60 ng/dL   Sedimentation Rate (ESR)   Result Value Ref Range    Sed Rate 8 1 - 15 mm/h   Vitamin B12   Result Value Ref Range    Vitamin B12 313 180 - 914 pg/mL   Vitamin D Total   Result Value Ref Range    Vitamin D Total 30.9 ng/mL   Lipid Panel   Result Value Ref Range    Cholesterol 207 (H) <200 mg/dL    Triglycerides 127 <150 mg/dL    HDL Cholesterol 69 23 - 92 mg/dL    LDL Cholesterol Calculated 113 (H) <100 mg/dL    Non HDL Cholesterol 138 (H) <130 mg/dL   TSH   Result Value Ref Range    Thyrotropin 1.20 0.34 - 5.60 IU/mL     Results for NOLA LEZAMA (MRN [id number]) as of 3/26/2018 13:24   3/26/2018 13:03   Color Urine Yellow   Appearance Urine Clear   Glucose Urine Negative   Bilirubin Urine Negative   Ketones Urine Negative   Specific Gravity Urine 1.025   pH Urine 6.0   Protein Albumin Urine Negative   Urobilinogen Urine 0.2   Nitrite Urine Negative   Blood Urine Small (A)   Leukocyte Esterase Urine Negative   Source PENDING     Post-Void Residual  A post-void residual was measured by ultrasonic bladder scanner.  0 mL (previously recorded)    Assessment  Ms. Lezama is a 59 year old female with frequent UTIs.  She continues doing well with on demand therapy.    Plan  Bactrim x 3 days on demand  Follow up in 1 year

## 2019-07-08 NOTE — PROGRESS NOTES
Chief Complaint   Patient presents with     Back Pain          Subjective:   Ms. Lezama is a 59 year old female  seen for the acute concern today of recurrent back pain.    She reports that she has had issues with chronic low back pain.  She does have known lumbar degenerative disease along with scoliosis.  She describes her pain as on the left side.  She believes she threw it out a week ago lifting her 23 pound granddaughter.  She is curious and seeing orthopedics for possible surgical intervention.  She is not interested in physical therapy currently.  She has done this in the past as she has had ongoing issues with recurrent low back pain for several years.  She is not taking any current medications for it.    She would like her shingles shot if it is available.    She  reports that she has never smoked. She has never used smokeless tobacco.    Past medical history reviewed as below:     Past Medical History:   Diagnosis Date     Allergic rhinitis 01/12/2007     Amblyopia of eye     left eye; patching and vision therapy.     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      ROS:   Pertinent  ROS was performed and was negative, including for fevers, chills, weakness, changes in bowel or bladder.  No other concerns, with exception of HPI above.      Objective:    /74 (BP Location: Right arm, Patient Position: Sitting, Cuff Size: Adult Regular)   Pulse 60   Temp 98.1  F (36.7  C) (Tympanic)   Resp 16    "Ht 1.549 m (5' 1\")   BMI 33.63 kg/m    GEN: Vitals reviewed.  Patient is in no acute distress. Cooperative with exam.  HEENT: Normocephalic atraumatic.  Pupils equally round.  No scleral icterus, no conjunctival erythema.   SKIN: Warm and dry to touch.  No rash on face, arms and legs.  EXT: No clubbing or cyanosis.  No peripheral edema.  MSK:  Gait is normal. ROM of lumbar flexors and extensors is mildly limited secondary to pain.  ROM with rotation and side bending is mildly limited due to pain.  Pain to palpation ofparaspinal muscles on left lumbar region.  No pain to palpation of spine directly.  BUE and BLE sensation is intact.  Notable muscle spasm and hypertrophy in the lumbar paraspinal muscles.  BUE and BLE muscle strength intact.  Td test is negative for SI or hip pain.  straight leg raise is negative.         Assessment/Plan:   - Chronic bilateral low back pain without sciatica  -At this time we obtained repeat x-ray as it has been years since her last one.  It does show multilevel degenerative disease with levoscoliosis.  At this time we will refer her for orthopedics at her facility of choice.  She is to call if she has any interest in physical therapy.  She was encouraged to do regular stretching at home.  She can take Tylenol as needed for pain.  - ORTHOPEDICS ADULT REFERRAL  - XR Lumbar Spine 2/3 Views    Scoliosis of lumbar spine, unspecified scoliosis type  See above  - ORTHOPEDICS ADULT REFERRAL    DDD (degenerative disc disease), lumbar  See above  - ORTHOPEDICS ADULT REFERRAL    Need for shingles vaccine  At this time we will check to see if shingles vaccine is in stock.  If so she will have this completed.      - Return/call as needed for follow-up should any new symptoms develop, for worsening of current symptoms or if symptoms do not resolve with above plan.     ULISSES PIRES DO   7/8/2019 4:03 PM    This document was prepared using voice generated softwear. While every attempt was made " for accuracy, grammatical errors may exist.

## 2019-07-09 ASSESSMENT — ANXIETY QUESTIONNAIRES: GAD7 TOTAL SCORE: 6

## 2019-07-09 ASSESSMENT — ASTHMA QUESTIONNAIRES: ACT_TOTALSCORE: 23

## 2019-07-11 ENCOUNTER — TELEPHONE (OUTPATIENT)
Dept: INTERNAL MEDICINE | Facility: OTHER | Age: 59
End: 2019-07-11

## 2019-07-11 NOTE — TELEPHONE ENCOUNTER
Called and spoke with patient. Full name and  verified. Patient transferred to appointment line to schedule nurse only injection appointment to receive  2nd vaccine on Shingrix.     Mercy Colón LPN on 2019 at 4:15 PM

## 2019-07-19 ENCOUNTER — ALLIED HEALTH/NURSE VISIT (OUTPATIENT)
Dept: FAMILY MEDICINE | Facility: OTHER | Age: 59
End: 2019-07-19
Payer: COMMERCIAL

## 2019-07-19 DIAGNOSIS — Z23 NEED FOR VACCINATION: Primary | ICD-10-CM

## 2019-07-19 PROCEDURE — 90750 HZV VACC RECOMBINANT IM: CPT

## 2019-07-19 PROCEDURE — 90471 IMMUNIZATION ADMIN: CPT

## 2019-07-19 NOTE — PROGRESS NOTES
Pt denies allergies to yeast gelatin neosporin eggs thimerasol or latex or past reactions to vaccinations. Verified name and date of birth  Copy of MIIC given. Pt instructed to wait 15 min post injection in lobby and to report any reactions to nursing.  Mary Sawyer RN on 7/19/2019 at 10:50 AM

## 2019-08-05 ENCOUNTER — TRANSFERRED RECORDS (OUTPATIENT)
Dept: HEALTH INFORMATION MANAGEMENT | Facility: OTHER | Age: 59
End: 2019-08-05

## 2019-09-16 ENCOUNTER — HOSPITAL ENCOUNTER (OUTPATIENT)
Dept: MRI IMAGING | Facility: OTHER | Age: 59
Discharge: HOME | End: 2019-09-16
Attending: PHYSICAL MEDICINE & REHABILITATION | Admitting: FAMILY MEDICINE
Payer: COMMERCIAL

## 2019-09-16 DIAGNOSIS — M54.16 LUMBAR RADICULOPATHY: ICD-10-CM

## 2019-09-16 DIAGNOSIS — M54.50 LOW BACK PAIN: ICD-10-CM

## 2019-09-16 PROCEDURE — 72148 MRI LUMBAR SPINE W/O DYE: CPT

## 2019-09-23 DIAGNOSIS — M77.11 RIGHT LATERAL EPICONDYLITIS: Primary | ICD-10-CM

## 2019-09-26 ENCOUNTER — HOSPITAL ENCOUNTER (OUTPATIENT)
Dept: OCCUPATIONAL THERAPY | Facility: OTHER | Age: 59
Setting detail: THERAPIES SERIES
End: 2019-09-26
Attending: ORTHOPAEDIC SURGERY
Payer: COMMERCIAL

## 2019-09-26 DIAGNOSIS — M77.11 RIGHT LATERAL EPICONDYLITIS: ICD-10-CM

## 2019-09-26 PROCEDURE — 97110 THERAPEUTIC EXERCISES: CPT | Mod: GO | Performed by: OCCUPATIONAL THERAPIST

## 2019-09-26 PROCEDURE — 97140 MANUAL THERAPY 1/> REGIONS: CPT | Mod: GO | Performed by: OCCUPATIONAL THERAPIST

## 2019-09-26 PROCEDURE — 97035 APP MDLTY 1+ULTRASOUND EA 15: CPT | Mod: GO | Performed by: OCCUPATIONAL THERAPIST

## 2019-09-26 PROCEDURE — 97165 OT EVAL LOW COMPLEX 30 MIN: CPT | Mod: GO | Performed by: OCCUPATIONAL THERAPIST

## 2019-09-26 NOTE — PROGRESS NOTES
[facility]      OUTPATIENT OCCUPATIONAL THERAPY HAND EVALUATION  PLAN OF TREATMENT FOR OUTPATIENT REHABILITATION  (COMPLETE FOR INITIAL CLAIMS ONLY)  Patient's Last Name, First Name, M.I.  YOB: 1960  Nikki Lezama                        Provider s Name: [facility] Medical Record No.  [id number]     Onset Date: 04/01/19    Start of Care Date: 09/26/19   Type:     ___PT  _X_OT   ___SLP    Medical Diagnosis:    Rt. Lateral epicondylitis   Occupational Therapy Diagnosis:  Decline in work, self cares, home management     Visits from SOC: 1      _________________________________________________________________________________  Plan of Treatment/Functional Goals:  Planned Therapy Interventions:  Cryotherapy, Ultrasound, Iontophoresis with 4 % dexamethosone, Paraffin, Strengthening, ROM, Stretching, Manual Therapy, Home Program    Iontophoresis with 4 % dexamethosone     Goals                          5. Goal Target Task: Reach shelves to put dishes away, Hold and use vacuum , Hold rag and wash bathtub, Hold rag and wash appliances, Hold and lift pan, Hold and lift plate, Open a tight or new jar, Turn key in lock to open door,  and turn to open a door, Push a shopping cart, Carry a shopping bag       Goal Target Performance Level: No difficulty       Due Date: 11/07/19   6. Goal Target Task: (play guitar)       Goal Target Performance Level: No difficulty       Due Date: 10/31/19                  Treatment Frequency: Therapy Frequency: 2x/week for 8 weeks  Predicated Duration of Therapy Intervention:       Tea Acevedo, OT    Please sign for use of Iontophoresis with 4% Dexamethasone     I CERTIFY THE NEED FOR THESE SERVICES FURNISHED UNDER        THIS PLAN OF TREATMENT AND WHILE UNDER MY CARE     (Physician co-signature of this document indicates review and  certification of the therapy plan).                Certification Period:    to              Referring Physician:  Dr. Catalan   PCP: Dr. Medellin     Initial Assessment        See Epic Evaluation Start of Care Date: 09/26/19

## 2019-09-26 NOTE — PROGRESS NOTES
09/26/19 1500   General Information/History   Start Of Care Date 09/26/19   Referring Physician Dr. Catalan   Orders Evaluate And Treat As Indicated   Orders Date 09/23/19   Medical Diagnosis Rt. Lateral epicondylitis   Previous treatment or current condition Pt. had injjction on 8/5/2019   Past medical history previous epicondylitis   How/Where did it occur With repetition/overuse   Onset date of current episode/exacerbation 04/01/19   Chronicity Chronic   Hand Dominance Right   Affected side Right   Functional limitations perform desired leisure / sports activities;perform activities of daily living  (playing guitar)   Prior level of function Independent ADL   Important Activities playing guitar   Living environment House/[facility]   Employment Status Working in normal job with restrictions   Primary Job Tasks Repetitive tasks   Patient/Family goals statement to get kid of the pain.    Fall Risk Screen   Fall screen completed by OT   Have you fallen 2 or more times in the past year? No   Have you fallen and had an injury in the past year? No   Is patient a fall risk? No   Fall screen comments slipped on grease in resourant and fell   Abuse Screen (yes response referral indicated)   Feels Unsafe at Home or Work/School no   Feels Threatened by Someone no   Does Anyone Try to Keep You From Having Contact with Others or Doing Things Outside Your Home? no   Physical Signs of Abuse Present no   Pain   Pain Primary Pain Report   Primary Pain Report   Scale 3/10   Pain Is Same All Of The Time   Tenderness   Tenderness Lateral Elbow   Overall - Left along wrist extensors.    Lateral Elbow   Left Severe   ROM   ROM AROM   AROM   AROM Elbow   Elbow   Elbow Extension- Left stiffness at end range   Elbow Flexion- Left stiffness at end range   Special Tests   Right Hand Positive For The Following Special Tests   (middle finger resistance aextension dn resistive wrist )   Strength    Avg - Left 59    Avg - Right 56P    Lateral Pinch - Left 16   Lateral Pinch - Right 18P   3 Point Pinch - Left 12   Education Assessment   Preferred Learning Style Listening;Demonstration;Pictures/video   Barriers to Learning No barriers   Therapy Interventions   Planned Therapy Interventions Cryotherapy;Ultrasound;Iontophoresis (list drug);Paraffin;Strengthening;ROM;Stretching;Manual Therapy;Home Program  iontophoresis wiht 4% dexamethosone,    Clinical Impression   Criteria for Skilled Therapeutic Interventions Met yes   OT Diagnosis declin en work, self cares, home mansagement    Influenced by the following impairments Pain;Decreased range of motion;Decreased strength   Assessment of Occupational Performance 1-3 Performance Deficits   Identified Performance Deficits grasping, pulling, pushing,    Clinical Decision Making (Complexity) Low complexity   Therapy Frequency 2x/week for 8 weeks   Risks and Benefits of Treatment have been explained. Yes   Patient, Family & other staff in agreement with plan of care Yes   Clinical Impression Comments pain and swelling over the latera epicondyle. pain wiht  and pinch with good effort given.    Hand Goals   Hand Goals Household Chores;Work   Household Chores   Current Functional Task Reaching;Washing and drying dishes;Vacuuming;Washing floors;Cooking;Gripping;Turning;Carrying;Pushing   Previous Performance Level Independent   Current Performance Level Moderate difficulty   Goal Target Task Reach shelves to put dishes away;Hold and use vacuum ;Hold rag and wash bathtub;Hold rag and wash appliances;Hold and lift pan;Hold and lift plate;Open a tight or new jar;Turn key in lock to open door; and turn to open a door;Push a shopping cart;Carry a shopping bag   Goal Target Performance Level No difficulty   Due Date 11/07/19   Work   Current Functional Task Repetitive tasks;Reaching;Assembling;Carrying;Prolonged sitting   Previous Performance Level Independent   Current Performance Level Moderate  difficulty   Goal Target Task   (play guitar)   Goal Target Performance Level No difficulty   Due Date 10/31/19   Total Evaluation Time   OT Eval, Low Complexity Minutes (28144) 20

## 2019-10-15 ENCOUNTER — HOSPITAL ENCOUNTER (OUTPATIENT)
Dept: OCCUPATIONAL THERAPY | Facility: OTHER | Age: 59
Setting detail: THERAPIES SERIES
End: 2019-10-15
Attending: ORTHOPAEDIC SURGERY
Payer: COMMERCIAL

## 2019-10-15 PROCEDURE — 97033 APP MDLTY 1+IONTPHRSIS EA 15: CPT | Mod: GO | Performed by: OCCUPATIONAL THERAPIST

## 2019-10-15 PROCEDURE — 97140 MANUAL THERAPY 1/> REGIONS: CPT | Mod: GO | Performed by: OCCUPATIONAL THERAPIST

## 2019-10-15 PROCEDURE — 97035 APP MDLTY 1+ULTRASOUND EA 15: CPT | Mod: GO | Performed by: OCCUPATIONAL THERAPIST

## 2019-10-15 PROCEDURE — 97110 THERAPEUTIC EXERCISES: CPT | Mod: GO | Performed by: OCCUPATIONAL THERAPIST

## 2019-10-17 ENCOUNTER — HOSPITAL ENCOUNTER (OUTPATIENT)
Dept: OCCUPATIONAL THERAPY | Facility: OTHER | Age: 59
Setting detail: THERAPIES SERIES
End: 2019-10-17
Attending: ORTHOPAEDIC SURGERY
Payer: COMMERCIAL

## 2019-10-17 PROCEDURE — 97035 APP MDLTY 1+ULTRASOUND EA 15: CPT | Mod: GO | Performed by: OCCUPATIONAL THERAPIST

## 2019-10-17 PROCEDURE — 97033 APP MDLTY 1+IONTPHRSIS EA 15: CPT | Mod: GO | Performed by: OCCUPATIONAL THERAPIST

## 2019-10-17 PROCEDURE — 97110 THERAPEUTIC EXERCISES: CPT | Mod: GO | Performed by: OCCUPATIONAL THERAPIST

## 2019-10-17 PROCEDURE — 97140 MANUAL THERAPY 1/> REGIONS: CPT | Mod: GO | Performed by: OCCUPATIONAL THERAPIST

## 2019-10-23 ENCOUNTER — HOSPITAL ENCOUNTER (OUTPATIENT)
Dept: OCCUPATIONAL THERAPY | Facility: OTHER | Age: 59
Setting detail: THERAPIES SERIES
End: 2019-10-23
Attending: ORTHOPAEDIC SURGERY
Payer: COMMERCIAL

## 2019-10-23 PROCEDURE — 97010 HOT OR COLD PACKS THERAPY: CPT | Mod: GO | Performed by: OCCUPATIONAL THERAPIST

## 2019-10-23 PROCEDURE — 97110 THERAPEUTIC EXERCISES: CPT | Mod: GO | Performed by: OCCUPATIONAL THERAPIST

## 2019-10-23 PROCEDURE — 97140 MANUAL THERAPY 1/> REGIONS: CPT | Mod: GO | Performed by: OCCUPATIONAL THERAPIST

## 2019-10-23 PROCEDURE — 97035 APP MDLTY 1+ULTRASOUND EA 15: CPT | Mod: GO | Performed by: OCCUPATIONAL THERAPIST

## 2019-10-23 PROCEDURE — 97033 APP MDLTY 1+IONTPHRSIS EA 15: CPT | Mod: GO | Performed by: OCCUPATIONAL THERAPIST

## 2019-10-25 ENCOUNTER — HOSPITAL ENCOUNTER (OUTPATIENT)
Dept: OCCUPATIONAL THERAPY | Facility: OTHER | Age: 59
Setting detail: THERAPIES SERIES
End: 2019-10-25
Attending: ORTHOPAEDIC SURGERY
Payer: COMMERCIAL

## 2019-10-25 PROCEDURE — 97140 MANUAL THERAPY 1/> REGIONS: CPT | Mod: GO | Performed by: OCCUPATIONAL THERAPIST

## 2019-10-25 PROCEDURE — 97033 APP MDLTY 1+IONTPHRSIS EA 15: CPT | Mod: GO | Performed by: OCCUPATIONAL THERAPIST

## 2019-10-25 PROCEDURE — 97035 APP MDLTY 1+ULTRASOUND EA 15: CPT | Mod: GO | Performed by: OCCUPATIONAL THERAPIST

## 2019-10-25 PROCEDURE — 97010 HOT OR COLD PACKS THERAPY: CPT | Mod: GO | Performed by: OCCUPATIONAL THERAPIST

## 2019-10-25 PROCEDURE — 97110 THERAPEUTIC EXERCISES: CPT | Mod: GO | Performed by: OCCUPATIONAL THERAPIST

## 2019-11-01 ENCOUNTER — HOSPITAL ENCOUNTER (OUTPATIENT)
Dept: OCCUPATIONAL THERAPY | Facility: OTHER | Age: 59
Setting detail: THERAPIES SERIES
End: 2019-11-01
Attending: ORTHOPAEDIC SURGERY
Payer: COMMERCIAL

## 2019-11-01 PROCEDURE — 97140 MANUAL THERAPY 1/> REGIONS: CPT | Mod: GO | Performed by: OCCUPATIONAL THERAPIST

## 2019-11-01 PROCEDURE — 97033 APP MDLTY 1+IONTPHRSIS EA 15: CPT | Mod: GO | Performed by: OCCUPATIONAL THERAPIST

## 2019-11-01 PROCEDURE — 97110 THERAPEUTIC EXERCISES: CPT | Mod: GO | Performed by: OCCUPATIONAL THERAPIST

## 2019-11-01 PROCEDURE — 97035 APP MDLTY 1+ULTRASOUND EA 15: CPT | Mod: GO | Performed by: OCCUPATIONAL THERAPIST

## 2019-12-04 ENCOUNTER — TELEPHONE (OUTPATIENT)
Dept: INTERNAL MEDICINE | Facility: OTHER | Age: 59
End: 2019-12-04

## 2019-12-04 NOTE — TELEPHONE ENCOUNTER
Reason for call: Patient wanting a work in appointment.    Patient is having the following symptoms:  Stress and Anxiety    The patient is requesting an appointment with  [facility]    Was an appointment offered for this call? No    If Yes, what is the date of the appointment?  Next available for a long was in 01/16/2019     Preferred method for responding to this message: Telephone Call    Phone number patient can be reached at? Cell number on file:    Telephone Information:   Mobile [phone]       If we can't reach you directly, may we leave a detailed response at the number you provided? Yes    Can this message wait until your PCP/provider returns if unavailable today? Yes    Magali Viera on 12/4/2019 at 9:37 AM

## 2019-12-04 NOTE — TELEPHONE ENCOUNTER
Called and spoke with patient after proper verification. Patient states she is having an increase of stress and anxiety. Was placed on Zoloft about 8 years ago and it helped, but didn't like some s/e from the medications. Patient is looking to get in to see Noris Medellin DO. Patient placed in the schedule.     Sravanthi Amaya LPN............. December 4, 2019 9:43 AM

## 2019-12-09 ENCOUNTER — OFFICE VISIT (OUTPATIENT)
Dept: INTERNAL MEDICINE | Facility: OTHER | Age: 59
End: 2019-12-09
Attending: INTERNAL MEDICINE
Payer: COMMERCIAL

## 2019-12-09 VITALS
DIASTOLIC BLOOD PRESSURE: 78 MMHG | HEART RATE: 88 BPM | TEMPERATURE: 98.1 F | OXYGEN SATURATION: 97 % | SYSTOLIC BLOOD PRESSURE: 138 MMHG

## 2019-12-09 DIAGNOSIS — E78.2 MIXED HYPERLIPIDEMIA: ICD-10-CM

## 2019-12-09 DIAGNOSIS — R74.8 ELEVATED LIVER ENZYMES: ICD-10-CM

## 2019-12-09 DIAGNOSIS — F41.8 SITUATIONAL ANXIETY: Primary | ICD-10-CM

## 2019-12-09 DIAGNOSIS — K76.0 FATTY (CHANGE OF) LIVER, NOT ELSEWHERE CLASSIFIED: ICD-10-CM

## 2019-12-09 DIAGNOSIS — M51.369 DDD (DEGENERATIVE DISC DISEASE), LUMBAR: ICD-10-CM

## 2019-12-09 PROCEDURE — 99214 OFFICE O/P EST MOD 30 MIN: CPT | Performed by: INTERNAL MEDICINE

## 2019-12-09 RX ORDER — CITALOPRAM HYDROBROMIDE 10 MG/1
10 TABLET ORAL DAILY
Qty: 90 TABLET | Refills: 1 | Status: SHIPPED | OUTPATIENT
Start: 2019-12-09 | End: 2020-06-22

## 2019-12-09 RX ORDER — INFLUENZA A VIRUS A/GUANGDONG-MAONAN/SWL1536/2019 CNIC-1909 (H1N1) ANTIGEN (FORMALDEHYDE INACTIVATED), INFLUENZA A VIRUS A/HONG KONG/2671/2019 (H3N2) ANTIGEN (FORMALDEHYDE INACTIVATED), INFLUENZA B VIRUS B/PHUKET/3073/2013 ANTIGEN (FORMALDEHYDE INACTIVATED), AND INFLUENZA B VIRUS B/WASHINGTON/02/2019 ANTIGEN (FORMALDEHYDE INACTIVATED) 15; 15; 15; 15 UG/.5ML; UG/.5ML; UG/.5ML; UG/.5ML
INJECTION, SUSPENSION INTRAMUSCULAR
COMMUNITY
Start: 2019-09-23 | End: 2019-12-09

## 2019-12-09 RX ORDER — ROSUVASTATIN CALCIUM 10 MG/1
10 TABLET, COATED ORAL DAILY
Qty: 90 TABLET | Refills: 1 | Status: SHIPPED | OUTPATIENT
Start: 2019-12-09 | End: 2020-05-19

## 2019-12-09 ASSESSMENT — ANXIETY QUESTIONNAIRES
IF YOU CHECKED OFF ANY PROBLEMS ON THIS QUESTIONNAIRE, HOW DIFFICULT HAVE THESE PROBLEMS MADE IT FOR YOU TO DO YOUR WORK, TAKE CARE OF THINGS AT HOME, OR GET ALONG WITH OTHER PEOPLE: EXTREMELY DIFFICULT
6. BECOMING EASILY ANNOYED OR IRRITABLE: NEARLY EVERY DAY
GAD7 TOTAL SCORE: 21
3. WORRYING TOO MUCH ABOUT DIFFERENT THINGS: NEARLY EVERY DAY
2. NOT BEING ABLE TO STOP OR CONTROL WORRYING: NEARLY EVERY DAY
1. FEELING NERVOUS, ANXIOUS, OR ON EDGE: NEARLY EVERY DAY
5. BEING SO RESTLESS THAT IT IS HARD TO SIT STILL: NEARLY EVERY DAY
7. FEELING AFRAID AS IF SOMETHING AWFUL MIGHT HAPPEN: NEARLY EVERY DAY

## 2019-12-09 ASSESSMENT — PAIN SCALES - GENERAL: PAINLEVEL: MODERATE PAIN (4)

## 2019-12-09 ASSESSMENT — PATIENT HEALTH QUESTIONNAIRE - PHQ9
5. POOR APPETITE OR OVEREATING: NEARLY EVERY DAY
SUM OF ALL RESPONSES TO PHQ QUESTIONS 1-9: 21

## 2019-12-09 NOTE — NURSING NOTE
Patient presents to the clinic for increase in stress and anxiety.     Medication Reconciliation: complete   Sravanthi Amaya LPN............. December 9, 2019 1:05 PM

## 2019-12-09 NOTE — PROGRESS NOTES
Chief Complaint   Patient presents with     Anxiety          Subjective:   Ms. Lezama is a 59 year old female  seen for the acute concern today of back pain and anxiety.    She has been having some back issues and had MR in Sept that showed nerve root compression.  She saw neurosurgery who recommended celebrex and gabapentin.  She has been hesitant to use celebrex due to family hx of heart issues.  She also has not used gabapentin for the back.    She has been having increased stress related to her daughters mental health issues and her grandchildren's care.  She is trying to do more therapy including family therapy.  She is not currently on any meds.      She has been feeling achy off-and-on.  She has been atorvastatin and is curious if this has contributed to her achiness.  She has a strong history in the family of heart disease and is hesitant not to be on a statin medication.    She has a history of elevated liver enzymes.  She has been noted to have hepatic steatosis.  It does not appear that she has had previous labs done for hepatitis or other causes of liver disease.  Her last ultrasound was in 2016.    She  reports that she has never smoked. She has never used smokeless tobacco.    Past medical history reviewed as below:     Past Medical History:   Diagnosis Date     Allergic rhinitis 01/12/2007     Amblyopia of eye     left eye; patching and vision therapy.     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      ROS:   Pertinent  ROS was performed and was negative, including for fevers, chills, chest pain, shortness of breath, increased lower extremity edema, changes in bowel or bladder, blood in the stool, difficulty swallowing, sores in the mouth. No other concerns, with exception of HPI above.      Objective:    /78 (BP Location: Right arm, Patient Position: Sitting, Cuff Size: Adult Regular)   Pulse 88   Temp 98.1  F (36.7  C) (Tympanic)   SpO2 97%   Breastfeeding No   GEN: Vitals reviewed.  Patient is in no acute distress. Cooperative with exam.  HEENT: Normocephalic atraumatic.  Pupils equally round.  No scleral icterus, no conjunctival erythema.   LUNGS: Chest rise equal bilaterally.  No accessory muscle use.  SKIN: Warm and dry to touch.  No rash on face, arms and legs.  EXT: No clubbing or cyanosis.  No peripheral edema.     Assessment/Plan:   Situational anxiety  - trial low dose celexa, follow up in 4-6 weeks  - citalopram (CELEXA) 10 MG tablet  Dispense: 90 tablet; Refill: 1    Mixed hyperlipidemia  - switch from lipitor to crestor, could try every other day if needed, recheck in follow up  - rosuvastatin (CRESTOR) 10 MG tablet  Dispense: 90 tablet; Refill: 1  - Lipid Panel    DDD (degenerative disc disease), lumbar  - discussed meds, and cautious with use although could try tylenol as needed.  If need to use gabapentin or celebrex, consider low dose    Fatty (change of) liver, not elsewhere classified  - Imaging shows fatty infiltration; likely etiology obesity  - recommended diet, exercise, weight loss of at least 10%, no alcohol  - will need Hep A/B vaccine  - check platelets to calculate fibrosis score and then q6 months  - if clinical/lab evidence of RAMÍREZ - may need biopsy down the road  - Comprehensive Metabolic Panel    Elevated liver enzymes  -Given history of elevated liver enzymes without a full work-up we will plan to obtain this with upcoming labs.  Additionally she would likely  benefit from a repeat ultrasound to assess for cirrhotic disease.  We will also calculate a fib4  - Ferritin  - Hepatitis B Core Antibody        - Return/call as needed for follow-up should any new symptoms develop, for worsening of current symptoms or if symptoms do not resolve with above plan.    Return in about 6 weeks (around 1/20/2020) for Recheck.     ULISSES PIRES,    12/9/2019 1:21 PM    This document was prepared using voice generated softwear. While every attempt was made for accuracy, grammatical errors may exist.

## 2019-12-10 ASSESSMENT — ASTHMA QUESTIONNAIRES: ACT_TOTALSCORE: 21

## 2019-12-10 ASSESSMENT — ANXIETY QUESTIONNAIRES: GAD7 TOTAL SCORE: 21

## 2020-01-31 ENCOUNTER — OFFICE VISIT (OUTPATIENT)
Dept: INTERNAL MEDICINE | Facility: OTHER | Age: 60
End: 2020-01-31
Attending: INTERNAL MEDICINE
Payer: COMMERCIAL

## 2020-01-31 VITALS
BODY MASS INDEX: 32.69 KG/M2 | OXYGEN SATURATION: 95 % | DIASTOLIC BLOOD PRESSURE: 100 MMHG | WEIGHT: 173 LBS | HEART RATE: 82 BPM | RESPIRATION RATE: 22 BRPM | TEMPERATURE: 99.8 F | SYSTOLIC BLOOD PRESSURE: 148 MMHG

## 2020-01-31 DIAGNOSIS — E78.2 MIXED HYPERLIPIDEMIA: ICD-10-CM

## 2020-01-31 DIAGNOSIS — K76.0 FATTY (CHANGE OF) LIVER, NOT ELSEWHERE CLASSIFIED: ICD-10-CM

## 2020-01-31 DIAGNOSIS — K76.0 HEPATIC STEATOSIS: ICD-10-CM

## 2020-01-31 DIAGNOSIS — R03.0 ELEVATED BLOOD PRESSURE READING: ICD-10-CM

## 2020-01-31 DIAGNOSIS — J20.9 ACUTE BRONCHITIS, UNSPECIFIED ORGANISM: Primary | ICD-10-CM

## 2020-01-31 DIAGNOSIS — F41.8 SITUATIONAL ANXIETY: ICD-10-CM

## 2020-01-31 DIAGNOSIS — R19.5 LOOSE STOOLS: ICD-10-CM

## 2020-01-31 DIAGNOSIS — R74.8 ELEVATED LIVER ENZYMES: ICD-10-CM

## 2020-01-31 LAB
ALBUMIN SERPL-MCNC: 4.5 G/DL (ref 3.5–5.7)
ALP SERPL-CCNC: 74 U/L (ref 34–104)
ALT SERPL W P-5'-P-CCNC: 36 U/L (ref 7–52)
ANION GAP SERPL CALCULATED.3IONS-SCNC: 8 MMOL/L (ref 3–14)
AST SERPL W P-5'-P-CCNC: 25 U/L (ref 13–39)
BILIRUB SERPL-MCNC: 0.3 MG/DL (ref 0.3–1)
BUN SERPL-MCNC: 12 MG/DL (ref 7–25)
CALCIUM SERPL-MCNC: 9.5 MG/DL (ref 8.6–10.3)
CHLORIDE SERPL-SCNC: 101 MMOL/L (ref 98–107)
CHOLEST SERPL-MCNC: 176 MG/DL
CO2 SERPL-SCNC: 29 MMOL/L (ref 21–31)
CREAT SERPL-MCNC: 0.62 MG/DL (ref 0.6–1.2)
FERRITIN SERPL-MCNC: 96 NG/ML (ref 23.9–336.2)
GFR SERPL CREATININE-BSD FRML MDRD: >90 ML/MIN/{1.73_M2}
GLUCOSE SERPL-MCNC: 112 MG/DL (ref 70–105)
HDLC SERPL-MCNC: 57 MG/DL (ref 23–92)
LDLC SERPL CALC-MCNC: 95 MG/DL
NONHDLC SERPL-MCNC: 119 MG/DL
PLATELET # BLD AUTO: 283 10E9/L (ref 150–450)
POTASSIUM SERPL-SCNC: 3.7 MMOL/L (ref 3.5–5.1)
PROT SERPL-MCNC: 7.5 G/DL (ref 6.4–8.9)
SODIUM SERPL-SCNC: 138 MMOL/L (ref 134–144)
TRIGL SERPL-MCNC: 119 MG/DL

## 2020-01-31 PROCEDURE — 80053 COMPREHEN METABOLIC PANEL: CPT | Mod: ZL | Performed by: INTERNAL MEDICINE

## 2020-01-31 PROCEDURE — 85049 AUTOMATED PLATELET COUNT: CPT | Mod: ZL | Performed by: INTERNAL MEDICINE

## 2020-01-31 PROCEDURE — 86704 HEP B CORE ANTIBODY TOTAL: CPT | Mod: ZL | Performed by: INTERNAL MEDICINE

## 2020-01-31 PROCEDURE — 80061 LIPID PANEL: CPT | Mod: ZL | Performed by: INTERNAL MEDICINE

## 2020-01-31 PROCEDURE — 36415 COLL VENOUS BLD VENIPUNCTURE: CPT | Mod: ZL | Performed by: INTERNAL MEDICINE

## 2020-01-31 PROCEDURE — 82728 ASSAY OF FERRITIN: CPT | Mod: ZL | Performed by: INTERNAL MEDICINE

## 2020-01-31 PROCEDURE — 99214 OFFICE O/P EST MOD 30 MIN: CPT | Performed by: INTERNAL MEDICINE

## 2020-01-31 RX ORDER — CODEINE PHOSPHATE AND GUAIFENESIN 10; 100 MG/5ML; MG/5ML
1-2 SOLUTION ORAL
Qty: 180 ML | Refills: 0 | Status: SHIPPED | OUTPATIENT
Start: 2020-01-31 | End: 2020-04-13

## 2020-01-31 ASSESSMENT — PATIENT HEALTH QUESTIONNAIRE - PHQ9: 5. POOR APPETITE OR OVEREATING: SEVERAL DAYS

## 2020-01-31 ASSESSMENT — ANXIETY QUESTIONNAIRES
2. NOT BEING ABLE TO STOP OR CONTROL WORRYING: NEARLY EVERY DAY
3. WORRYING TOO MUCH ABOUT DIFFERENT THINGS: NEARLY EVERY DAY
IF YOU CHECKED OFF ANY PROBLEMS ON THIS QUESTIONNAIRE, HOW DIFFICULT HAVE THESE PROBLEMS MADE IT FOR YOU TO DO YOUR WORK, TAKE CARE OF THINGS AT HOME, OR GET ALONG WITH OTHER PEOPLE: SOMEWHAT DIFFICULT
5. BEING SO RESTLESS THAT IT IS HARD TO SIT STILL: SEVERAL DAYS
1. FEELING NERVOUS, ANXIOUS, OR ON EDGE: NEARLY EVERY DAY
7. FEELING AFRAID AS IF SOMETHING AWFUL MIGHT HAPPEN: MORE THAN HALF THE DAYS
6. BECOMING EASILY ANNOYED OR IRRITABLE: NOT AT ALL
GAD7 TOTAL SCORE: 13

## 2020-01-31 ASSESSMENT — PAIN SCALES - GENERAL: PAINLEVEL: MILD PAIN (2)

## 2020-01-31 NOTE — PROGRESS NOTES
Chief Complaint   Patient presents with     Follow Up     6 week          HPI: Ms. Lezama is a 59 year old female who presents today for follow up of mood and cholesterol.    She has had diarrhea since starting Rosuvastatin and Celexa.  She feels her body aches are better.  Her mood has also improved and she feels much calmer on her citalopram.  Cholesterol is checked today and is significantly improved.    She has had a chest cold the last 5 days with significant coughing.  Low grade fevers.  She has a heat rash with illness and developed that this week.  She has had some body aches and chills.  Her  was sick recently and on antibiotics.  She has some sinus pressure.  No travel.  However [place] 3 [place] half weeks.  She has been taking typhoid prophylaxis the last several days.    She has a history of hepatic steatosis.  Labs were done today to calculate a fib 4 score.  She overall has been doing okay with no signs of further liver failure.    Her blood pressure is a little elevated today.  She is on metoprolol at home.  We discussed that this is likely secondary to acute illness.    She  reports that she has never smoked. She has never used smokeless tobacco.    Past medical history reviewed as below:     Past Medical History:   Diagnosis Date     Allergic rhinitis 01/12/2007     Amblyopia of eye     left eye; patching and vision therapy.     Dysplasia of cervix uteri 01/12/2007    1980s; cryotherapy, Paps normal since     Female infertility     gonadal dysgenesis     Gastro-esophageal reflux disease without esophagitis 02/29/2008     Glaucoma      Lateral epicondylitis of elbow 08/08/2014     Mild persistent asthma, uncomplicated 02/29/2008     Other mechanical complication of breast prosthesis and implant, initial encounter 02/29/2008    ruptured implants     SVT (supraventricular tachycardia) (H) 01/12/2007    controlled with metoprolol     Lino's  "syndrome 01/12/2007    Mosaic 46XY   .      ROS  Pertinent ROS was performed and was negative, including for chest pain, shortness of breath, increased lower extremity edema, changes in bowel or bladder, blood in the stool, difficulty swallowing, sores in the mouth. No other concerns, with exception of HPI above.      EXAM:   BP (!) 148/100 (BP Location: Right arm, Patient Position: Sitting, Cuff Size: Adult Regular)   Pulse 82   Temp 99.8  F (37.7  C) (Tympanic)   Resp 22   Wt 78.5 kg (173 lb)   SpO2 95%   BMI 32.69 kg/m      Estimated body mass index is 32.69 kg/m  as calculated from the following:    Height as of 7/8/19: 1.549 m (5' 1\").    Weight as of this encounter: 78.5 kg (173 lb).      GEN: Vitals reviewed. Healthy appearing. Patient is obviously uncomfortable. Cooperative with exam.  HEENT: Normocephalic atraumatic.  Pupils equally round.  No scleral icterus, no conjunctival erythema. Oropharynx with no significant erythema and no exudates. Dentition adequate.  NECK: Supple; no thyromegaly.  No neck, cervical LAD.   CV: Heart regular in rate and rhythm with no murmur.    LUNGS: Lungs clear to auscultation bilaterally.  Chest rise equal bilaterally.  No accessory muscle use.  ABD:  Obese.  SKIN: Warm and dry to touch.  No rash on face, arms and legs.  EXT: No clubbing or cyanosis.  No peripheral edema.  PSYCH: Mood is good.  Affect appropriate. Speech fluent. Answers questions appropriately and thought process normal.     ASSESSMENT AND PLAN:    Acute bronchitis, unspecified organism  - Likely viral in nature.  Recommend conservative treatment including symptomatic relief as below.    - Discussed reason for no antibiotics and risk of antibiotic use.    - Patient to call if symptoms do not improve and will consider antibiotics at that time.    - Recommend increased fluid intake, humidified air and rest as much as possible.  - guaiFENesin-codeine (ROBITUSSIN AC) 100-10 MG/5ML solution  Dispense: 180 mL; " Refill: 0    Situational anxiety  -Continue on citalopram.  Renew at physical.    Mixed hyperlipidemia  -Lipids from yesterday are reviewed.  Significantly improved with treatment.  Continue on rosuvastatin.    Hepatic steatosis  Liver testing looks good.  Fib 4 score is 0.9 with no indication for biopsy.  Continue to monitor every 6 to 12 months.    Elevated blood pressure reading  - Blood pressure today of(!) 148/100 is not at the goal of<140/90 however with acute pain this is not unexpected  - Continue current regimen.  Continue to monitor at future visits.  - Cautioned patient to monitor with any OTC medications           Return in about 5 months (around 6/30/2020) for Annual Review.    ULISSES PIRES DO   1/31/2020 9:50 AM    This document was prepared using voice generated softwear. While every attempt was made for accuracy, grammatical errors may exist.

## 2020-01-31 NOTE — PATIENT INSTRUCTIONS
Stop Celexa (Citalopram) for 2 weeks and monitor bowel symptoms    If the loose stools continue, stop Rosuvastatin in 2 weeks also.     Your symptoms are most consistent with a viral upper respiratory infection.     Call on Monday or Tuesday if symptoms continue.    For symptomatic relief you may try:    Nasal congestion  - pseudoephedrine 60mg every 4 hours as needed  - afrin nasal spray for no more than 3 consecutive days  -loratadine 10mg once daily as needed     Cough  - Guaifenesin DM (generic Robitussin DM) as needed     Sore throat  - cepacol or other throat lozenges as needed  - gargle salt water (1/2 teaspoon salt in8oz warm water) every 1-2 hours    Headache or sinus pressure or fever  - acetaminophen 1000mg every 6 hours as needed  - ibuprofen 600mgevery 4 hours as needed   - may use oral decongestant If you choose pseudoephedrine, use for only 5-7 days AS DIRECTED. Speak to your pharmacist if you have any concerns about your medications.     General  - get extra rest  - drink plenty of fluid  - avoid tobacco products    You will need to be evaluated if you start to experience:   Fever higher than 102.5 F (39.2 C)   Worsening of current symptoms  Sudden and severe pain in the face and head or trouble seeing or thinking clearly   Swelling or redness around 1 or both eyes  Trouble breathing, chest pain or a stiff neck    *If you are a smoker, try to quit *

## 2020-02-01 ASSESSMENT — ANXIETY QUESTIONNAIRES: GAD7 TOTAL SCORE: 13

## 2020-02-01 ASSESSMENT — ASTHMA QUESTIONNAIRES: ACT_TOTALSCORE: 19

## 2020-02-02 LAB — HBV CORE AB SERPL QL IA: NONREACTIVE

## 2020-02-03 ENCOUNTER — MYC MEDICAL ADVICE (OUTPATIENT)
Dept: INTERNAL MEDICINE | Facility: OTHER | Age: 60
End: 2020-02-03

## 2020-02-03 DIAGNOSIS — J01.90 ACUTE SINUSITIS WITH SYMPTOMS > 10 DAYS: Primary | ICD-10-CM

## 2020-02-11 ENCOUNTER — MYC MEDICAL ADVICE (OUTPATIENT)
Dept: INTERNAL MEDICINE | Facility: OTHER | Age: 60
End: 2020-02-11

## 2020-02-11 DIAGNOSIS — F41.8 SITUATIONAL ANXIETY: Primary | ICD-10-CM

## 2020-02-12 RX ORDER — BUSPIRONE HYDROCHLORIDE 5 MG/1
5 TABLET ORAL 3 TIMES DAILY
Qty: 90 TABLET | Refills: 1 | Status: SHIPPED | OUTPATIENT
Start: 2020-02-12 | End: 2020-06-22

## 2020-02-17 NOTE — PROGRESS NOTES
Outpatient Occupational Therapy Discharge Note     Patient: Nikki Lezama  : 1960    Beginning/End Dates of Reporting Period:  2019 to 2020    Referring Provider: Dr. Catalan    Therapy Diagnosis: decline in self cares ans IADLS      Client Self Report: Contiues to reports no change.      Objective Measurements:         Goals:     5. Goal Target Task: Reach shelves to put dishes away, Hold and use vacuum , Hold rag and wash bathtub, Hold rag and wash appliances, Hold and lift pan, Hold and lift plate, Open a tight or new jar, Turn key in lock to open door,  and turn to open a door, Push a shopping cart, Carry a shopping bag       Goal Target Performance Level: No difficulty       Due Date: 19   6. Goal Target Task: (play guitar)       Goal Target Performance Level: No difficulty       Due Date: 10/31/19        Progress Toward Goals:   Not assessed this period.      Plan:  Discharge from therapy.    Discharge:    Reason for Discharge: Patient chooses to discontinue therapy.  Patient has failed to schedule further appointments.  Medicare G-code: Patient did not attend a final scheduled session prior to discharge. Unable to determine discharge functional status.     Discharge Plan: Patient to continue home program.

## 2020-02-18 ENCOUNTER — MYC MEDICAL ADVICE (OUTPATIENT)
Dept: INTERNAL MEDICINE | Facility: OTHER | Age: 60
End: 2020-02-18

## 2020-02-18 DIAGNOSIS — F41.8 SITUATIONAL ANXIETY: Primary | ICD-10-CM

## 2020-02-19 RX ORDER — FLUOXETINE 10 MG/1
10 CAPSULE ORAL DAILY
Qty: 30 CAPSULE | Refills: 3 | Status: SHIPPED | OUTPATIENT
Start: 2020-02-19 | End: 2020-06-22

## 2020-02-24 DIAGNOSIS — F41.8 SITUATIONAL ANXIETY: ICD-10-CM

## 2020-02-25 RX ORDER — BUSPIRONE HYDROCHLORIDE 5 MG/1
5 TABLET ORAL 3 TIMES DAILY
Qty: 90 TABLET | Refills: 1 | OUTPATIENT
Start: 2020-02-25

## 2020-02-25 NOTE — TELEPHONE ENCOUNTER
Refill denied    Medication filled on 2/22/2020 90# 1 Refill    Garima Gooden RN on 2/25/2020 at 4:04 PM

## 2020-03-10 ENCOUNTER — HEALTH MAINTENANCE LETTER (OUTPATIENT)
Age: 60
End: 2020-03-10

## 2020-04-07 ENCOUNTER — E-VISIT (OUTPATIENT)
Dept: INTERNAL MEDICINE | Facility: OTHER | Age: 60
End: 2020-04-07
Payer: COMMERCIAL

## 2020-04-07 DIAGNOSIS — J20.9 ACUTE BRONCHITIS WITH SYMPTOMS > 10 DAYS: Primary | ICD-10-CM

## 2020-04-07 PROCEDURE — 99421 OL DIG E/M SVC 5-10 MIN: CPT | Performed by: INTERNAL MEDICINE

## 2020-04-08 RX ORDER — AZITHROMYCIN 250 MG/1
TABLET, FILM COATED ORAL
Qty: 6 TABLET | Refills: 0 | Status: SHIPPED | OUTPATIENT
Start: 2020-04-08 | End: 2020-04-13

## 2020-04-13 ENCOUNTER — VIRTUAL VISIT (OUTPATIENT)
Dept: INTERNAL MEDICINE | Facility: OTHER | Age: 60
End: 2020-04-13
Attending: INTERNAL MEDICINE
Payer: COMMERCIAL

## 2020-04-13 DIAGNOSIS — J20.9 ACUTE BRONCHITIS, UNSPECIFIED ORGANISM: Primary | ICD-10-CM

## 2020-04-13 PROCEDURE — 99212 OFFICE O/P EST SF 10 MIN: CPT | Mod: 95 | Performed by: INTERNAL MEDICINE

## 2020-04-13 RX ORDER — PREDNISONE 10 MG/1
TABLET ORAL
Qty: 18 TABLET | Refills: 0 | Status: SHIPPED | OUTPATIENT
Start: 2020-04-13 | End: 2020-06-22

## 2020-04-13 RX ORDER — CODEINE PHOSPHATE AND GUAIFENESIN 10; 100 MG/5ML; MG/5ML
1-2 SOLUTION ORAL EVERY 4 HOURS PRN
Qty: 236 OZ | Refills: 0 | Status: SHIPPED | OUTPATIENT
Start: 2020-04-13 | End: 2020-06-22

## 2020-04-13 NOTE — PROGRESS NOTES
"Nikki Lezama is a 60 year old female who is being evaluated via a billable telephone visit.      The patient has been notified of following:     \"This telephone visit will be conducted via a call between you and your physician/provider. We have found that certain health care needs can be provided without the need for a physical exam.  This service lets us provide the care you need with a short phone conversation.  If a prescription is necessary we can send it directly to your pharmacy.  If lab work is needed we can place an order for that and you can then stop by our lab to have the test done at a later time.    Telephone visits are billed at different rates depending on your insurance coverage. During this emergency period, for some insurers they may be billed the same as an in-person visit.  Please reach out to your insurance provider with any questions.    If during the course of the call the physician/provider feels a telephone visit is not appropriate, you will not be charged for this service.\"    Patient has given verbal consent for Telephone visit?  Yes      Subjective     Nikki Lezama is a 60 year old female who presents to clinic today for the following health issues:    Telephone visit was done today.  This patient has been having a lot of trouble with coughing.  She was treated last week with Zithromax but she tells me that that did nothing to improve her symptoms.  She has not had a fever.  It is mainly a dry cough but occasionally she has a little bit of sputum production.  She does not feel terribly short of breath.  She oftentimes feels somewhat better in the morning but then as the day progresses it seems like she gets a little bit worse.  She took a course of Augmentin over the winter for a cough as well.  She was using some Robitussin with codeine cough syrup which helps at night but she is running low on that.  She does have a history of asthma.  She has a short acting beta agonist inhaler " which has been minimally helpful for her.  She tells me that she can feel some crackling type sensation in her lungs when she exhales.  No definite wheezing.    Reviewed and updated as needed this visit by Provider  Meds         Review of Systems          Objective   She was alert and oriented, her cough was dry and harsh.        Assessment/Plan:  1. Acute bronchitis, unspecified organism  She has already taken a course of antibiotics, I think this is likely viral and it is quite possible that this is exacerbating her asthma.  I elected to put her on a short course of prednisone and I also refilled her cough medicine.  If she is not improved over the next couple of days, I think that she is going to need to come in and have an exam and chest x-ray done.  - guaiFENesin-codeine (ROBITUSSIN AC) 100-10 MG/5ML solution; Take 5-10 mLs by mouth every 4 hours as needed for cough  Dispense: 236 oz; Refill: 0  - predniSONE (DELTASONE) 10 MG tablet; Take 3 tablets (30 mg) by mouth daily for 3 days, THEN 2 tablets (20 mg) daily for 3 days, THEN 1 tablet (10 mg) daily for 3 days.  Dispense: 18 tablet; Refill: 0    No follow-ups on file.      Phone call duration: 11 minutes    KYLE EDOUARD MD

## 2020-05-19 DIAGNOSIS — E78.2 MIXED HYPERLIPIDEMIA: ICD-10-CM

## 2020-05-19 RX ORDER — ROSUVASTATIN CALCIUM 10 MG/1
TABLET, COATED ORAL
Qty: 90 TABLET | Refills: 0 | Status: SHIPPED | OUTPATIENT
Start: 2020-05-19 | End: 2020-06-22

## 2020-05-19 NOTE — TELEPHONE ENCOUNTER
Refill Request for: Rosuvastatin (CRESTOR) 10 MG tablet   Received From: [id number]  Last Written Prescription Date: 12/9/2019 for 90 tablet and 1 refills  LOV: 1/31/2020 with PCP  Next Appointment: 6/22/2020 with PCP  Protocol: [place] Protocol Passed - 05/19/2020 03:55 PM    Prescription approved per [facility] Medication Refill Protocol.      Zuleyka MURRAYN, RN on 5/19/2020 at 3:57 PM

## 2020-06-22 ENCOUNTER — OFFICE VISIT (OUTPATIENT)
Dept: INTERNAL MEDICINE | Facility: OTHER | Age: 60
End: 2020-06-22
Attending: INTERNAL MEDICINE
Payer: COMMERCIAL

## 2020-06-22 VITALS
WEIGHT: 166.8 LBS | BODY MASS INDEX: 31.49 KG/M2 | HEIGHT: 61 IN | HEART RATE: 66 BPM | DIASTOLIC BLOOD PRESSURE: 76 MMHG | RESPIRATION RATE: 12 BRPM | TEMPERATURE: 98.5 F | SYSTOLIC BLOOD PRESSURE: 122 MMHG | OXYGEN SATURATION: 97 %

## 2020-06-22 DIAGNOSIS — Z12.11 SCREENING FOR COLON CANCER: ICD-10-CM

## 2020-06-22 DIAGNOSIS — Z12.39 BREAST CANCER SCREENING: ICD-10-CM

## 2020-06-22 DIAGNOSIS — F41.8 SITUATIONAL ANXIETY: ICD-10-CM

## 2020-06-22 DIAGNOSIS — T85.43XS BREAST IMPLANT RUPTURE, SEQUELA: ICD-10-CM

## 2020-06-22 DIAGNOSIS — K21.9 GASTROESOPHAGEAL REFLUX DISEASE WITHOUT ESOPHAGITIS: ICD-10-CM

## 2020-06-22 DIAGNOSIS — R73.03 PREDIABETES: ICD-10-CM

## 2020-06-22 DIAGNOSIS — J45.30 MILD PERSISTENT ASTHMA WITHOUT COMPLICATION: ICD-10-CM

## 2020-06-22 DIAGNOSIS — Z86.79 HISTORY OF SUPRAVENTRICULAR TACHYCARDIA: ICD-10-CM

## 2020-06-22 DIAGNOSIS — Z79.899 HIGH RISK MEDICATION USE: ICD-10-CM

## 2020-06-22 DIAGNOSIS — M15.0 PRIMARY OSTEOARTHRITIS INVOLVING MULTIPLE JOINTS: ICD-10-CM

## 2020-06-22 DIAGNOSIS — E78.2 MIXED HYPERLIPIDEMIA: ICD-10-CM

## 2020-06-22 DIAGNOSIS — Z20.822 SUSPECTED COVID-19 VIRUS INFECTION: Primary | ICD-10-CM

## 2020-06-22 LAB
ALBUMIN SERPL-MCNC: 4.8 G/DL (ref 3.5–5.7)
ALP SERPL-CCNC: 65 U/L (ref 34–104)
ALT SERPL W P-5'-P-CCNC: 42 U/L (ref 7–52)
ANION GAP SERPL CALCULATED.3IONS-SCNC: 9 MMOL/L (ref 3–14)
AST SERPL W P-5'-P-CCNC: 32 U/L (ref 13–39)
BILIRUB SERPL-MCNC: 0.5 MG/DL (ref 0.3–1)
BUN SERPL-MCNC: 14 MG/DL (ref 7–25)
CALCIUM SERPL-MCNC: 9.8 MG/DL (ref 8.6–10.3)
CHLORIDE SERPL-SCNC: 102 MMOL/L (ref 98–107)
CO2 SERPL-SCNC: 26 MMOL/L (ref 21–31)
CREAT SERPL-MCNC: 0.76 MG/DL (ref 0.6–1.2)
ERYTHROCYTE [DISTWIDTH] IN BLOOD BY AUTOMATED COUNT: 13.2 % (ref 10–15)
GFR SERPL CREATININE-BSD FRML MDRD: 77 ML/MIN/{1.73_M2}
GLUCOSE SERPL-MCNC: 106 MG/DL (ref 70–105)
HCT VFR BLD AUTO: 44.4 % (ref 35–47)
HGB BLD-MCNC: 14.5 G/DL (ref 11.7–15.7)
MAGNESIUM SERPL-MCNC: 2.1 MG/DL (ref 1.9–2.7)
MCH RBC QN AUTO: 30 PG (ref 26.5–33)
MCHC RBC AUTO-ENTMCNC: 32.7 G/DL (ref 31.5–36.5)
MCV RBC AUTO: 92 FL (ref 78–100)
PLATELET # BLD AUTO: 245 10E9/L (ref 150–450)
POTASSIUM SERPL-SCNC: 4.5 MMOL/L (ref 3.5–5.1)
PROT SERPL-MCNC: 7.6 G/DL (ref 6.4–8.9)
RBC # BLD AUTO: 4.84 10E12/L (ref 3.8–5.2)
SODIUM SERPL-SCNC: 137 MMOL/L (ref 134–144)
WBC # BLD AUTO: 7 10E9/L (ref 4–11)

## 2020-06-22 PROCEDURE — 80053 COMPREHEN METABOLIC PANEL: CPT | Mod: ZL | Performed by: INTERNAL MEDICINE

## 2020-06-22 PROCEDURE — 99396 PREV VISIT EST AGE 40-64: CPT | Performed by: INTERNAL MEDICINE

## 2020-06-22 PROCEDURE — 36415 COLL VENOUS BLD VENIPUNCTURE: CPT | Mod: ZL | Performed by: INTERNAL MEDICINE

## 2020-06-22 PROCEDURE — 83735 ASSAY OF MAGNESIUM: CPT | Mod: ZL | Performed by: INTERNAL MEDICINE

## 2020-06-22 PROCEDURE — 86769 SARS-COV-2 COVID-19 ANTIBODY: CPT | Mod: ZL | Performed by: INTERNAL MEDICINE

## 2020-06-22 PROCEDURE — 85027 COMPLETE CBC AUTOMATED: CPT | Mod: ZL | Performed by: INTERNAL MEDICINE

## 2020-06-22 RX ORDER — MULTIVIT,IRON,MINERALS/LUTEIN
1 TABLET ORAL DAILY
COMMUNITY

## 2020-06-22 RX ORDER — ROSUVASTATIN CALCIUM 10 MG/1
10 TABLET, COATED ORAL DAILY
Qty: 90 TABLET | Refills: 4 | Status: SHIPPED | OUTPATIENT
Start: 2020-06-22 | End: 2021-07-22

## 2020-06-22 RX ORDER — FLUOXETINE 10 MG/1
10 CAPSULE ORAL DAILY
Qty: 90 CAPSULE | Refills: 4 | Status: SHIPPED | OUTPATIENT
Start: 2020-06-22 | End: 2021-08-06

## 2020-06-22 RX ORDER — MONTELUKAST SODIUM 10 MG/1
1 TABLET ORAL DAILY
Qty: 90 TABLET | Refills: 4 | Status: SHIPPED | OUTPATIENT
Start: 2020-06-22 | End: 2021-07-22

## 2020-06-22 RX ORDER — METOPROLOL SUCCINATE 25 MG/1
25 TABLET, EXTENDED RELEASE ORAL DAILY
Qty: 90 TABLET | Refills: 4 | Status: SHIPPED | OUTPATIENT
Start: 2020-06-22 | End: 2021-07-22

## 2020-06-22 ASSESSMENT — PATIENT HEALTH QUESTIONNAIRE - PHQ9: SUM OF ALL RESPONSES TO PHQ QUESTIONS 1-9: 3

## 2020-06-22 ASSESSMENT — MIFFLIN-ST. JEOR: SCORE: 1257.15

## 2020-06-22 ASSESSMENT — PAIN SCALES - GENERAL: PAINLEVEL: MILD PAIN (3)

## 2020-06-22 NOTE — PATIENT INSTRUCTIONS
Ice every 3-4 hours, gentle exercise/rest as much as possible.    Caution with NSAIDS (ibuprofen, aspirin, naproxen, aleve, advil) due to risk for increased blood pressure,stomach pain/nausea/ulcers and kidney damage; use minimal amount necessary    -- in addition to --     Tylenol 1000mg - 1300mg (2-2.5 extra strength 500mg tablets or 3-4 regular strength 325mg tablets) three times a day as needed; Maximum of 4000mg daily    - Return/call as needed for follow-up should any new symptoms develop, for worsening of current symptoms or if symptoms do notresolve with above plan.    Patient Education     Osteoarthritis: Coping with Pain    There are many ways to control your pain. You re making a good start by learning about osteoarthritis and its treatments. Knowing more about this condition helps you work with your healthcare provider to find answers to problems. Keeping a positive outlook can help you manage pain from day to day. And making time each day to relax and enjoy yourself may help you control osteoarthritis pain, instead of letting it control you. Try these methods to help you cope with, and even reduce, your pain.  Take control  Relaxing may help relieve muscle aches that result from joint pain. To relax, try these techniques:    Breathe slowly and calmly and think of a peaceful scene.    Meditate by focusing your mind on one word, object, or idea.  Getting plenty of sleep can help reduce pain and let you function better. If pain is making it hard for you to sleep, ask your healthcare provider about ways to control pain and ensure a good night s sleep. Cutting back on caffeine and alcohol can help you sleep better. So can going to bed and getting up at about the same time every day.  Use distraction  Getting your mind off the pain may seem hard to do. But it can actually help reduce pain. When you are in pain, try one of these ways of distracting yourself:    Watch a funny movie with a friend.    Listen to  music you enjoy.    Read a novel.    Talk with friends or family.    Go to a museum, park, or other favorite attraction.    Arrange to do a regular activity, such as volunteer work.  Heat and cold  Using heat and cold treatments are simple ways to lessen arthritis symptoms:    Heat soothes stiff joints and tired muscles. Heat works well before exercise, for example. Heat treatments include:  ? A warm shower or bath, or soak (for example, fill the sink with warm water and move your fingers, hands, and wrists around in the water)  ? A moist heating pad  ? A warm, moist wash cloth  ? An electric blanket or throw    Cold treatments help to numb painful areas and decrease swelling. Cold treatments include the following wrapped in a thin towel:  ? An ice pack or bag of ice. To make an ice pack, put ice cubes in a plastic bag that seals at the top.  ? A gel-filled cold pack  Be careful when using heat or cold. You can injure your skin. Each treatment should only last for 10 to 20 minutes. Your healthcare provider or therapist can give you specific instructions.  Acupuncture  Acupuncture is a 2,000-year-old practice. Providers insert thin needles in specific parts of the body. Research shows that it can help to relieve the pain of arthritis.  For more information or to find a provider in your area, contact the American Academy of Medical Acupuncture. Its website is: www.medicalacupuncture.org/.  Massage  Therapeutic massage has many benefits. It may:    Help you and your muscles relax    Improve blood flow to muscles and joints    Help joints stay more flexible  Look for a certified massage therapist. Many are trained to treat sore muscles and joint pain and stiffness.  Vitamins, supplements, and herbs  People with arthritis, or other long-term conditions that cause pain, often look for alternative ways to lessen pain. Vitamins, supplements, and herbs may or may not help you to feel better. Before you try any vitamin,  supplement, or herb, make sure you ask your healthcare provider or pharmacist.  Physical therapy and occupational therapy  Evaluation by a physical therapist and or occupational therapist for assessment for limitations in activities of daily living  Help with developing an appropriate exercise routine for both muscle strengthening and cardiovascular health  Weight management  Studies have shown that weight loss in overweight people can improve osteoarthritis symptoms.  Talk with your healthcare provider about your optimal ideal weight and weight management methods if needed.  Psychological treatments  Research shows that many psychological therapies or those that deal with thinking and emotions, help people cope with arthritis pain. Therapies include cognitive behavioral therapy (CBT), pain coping skills training, biofeedback, stress management, and hypnosis. Ask your healthcare provider for more information about these therapies.  For more information about many of these methods, contact the National Center for Complementary and Alternative Medicine (NCCAM) at  https://ncc.nih.gov.  Date Last Reviewed: 6/1/2018 2000-2019 [employer]. [address] [place] [address]. All rights reserved. This information is not intended as a substitute for professional medical care. Always follow your healthcare professional's instructions.    Patient Education     Living with Osteoarthritis  Osteoarthritis is a chronic disease and the most common type of arthritis. But it doesn t have to keep you from leading an active life. You can help control symptoms by exercising and losing weight if you are overweight. Using special tools also helps make life easier. Be sure to see your healthcare provider for scheduled checkups and lab work. If you have questions or concerns between office visits, call your healthcare provider's office.  Make exercise part of your life  Gentle exercise can help lessen your pain.  Keep the following in mind:    Choose exercises that improve joint motion and make your muscles stronger. Your healthcare provider or a physical therapist may suggest a few.    Stretching and flexibility activities such as yoga and dary chi may improve pain and joint motion.    Try low-impact sports, such as walking, biking, or doing exercises in a warm pool.    Most people should exercise for at least 30 minutes a day on most days of the week. This can be broken up into shorter periods throughout the day.    Don t push yourself too hard at first. Slowly build up over time.    Make sure you warm up for 5 to 10 minutes before you exercise.    If pain and stiffness increase, don't exercise as hard or as long.  Watch your weight  If you weigh more than you should, your weight-bearing joints are under extra pressure. This makes your symptoms worse. To reduce pain and stiffness, try losing a few of those extra pounds. The tips below may help:    Start a weight-loss program with the help of your healthcare provider.    Ask your friends and family for support.    Join a weight-loss group.  Use special tools  Even simple tasks can be hard to do when your joints hurt. Special tools called assistive devices can make things easier by reducing strain and protecting your joints. Ask your healthcare provider where to find these and other helpful tools:    Long-handled reachers or grabbers    Jar openers and button threaders    Large  for pencils, garden tools, and other handheld objects  Use mobility and other aids  People with arthritis and other joint problems often use mobility aids to help with walking. For example, they may use canes or walkers. They may also use splints or braces to support joints. Talk with your healthcare provider or physical therapist about these aids:    A cane to reduce knee or hip pain and help prevent falls    Splints for your wrists or other joints    A brace to support a weak knee  joint    Orthotics for toe and foot involvement  Medical and surgical treatments  Discuss medical treatments with your healthcare provider to help reduce your pain and improve joint mobility. Treatments may include:    Topical medicines such as lidocaine, capsaicin, and diclofenac gel    Oral medicines such as acetaminophen, NSAIDs (nonsteroidal anti-inflammatory drugs) such as ibuprofen and naproxen, or opioids    Injections in affected joints such as corticosteroids in various joints, or hyaluronic acid in the knee joints    Surgical repair or surgical joint replacement with artificial joints    Complementary therapies such as heat and cold treatment, massage, acupuncture, supplements, cognitive training, meditation, and others. Discuss these options with your healthcare provider.  Date Last Reviewed: 6/1/2018 2000-2019 [employer]. [address] [place] [address], [place] [address]. All rights reserved. This information is not intended as a substitute for professional medical care. Always follow your healthcare professional's instructions.

## 2020-06-22 NOTE — LETTER
June 26, 2020        Nikki Lezama  [id number] [facility] [id number]      COVID-19 Antibody Screen   Date Value Ref Range Status   06/22/2020 Negative  Final     Comment:     No COVID-19 antibodies detected.  Patients within 10 days of symptom onset for   COVID-19 may not produce sufficient levels of detectable antibodies.    Immunocompromised COVID-19 patients may take longer to develop antibodies.       COVID-19 Antibody, IgG Titer   Date Value Ref Range Status   06/22/2020 Not Applicable  Final     Comment:     Qualitative screen for total antibodies to COVID-19 (SARS-CoV-2) with   semi-quantitative measurement of IgG COVID-19 antibodies by endpoint titer.    COVID-19 antibodies may be elevated due to a past or current infection.  Negative results do not rule out COVID-19 infection.  Results from antibody   testing should not be used as the sole basis to diagnose or exclude SARS-CoV-2   infection or to inform infection status.  COVID-19 PCR test should be ordered   if current infection is suspected.  False positive results may occur in rare   cases due to cross-reacting antibodies.  This test was developed and its performance characteristics determined by the   [facility] ([facility]),   which is regulated under CLIA as qualified to perform high-complexity testing.    This test has not been reviewed by the FDA.  Testing performed by [facility], [facility], [address], [address], [place] [address]               You have tested NEGATIVE for COVID-19 antibodies. This suggests you have not had or been exposed to COVID-19. But it does not mean that for sure.     The test finds antibodies in most people 10 days after they get sick. For some people, it takes longer than 10 days for antibodies to show up. Others may never show antibodies against COVID-19, especially if they have weak immune  systems.    If you have COVID-19 symptoms now, please stay home and away from others.     What is antibody testing?    This is a kind of blood test. We take a small sample of your blood, and then test it for something called  antibodies.      Your body makes antibodies to fight infection. If your blood has antibodies for a certain germ, it means you ve been infected with that germ in the past.     Sometimes, antibodies stay in your body for years after you ve had the infection. They can be there even if the germ didn t make you sick. They are a sign that your body fought off the infection.    Will this test find antibodies in everyone who s had COVID-19?    No. The test finds antibodies in most people 10 days after they get sick. For some people, it takes longer than 10 days for antibodies to show up. Others may never show antibodies against COVID-19, especially if they have weak immune systems.    What does it mean if the test finds COVID-19 antibodies?    If we find these antibodies, it suggests:     This person has had the virus.     Their body s immune system fought the virus.     We don t know if this will help protect someone from getting COVID-19 again. Scientists are still learning about this.    What are the signs of COVID-19?    Signs of COVID-19 can appear from 2 to 14 days (up to 2 weeks) after you re infected. Some people have no symptoms or only mild symptoms. Others get very sick. The most common symptoms are:          Cough    Shortness of breath or trouble breathing  Or at least 2 of these symptoms:    Fever    Chills    Repeated shaking with chills    Muscle pain    Headache    Sore throat    Losing your sense of taste or smell    You may have other symptoms. Please contact your doctor or clinic for any symptoms that worry you.    Where can I get more information?     To learn the [place] s guidelines for staying home, please visit the [facility] website at  [email or web address].state.[place]/diseases/coronavirus/basics.html    To learn more about COVID-19 and how to care for yourself at home, please visit the CDC website at https://www.cdc.gov/coronavirus/2019-ncov/about/steps-when-sick.html    For more options for care at [facility], please visit our website at [email or web address]    [place] [facility] ([facility]) COVID-19 Hotline:  [phone]

## 2020-06-22 NOTE — PROGRESS NOTES
Chief Complaint   Patient presents with     Physical     fasting         HPI: Ms. Lezama is a 60 year old female who presents today for yearly physical.  She overall is feeling good.      She has on several medications for her various issues.  She denies any side effects.  She has had a lot of anxiety related to her daughter.  She does find that the Prozac that was started was beneficial.  She does need refills of her medications.    She continues to have some aching joints.  She has a history of osteoarthritis.  She is curious what are the best options for this.    She had a illness in January with URI, cough, fevers that lasted 4 weeks or longer.  Her  had traveled prior to her getting ill and she is curious if they had COVID 19.      She is due for breast cancer screening.  She has a history of breast implants with rupture that are still in place.  She is due for colon cancer screening.  She is due for repeat labs.  She is up-to-date on vaccines.    History is discussed and updated on 6/22/2020 with patient.  It is current to the best of my knowledge as below.    Past Medical History:   Diagnosis Date     Allergic rhinitis 01/12/2007     Amblyopia of eye     left eye; patching and vision therapy.     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        Past Surgical History:   Procedure Laterality Date     BIOPSY BREAST      1980,1982,benign     COLONOSCOPY  04/19/2010    hemorrhoids o/w nl to TI;rep 10yrs      MAMMOPLASTY AUGMENTATION       OOPHORECTOMY      streak ovaries     OTHER SURGICAL HISTORY      LIPOSUCTION     TONSILLECTOMY           Current Outpatient Medications   Medication Sig Dispense Refill     co-enzyme Q-10 (SM COENZYME Q-10) 100 MG CAPS capsule Take 100 mg by mouth daily       Cranberry 1000 MG CAPS        fexofenadine (ALLEGRA) 180 MG tablet Take 180 mg by mouth daily       FLUoxetine (PROZAC) 10 MG capsule Take 1 capsule (10 mg) by mouth daily 90 capsule 4     latanoprost (XALATAN) 0.005 % ophthalmic solution        metoprolol succinate ER (TOPROL-XL) 25 MG 24 hr tablet Take 1 tablet (25 mg) by mouth daily 90 tablet 4     Misc Natural Products (GLUCOSAMINE CHOND COMPLEX/MSM PO) Take 1 tablet by mouth daily       montelukast (SINGULAIR) 10 MG tablet Take 1 tablet (10 mg) by mouth daily 90 tablet 4     Multiple Minerals-Vitamins (CALCIUM-MAGNESIUM-ZINC-D3) TABS Take 2 tablets by mouth daily       Multiple Vitamin (MULTI-VITAMINS) TABS Take 1 tablet by mouth daily       omeprazole (PRILOSEC) 20 MG DR capsule Take 1 capsule (20 mg) by mouth every morning (before breakfast) 90 capsule 4     probiotic CAPS Take 1 capsule by mouth daily       RA KRILL  MG CAPS Take 1 tablet by mouth daily       rosuvastatin (CRESTOR) 10 MG tablet Take 1 tablet (10 mg) by mouth daily 90 tablet 4     albuterol (PROAIR HFA/PROVENTIL HFA/VENTOLIN HFA) 108 (90 Base) MCG/ACT inhaler Inhale 2 puffs into the lungs every 4 hours as needed for shortness of breath / dyspnea 1 Inhaler 11     ALPRAZolam (XANAX) 0.5 MG tablet Take 1 tablet (0.5 mg) by mouth 2 times daily as needed for anxiety (for flying) For flying 10 tablet 0     Spacer/Aero-Holding Chambers (AEROCHAMBER MINI CHAMBER) HORACE For home use.         Allergies   Allergen Reactions     Cats      Other reaction(s): Runny Nose     Dust Mites Other (See Comments) and Itching     Sneezing, watery eyes     Ragweeds      Runny Nose; Sneezing, watery eyes        Family  History   Problem Relation Age of Onset     Thyroid Disease Mother      Heart Disease Mother      Cancer Mother         Cancer,skin     Diabetes Mother      Hyperlipidemia Mother      Hypertension Mother      Other - See Comments Mother         Stroke     Diabetes Father      Heart Disease Father      Hyperlipidemia Father      Hypertension Father      Other - See Comments Father 73        Stroke     Hyperlipidemia Brother      Myocardial Infarction Brother      Hypertension Brother      Obesity Brother      Cerebrovascular Disease Brother      Hyperlipidemia Brother      Myocardial Infarction Brother      Hypertension Brother      Obesity Brother      Hyperlipidemia Sister      Hypertension Sister      Diabetes Sister      Obesity Sister      Hyperlipidemia Sister      Diabetes Sister      Obesity Sister      Diabetes Maternal Grandmother         Diabetes     Other - See Comments Maternal Grandmother         Stroke     Diabetes Paternal Grandmother         Diabetes     Other - See Comments Paternal Grandfather         Stroke     Asthma Sister      Hyperlipidemia Sister      Hypertension Sister      Myocardial Infarction Sister      Anesthesia Reaction No family hx of         Anesthesia Problem     Blood Disease No family hx of         Blood Disease       Family Status   Relation Name Status     Mo   at age 82        heart disease, CVA, dementia, DM     Fa   at age 74        DM. CAD, CVA     Bro Quoc Alive        CABG, HTN, acoustic neuroma     Bro Baldemar Alive        stress, overweight     Sis Tea Alive        obesity, scoliosis, heart disease, DM, HTN     Sis Ana Alive        obesity, DM     MGMo  (Not Specified)     PGMo  (Not Specified)     PGFa  (Not Specified)     Sis Mitzy Alive     Son adopted Alive     Darryn adopted Alive     Neg  (Not Specified)        Social History     Tobacco Use     Smoking status: Never Smoker     Smokeless tobacco: Never Used   Substance Use Topics     Alcohol  "use: Yes     Alcohol/week: 0.0 standard drinks     Comment: 3 per week       Social History     Social History Narrative    Retired to [employer] 2014. 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  2 adopted children, Francia (Santosh); Danna (JACINTA) has 3 kids.  Both kids with special needs.             ROS  GEN:-fevers/-chills/-night sweats/+wt change 9 pound weight loss since seen in January  NEURO: +headaches/+vision changes - seeing ophthalmology  EARS: +hearing changes/+tinnitus  NOSE: -drainage/+congestion  MOUTH/THROAT: - sore throat/-dysphagia/-sores  LUNGS: -sob/-cough  CARDIOVASCULAR: -cp/-palpitations  GI: -pain/-change in bowels/-bloody stools  : -change in bladder/-vaginal discharge or bleeding  ENDOCRINE: -skin or hair changes  HEMATOLOGIC/LYMPHATIC: -swollen nodes  SKIN: -rashes/-lesions  MSK/RHEUM: +joint pain/+swelling  NEURO: -weakness/-parasthesias  PSYCH:-depression/+anxiety     EXAM:   /76 (BP Location: Right arm, Patient Position: Sitting, Cuff Size: Adult Regular)   Pulse 66   Temp 98.5  F (36.9  C) (Tympanic)   Resp 12   Ht 1.554 m (5' 1.2\")   Wt 75.7 kg (166 lb 12.8 oz)   SpO2 97%   BMI 31.31 kg/m    Estimated body mass index is 31.31 kg/m  as calculated from the following:    Height as of this encounter: 1.554 m (5' 1.2\").    Weight as of this encounter: 75.7 kg (166 lb 12.8 oz).      GEN: Vitals reviewed. Healthy appearing. Patient is in no acute distress. Cooperative with exam.  HEENT: Normocephalic atraumatic.  Eyes grossly normal to inspection.  No discharge or erythema, or obvious scleral/conjunctival abnormalities.  EACs clear bilaterally, TM gray with scarring worse on left than right  CV: Heart regular in rate and rhythm with no murmur.    LUNGS: No audible wheeze, cough, or visible cyanosis.  No visible retractions or increased work of breathing.  Lungs clear to auscultation bilaterally.    ABD:  Obese, nondistended  SKIN: Warm and dry to touch.  Visible skin clear. No " significant rash, abnormal pigmentation or lesions.  EXT: No clubbing or cyanosis.  No peripheral edema.  NEURO: Alert and oriented to person, place, and time.  Cranial nerves II-XII grossly intact with no focal or lateralizing deficits.  Muscle tone normal.  Gait normal. No tremor.   MSK: ROM of upper and lower ext symmetric and full.  PSYCH: Mood is good.  Mentation appears normal, affect normal/bright, judgement and insight intact, normal speech and appearance well-groomed.       ASSESSMENT AND PLAN:    Suspected COVID-19 virus infection  -Testing ordered, she was cautioned that it is unclear how much her insurance will charge with this.  She would like to go forward despite not knowing the price.  - COVID-19 Virus (Coronavirus) Antibody & Titer Reflex    Mixed hyperlipidemia  Stable, continue current medication.  Let me know if muscle aches worsen and we may need to consider holding.  - rosuvastatin (CRESTOR) 10 MG tablet  Dispense: 90 tablet; Refill: 4    Situational anxiety  Stable, continue current dose  - FLUoxetine (PROZAC) 10 MG capsule  Dispense: 90 capsule; Refill: 4    History of supraventricular tachycardia  -No evidence of SVT, continue metoprolol  - metoprolol succinate ER (TOPROL-XL) 25 MG 24 hr tablet  Dispense: 90 tablet; Refill: 4    Mild persistent asthma without complication  - no indication of exacerbation at this time  - patient is on inhalers and/or nebs, continue current meds  - patient encouraged to quit smoking/continue smoke free  - montelukast (SINGULAIR) 10 MG tablet  Dispense: 90 tablet; Refill: 4    Gastroesophageal reflux disease without esophagitis  -Stable, continue omeprazole  - omeprazole (PRILOSEC) 20 MG DR capsule  Dispense: 90 capsule; Refill: 4    Screening for colon cancer  - GASTROENTEROLOGY ADULT REF PROCEDURE ONLY    Breast cancer screening  - MR Breast Implant Rupture w/o Contrast    Breast implant rupture, sequela  - MR Breast Implant Rupture w/o Contrast    High risk  medication use  - Comprehensive Metabolic Panel  - CBC W PLT No Diff  - Magnesium    Primary osteoarthritis involving multiple joints  - Ice, elevation, gentle movement/rest as tolerated  - Tylenol as needed, caution with NSAIDs  - patient is to call if she has additional problems with this or if new symptoms develop         Return in about 1 year (around 6/22/2021) for Annual Review.      ULISSES PIRES,    6/22/2020 10:44 AM    This document was prepared using voice generated softwear. While every attempt was made for accuracy, spelling and grammatical errors may exist.

## 2020-06-22 NOTE — NURSING NOTE
Patient presents to clinic today for physical exam. She is fasting but had a bunch of labs done in January this year, so may not need labs today?  Medication reconciliation completed.  Magali Schultz CMA([facility])..................6/22/2020   10:29 AM

## 2020-06-23 ASSESSMENT — ASTHMA QUESTIONNAIRES: ACT_TOTALSCORE: 25

## 2020-06-25 LAB
COVID-19 SPIKE RBD ABY TITER: NORMAL
COVID-19 SPIKE RBD ABY: NEGATIVE

## 2020-07-22 ENCOUNTER — VIRTUAL VISIT (OUTPATIENT)
Dept: UROLOGY | Facility: OTHER | Age: 60
End: 2020-07-22
Attending: UROLOGY
Payer: COMMERCIAL

## 2020-07-22 VITALS — BODY MASS INDEX: 31.16 KG/M2 | WEIGHT: 166 LBS

## 2020-07-22 DIAGNOSIS — N39.0 FREQUENT UTI: Primary | ICD-10-CM

## 2020-07-22 PROCEDURE — 99212 OFFICE O/P EST SF 10 MIN: CPT | Mod: 95 | Performed by: UROLOGY

## 2020-07-22 RX ORDER — NITROFURANTOIN 25; 75 MG/1; MG/1
100 CAPSULE ORAL 2 TIMES DAILY
Qty: 30 CAPSULE | Refills: 0 | Status: SHIPPED | OUTPATIENT
Start: 2020-07-22 | End: 2021-02-23

## 2020-07-22 ASSESSMENT — PAIN SCALES - GENERAL: PAINLEVEL: NO PAIN (0)

## 2020-07-22 NOTE — PROGRESS NOTES
"Nola Lezama is a 60 year old female who is being evaluated via a billable telephone visit.      The patient has been notified of following:     \"This telephone visit will be conducted via a call between you and your physician/provider. We have found that certain health care needs can be provided without the need for a physical exam.  This service lets us provide the care you need with a short phone conversation.  If a prescription is necessary we can send it directly to your pharmacy.  If lab work is needed we can place an order for that and you can then stop by our lab to have the test done at a later time.    If during the course of the call the physician/provider feels a telephone visit is not appropriate, you will not be charged for this service.\"     Patient has given verbal consent for Telephone visit?  Yes    Nola Lezama complains of    Chief Complaint   Patient presents with     Follow Up     medications     I have reviewed and updated the patient's Past Medical History, Social History, Family History and Medication List.    ALLERGIES  Cats; Dust mites; and Ragweeds    Notes and Assessment    HPI  Ms. Lezama is a 60 year old female with frequent UTIs.  A few years ago she started on demand therapy with Bactrim.  She has taken a 3-day course of Bactrim 2 times in the last year.  The last course she took she did stop in to a local clinic and was switched to a different antibiotic.  She thinks there may have been issues with resistance.  She denies symptoms today.  She is interested in a refill.    Labs  Results for NOLA LEZAMA (MRN [id number]) as of 3/26/2018 13:24   3/26/2018 13:03   Color Urine Yellow   Appearance Urine Clear   Glucose Urine Negative   Bilirubin Urine Negative   Ketones Urine Negative   Specific Gravity Urine 1.025   pH Urine 6.0   Protein Albumin Urine Negative   Urobilinogen Urine 0.2   Nitrite Urine Negative   Blood Urine Small (A)   Leukocyte Esterase Urine Negative "     Post-Void Residual  A post-void residual was measured by ultrasonic bladder scanner.  0 mL (previously recorded)    Assessment  Ms. Lezama is a 60 year old female with frequent UTIs.  She continues doing well with on demand therapy.  We are switching from Bactrim to Macrobid in order to minimize risk for resistance.    Plan  Macrobid x 5 days on demand  Follow up annually          Jim Clarke MD      Phone call duration: 11 minutes

## 2020-08-21 ENCOUNTER — E-VISIT (OUTPATIENT)
Dept: INTERNAL MEDICINE | Facility: OTHER | Age: 60
End: 2020-08-21
Payer: COMMERCIAL

## 2020-08-21 ENCOUNTER — TELEPHONE (OUTPATIENT)
Dept: INTERNAL MEDICINE | Facility: OTHER | Age: 60
End: 2020-08-21

## 2020-08-21 DIAGNOSIS — J45.30 MILD PERSISTENT ASTHMA WITHOUT COMPLICATION: Primary | ICD-10-CM

## 2020-08-21 PROCEDURE — 99421 OL DIG E/M SVC 5-10 MIN: CPT | Performed by: INTERNAL MEDICINE

## 2020-08-21 RX ORDER — PREDNISONE 10 MG/1
TABLET ORAL
Qty: 18 TABLET | Refills: 0 | Status: SHIPPED | OUTPATIENT
Start: 2020-08-21 | End: 2020-08-30

## 2020-08-21 NOTE — TELEPHONE ENCOUNTER
Pt. Feels like she has Bronchitis and would like a phone call in regards to getting some prednisone

## 2020-08-21 NOTE — TELEPHONE ENCOUNTER
Patient states she get bronchitis frequently. Patient has no fever but a productive cough. Patient agreed to a virtual visit with another provider due to Noris Medellin not being available until Monday. Patient was transferred to the appointment line.  Herlinda Eduardo LPN on 8/21/2020 at 10:52 AM

## 2020-12-20 ENCOUNTER — HEALTH MAINTENANCE LETTER (OUTPATIENT)
Age: 60
End: 2020-12-20

## 2021-02-23 ENCOUNTER — OFFICE VISIT (OUTPATIENT)
Dept: INTERNAL MEDICINE | Facility: OTHER | Age: 61
End: 2021-02-23
Attending: INTERNAL MEDICINE
Payer: COMMERCIAL

## 2021-02-23 VITALS
OXYGEN SATURATION: 95 % | RESPIRATION RATE: 18 BRPM | HEART RATE: 88 BPM | SYSTOLIC BLOOD PRESSURE: 132 MMHG | TEMPERATURE: 98 F | DIASTOLIC BLOOD PRESSURE: 84 MMHG

## 2021-02-23 DIAGNOSIS — R19.7 DIARRHEA, UNSPECIFIED TYPE: Primary | ICD-10-CM

## 2021-02-23 PROCEDURE — 99213 OFFICE O/P EST LOW 20 MIN: CPT | Performed by: INTERNAL MEDICINE

## 2021-02-23 PROCEDURE — 87635 SARS-COV-2 COVID-19 AMP PRB: CPT | Mod: ZL | Performed by: INTERNAL MEDICINE

## 2021-02-23 PROCEDURE — C9803 HOPD COVID-19 SPEC COLLECT: HCPCS

## 2021-02-23 ASSESSMENT — PAIN SCALES - GENERAL: PAINLEVEL: MILD PAIN (2)

## 2021-02-23 ASSESSMENT — PATIENT HEALTH QUESTIONNAIRE - PHQ9: SUM OF ALL RESPONSES TO PHQ QUESTIONS 1-9: 4

## 2021-02-23 NOTE — NURSING NOTE
Patient presents to clinic today for diarrhea for the past week or so. For about 5 days she had explosive diarrhea which has gotten better but she did actually have some vomiting on Friday evening.   Medication reconciliation completed.  Magali Schultz CMA([facility])..................2/23/2021   4:08 PM

## 2021-02-23 NOTE — PROGRESS NOTES
"Assessment & Plan       ICD-10-CM    1. Diarrhea, unspecified type  R19.7 Clostridium difficile toxin B PCR     Symptomatic COVID-19 Virus (Coronavirus) by PCR     Given PPI use, Nikki should be tested for C. Diff. Patient given materials for and educated on how to acquire stool sample. Since she has several of symptoms associated with COVID-19, she was tested for COVID-19. Results pending.     If C. difficile and COVID-19 test are both negative likely represents gastroenteritis.    She was educated on diarrhea treatment including drinking plenty of water and continuing to eat a band diet with food reintroduction as tolerated. Due to her expected vacation, she was advised to use an OTC anti-diarrheal such as imodium to make travel more comfortable assuming her C. difficile test is negative.    Instructed to follow up with any questions or concerns or if new or worsening symptoms.     Subjective     Nikki Lezama is a 60 year old female who presents to clinic today for the following health issues accompanied by her:    NATIVIDAD Jordan presents with 1 week of diarrhea associated with abdominal distension and obstipation. For the first 2-3 days, she was having 12 watery bowel movements per day. Denied blood in the stools or black tarry stools. Friday evening (2/19/21) she went out to eat with her  and a few friends and immediately had diarrhea and vomiting after coming home. She also had slight chills. She denies nausea or vomiting since then. Saturday morning (2/20/21) she began having soft stools that were \"thin and worm shaped\". She feels as if she is not completely emptying her bowels and is still having 6-7 bowel movements per day. She has been very fatigued, but has been hydrating well with water and sprite. Denies recent antibiotic use or hospitalization, but she is concerned about C.diff. She is on Prilosec for GERD. Denies any GERD symptoms, but endorses a generalized stomach discomfort.     She is going " to vacation in the [place] March 1st and would like the diarrhea to be controlled by then.    Denies chest pain, dyspnea, fever, sore throat, cough, and any known COVID-19 exposures.    Review of Systems   Denies any fevers, SOB, chest pain, increased lower extremity edema, changes in bladder or other concerns.         Objective    /84 (BP Location: Right arm, Patient Position: Sitting, Cuff Size: Adult Large)   Pulse 88   Temp 98  F (36.7  C) (Oral)   Resp 18   SpO2 95%   There is no height or weight on file to calculate BMI.  Physical Exam   GENERAL: healthy, alert and no distress  EYES: Eyes grossly normal to inspection, PERRL and conjunctivae and sclerae normal  RESP: lungs clear to auscultation - no rales, rhonchi or wheezes  CV: regular rate and rhythm, normal S1 S2, no S3 or S4, no murmur, click or rub, no peripheral edema and peripheral pulses strong  ABDOMEN: soft, mildly distended and slightly tender to palpation, no masses and bowel sounds normal  MS: no gross musculoskeletal defects noted, no edema  NEURO: Normal strength and tone, mentation intact and speech normal    I was present with student Francia Muhammad, who participated in the service and documentation of this note.  I have verified the history and personally performed the physical exam, medical decision making and have verified the content of this note, which accurately reflects my assessment of the patient and the plan of care.    Noris Medellin, DO

## 2021-02-23 NOTE — PATIENT INSTRUCTIONS
Patient Education     Treating Diarrhea    Diarrhea happens when you have loose, watery, or frequent bowel movements. It is a common problem with many causes. Most cases of diarrhea clear up on their own. But certain cases may need treatment. Be sure to see your healthcare provider if your symptoms don't get better in a few days.   Getting relief  Treatment of diarrhea depends on its cause. Diarrhea caused by bacterial or parasite infection is often treated with antibiotics. Diarrhea caused by other factors, such as a stomach virus, often improves with simple home treatment. The tips below may also help ease your symptoms.     Drink plenty of fluids. This helps prevent too much fluid loss (dehydration). Water, clear soups, and electrolyte solutions are good choices. Don't take alcohol, coffee, tea, or milk. These can irritate your intestines and make symptoms worse.    Suck on ice chips if drinking makes you queasy.    Return to your normal diet slowly. You may want to eat bland foods at first, such as rice and toast. Also, you may need to stay away from certain foods for a while, such as dairy products. These can make symptoms worse. Ask your healthcare provider if there are any other foods you should stay away from.    If you were prescribed antibiotics, take them as directed.    Don't take anti-diarrhea medicines without asking your provider first.    Call your healthcare provider   Call your healthcare provider if you have any of the following:     A fever of 100.4  F ( 38.0 C) or higher, or as directed by your provider    Chills    Severe pain    Worsening diarrhea or diarrhea for more than 2 days    Bloody vomit or stool    Signs of dehydration (dizziness, dry mouth and tongue, rapid pulse, dark urine)  manetch last reviewed this educational content on 6/1/2019 2000-2020 [employer]. [address] [place] [address], [place] [address]. All rights reserved. This information is not intended as a  substitute for professional medical care. Always follow your healthcare professional's instructions.

## 2021-02-24 LAB
C DIFF TOX B STL QL: NORMAL
LABORATORY COMMENT REPORT: NORMAL
SARS-COV-2 RNA RESP QL NAA+PROBE: NEGATIVE
SARS-COV-2 RNA RESP QL NAA+PROBE: NORMAL
SPECIMEN SOURCE: NORMAL

## 2021-02-24 ASSESSMENT — ASTHMA QUESTIONNAIRES: ACT_TOTALSCORE: 24

## 2021-03-24 ENCOUNTER — IMMUNIZATION (OUTPATIENT)
Dept: FAMILY MEDICINE | Facility: OTHER | Age: 61
End: 2021-03-24
Attending: FAMILY MEDICINE
Payer: COMMERCIAL

## 2021-03-24 PROCEDURE — 91300 PR COVID VAC PFIZER DIL RECON 30 MCG/0.3 ML IM: CPT

## 2021-03-24 PROCEDURE — 0001A PR COVID VAC PFIZER DIL RECON 30 MCG/0.3 ML IM: CPT

## 2021-04-14 ENCOUNTER — APPOINTMENT (OUTPATIENT)
Dept: CT IMAGING | Facility: HOSPITAL | Age: 61
End: 2021-04-14
Attending: STUDENT IN AN ORGANIZED HEALTH CARE EDUCATION/TRAINING PROGRAM
Payer: COMMERCIAL

## 2021-04-14 ENCOUNTER — HOSPITAL ENCOUNTER (EMERGENCY)
Facility: OTHER | Age: 61
Discharge: ANOTHER HOSPITAL | End: 2021-04-14
Attending: EMERGENCY MEDICINE | Admitting: EMERGENCY MEDICINE
Payer: COMMERCIAL

## 2021-04-14 ENCOUNTER — HOSPITAL ENCOUNTER (EMERGENCY)
Facility: HOSPITAL | Age: 61
Discharge: HOME | End: 2021-04-14
Attending: STUDENT IN AN ORGANIZED HEALTH CARE EDUCATION/TRAINING PROGRAM | Admitting: STUDENT IN AN ORGANIZED HEALTH CARE EDUCATION/TRAINING PROGRAM
Payer: COMMERCIAL

## 2021-04-14 VITALS
RESPIRATION RATE: 11 BRPM | HEART RATE: 82 BPM | OXYGEN SATURATION: 95 % | DIASTOLIC BLOOD PRESSURE: 64 MMHG | SYSTOLIC BLOOD PRESSURE: 110 MMHG | TEMPERATURE: 98 F

## 2021-04-14 VITALS
WEIGHT: 180.34 LBS | RESPIRATION RATE: 18 BRPM | HEART RATE: 84 BPM | TEMPERATURE: 100.9 F | OXYGEN SATURATION: 95 % | DIASTOLIC BLOOD PRESSURE: 77 MMHG | SYSTOLIC BLOOD PRESSURE: 130 MMHG | BODY MASS INDEX: 33.85 KG/M2

## 2021-04-14 DIAGNOSIS — K57.32 DIVERTICULITIS OF COLON: ICD-10-CM

## 2021-04-14 DIAGNOSIS — R10.829 REBOUND ABDOMINAL TENDERNESS: ICD-10-CM

## 2021-04-14 DIAGNOSIS — R10.32 LEFT LOWER QUADRANT ABDOMINAL PAIN: ICD-10-CM

## 2021-04-14 DIAGNOSIS — R10.32 LLQ ABDOMINAL PAIN: ICD-10-CM

## 2021-04-14 PROBLEM — M47.816 LUMBAR SPONDYLOSIS: Status: ACTIVE | Noted: 2019-10-28

## 2021-04-14 LAB
ALBUMIN SERPL-MCNC: 4.3 G/DL (ref 3.5–5.7)
ALBUMIN UR-MCNC: NEGATIVE MG/DL
ALP SERPL-CCNC: 60 U/L (ref 34–104)
ALT SERPL W P-5'-P-CCNC: 34 U/L (ref 7–52)
ANION GAP SERPL CALCULATED.3IONS-SCNC: 12 MMOL/L (ref 3–14)
APPEARANCE UR: CLEAR
AST SERPL W P-5'-P-CCNC: 23 U/L (ref 13–39)
BACTERIA #/AREA URNS HPF: ABNORMAL /HPF
BASOPHILS # BLD AUTO: 0 10E9/L (ref 0–0.2)
BASOPHILS NFR BLD AUTO: 0.3 %
BILIRUB SERPL-MCNC: 0.6 MG/DL (ref 0.3–1)
BILIRUB UR QL STRIP: NEGATIVE
BUN SERPL-MCNC: 11 MG/DL (ref 7–25)
CALCIUM SERPL-MCNC: 9.3 MG/DL (ref 8.6–10.3)
CHLORIDE SERPL-SCNC: 102 MMOL/L (ref 98–107)
CO2 SERPL-SCNC: 23 MMOL/L (ref 21–31)
COLOR UR AUTO: YELLOW
CREAT SERPL-MCNC: 0.68 MG/DL (ref 0.6–1.2)
DIFFERENTIAL METHOD BLD: ABNORMAL
EOSINOPHIL # BLD AUTO: 0 10E9/L (ref 0–0.7)
EOSINOPHIL NFR BLD AUTO: 0.1 %
ERYTHROCYTE [DISTWIDTH] IN BLOOD BY AUTOMATED COUNT: 14.1 % (ref 10–15)
GFR SERPL CREATININE-BSD FRML MDRD: 87 ML/MIN/{1.73_M2}
GLUCOSE SERPL-MCNC: 122 MG/DL (ref 70–105)
GLUCOSE UR STRIP-MCNC: NEGATIVE MG/DL
HCT VFR BLD AUTO: 37.6 % (ref 35–47)
HGB BLD-MCNC: 12.8 G/DL (ref 11.7–15.7)
HGB UR QL STRIP: ABNORMAL
IMM GRANULOCYTES # BLD: 0 10E9/L (ref 0–0.4)
IMM GRANULOCYTES NFR BLD: 0.3 %
KETONES UR STRIP-MCNC: NEGATIVE MG/DL
LABORATORY COMMENT REPORT: NORMAL
LACTATE BLD-SCNC: 1.4 MMOL/L (ref 0.7–2)
LEUKOCYTE ESTERASE UR QL STRIP: NEGATIVE
LYMPHOCYTES # BLD AUTO: 1.4 10E9/L (ref 0.8–5.3)
LYMPHOCYTES NFR BLD AUTO: 12 %
MCH RBC QN AUTO: 30.3 PG (ref 26.5–33)
MCHC RBC AUTO-ENTMCNC: 34 G/DL (ref 31.5–36.5)
MCV RBC AUTO: 89 FL (ref 78–100)
MONOCYTES # BLD AUTO: 1 10E9/L (ref 0–1.3)
MONOCYTES NFR BLD AUTO: 8.3 %
MUCOUS THREADS #/AREA URNS LPF: PRESENT /LPF
NEUTROPHILS # BLD AUTO: 9.2 10E9/L (ref 1.6–8.3)
NEUTROPHILS NFR BLD AUTO: 79 %
NITRATE UR QL: NEGATIVE
PH UR STRIP: 8.5 PH (ref 5–9)
PLATELET # BLD AUTO: 257 10E9/L (ref 150–450)
POTASSIUM SERPL-SCNC: 3.5 MMOL/L (ref 3.5–5.1)
PROT SERPL-MCNC: 7.2 G/DL (ref 6.4–8.9)
RBC # BLD AUTO: 4.22 10E12/L (ref 3.8–5.2)
RBC #/AREA URNS AUTO: ABNORMAL /HPF
SARS-COV-2 RNA RESP QL NAA+PROBE: NEGATIVE
SODIUM SERPL-SCNC: 137 MMOL/L (ref 134–144)
SOURCE: ABNORMAL
SP GR UR STRIP: 1.01 (ref 1–1.03)
SPECIMEN SOURCE: NORMAL
UROBILINOGEN UR STRIP-ACNC: 0.2 EU/DL (ref 0.2–1)
WBC # BLD AUTO: 11.7 10E9/L (ref 4–11)
WBC #/AREA URNS AUTO: ABNORMAL /HPF

## 2021-04-14 PROCEDURE — 96375 TX/PRO/DX INJ NEW DRUG ADDON: CPT | Performed by: EMERGENCY MEDICINE

## 2021-04-14 PROCEDURE — 36415 COLL VENOUS BLD VENIPUNCTURE: CPT | Performed by: EMERGENCY MEDICINE

## 2021-04-14 PROCEDURE — 80053 COMPREHEN METABOLIC PANEL: CPT | Performed by: EMERGENCY MEDICINE

## 2021-04-14 PROCEDURE — 250N000011 HC RX IP 250 OP 636: Performed by: EMERGENCY MEDICINE

## 2021-04-14 PROCEDURE — 99283 EMERGENCY DEPT VISIT LOW MDM: CPT | Performed by: STUDENT IN AN ORGANIZED HEALTH CARE EDUCATION/TRAINING PROGRAM

## 2021-04-14 PROCEDURE — C9803 HOPD COVID-19 SPEC COLLECT: HCPCS | Performed by: EMERGENCY MEDICINE

## 2021-04-14 PROCEDURE — 85025 COMPLETE CBC W/AUTO DIFF WBC: CPT | Performed by: EMERGENCY MEDICINE

## 2021-04-14 PROCEDURE — 99284 EMERGENCY DEPT VISIT MOD MDM: CPT | Mod: 25

## 2021-04-14 PROCEDURE — U0005 INFEC AGEN DETEC AMPLI PROBE: HCPCS | Performed by: EMERGENCY MEDICINE

## 2021-04-14 PROCEDURE — 255N000002 HC RX 255 OP 636: Performed by: RADIOLOGY

## 2021-04-14 PROCEDURE — 96365 THER/PROPH/DIAG IV INF INIT: CPT | Performed by: EMERGENCY MEDICINE

## 2021-04-14 PROCEDURE — 81001 URINALYSIS AUTO W/SCOPE: CPT | Performed by: EMERGENCY MEDICINE

## 2021-04-14 PROCEDURE — 74177 CT ABD & PELVIS W/CONTRAST: CPT

## 2021-04-14 PROCEDURE — 87040 BLOOD CULTURE FOR BACTERIA: CPT | Performed by: EMERGENCY MEDICINE

## 2021-04-14 PROCEDURE — 83605 ASSAY OF LACTIC ACID: CPT | Performed by: EMERGENCY MEDICINE

## 2021-04-14 PROCEDURE — 258N000003 HC RX IP 258 OP 636: Performed by: EMERGENCY MEDICINE

## 2021-04-14 PROCEDURE — U0003 INFECTIOUS AGENT DETECTION BY NUCLEIC ACID (DNA OR RNA); SEVERE ACUTE RESPIRATORY SYNDROME CORONAVIRUS 2 (SARS-COV-2) (CORONAVIRUS DISEASE [COVID-19]), AMPLIFIED PROBE TECHNIQUE, MAKING USE OF HIGH THROUGHPUT TECHNOLOGIES AS DESCRIBED BY CMS-2020-01-R: HCPCS | Performed by: EMERGENCY MEDICINE

## 2021-04-14 PROCEDURE — 99284 EMERGENCY DEPT VISIT MOD MDM: CPT | Performed by: EMERGENCY MEDICINE

## 2021-04-14 PROCEDURE — 99285 EMERGENCY DEPT VISIT HI MDM: CPT | Mod: 25 | Performed by: EMERGENCY MEDICINE

## 2021-04-14 RX ORDER — KETOROLAC TROMETHAMINE 30 MG/ML
30 INJECTION, SOLUTION INTRAMUSCULAR; INTRAVENOUS
Status: COMPLETED | OUTPATIENT
Start: 2021-04-14 | End: 2021-04-14

## 2021-04-14 RX ORDER — IOPAMIDOL 612 MG/ML
100 INJECTION, SOLUTION INTRAVASCULAR ONCE
Status: COMPLETED | OUTPATIENT
Start: 2021-04-14 | End: 2021-04-14

## 2021-04-14 RX ORDER — METRONIDAZOLE 500 MG/1
500 TABLET ORAL 3 TIMES DAILY
Qty: 30 TABLET | Refills: 0 | Status: SHIPPED | OUTPATIENT
Start: 2021-04-14 | End: 2021-04-24

## 2021-04-14 RX ORDER — CIPROFLOXACIN 500 MG/1
500 TABLET, FILM COATED ORAL 2 TIMES DAILY
Qty: 20 TABLET | Refills: 0 | Status: SHIPPED | OUTPATIENT
Start: 2021-04-14 | End: 2021-04-24

## 2021-04-14 RX ORDER — HYDROMORPHONE HYDROCHLORIDE 1 MG/ML
0.3 INJECTION, SOLUTION INTRAMUSCULAR; INTRAVENOUS; SUBCUTANEOUS ONCE
Status: COMPLETED | OUTPATIENT
Start: 2021-04-14 | End: 2021-04-14

## 2021-04-14 RX ORDER — ONDANSETRON 2 MG/ML
4 INJECTION INTRAMUSCULAR; INTRAVENOUS EVERY 30 MIN PRN
Status: DISCONTINUED | OUTPATIENT
Start: 2021-04-14 | End: 2021-04-14 | Stop reason: HOSPADM

## 2021-04-14 RX ORDER — SODIUM CHLORIDE 9 MG/ML
INJECTION, SOLUTION INTRAVENOUS CONTINUOUS
Status: DISCONTINUED | OUTPATIENT
Start: 2021-04-14 | End: 2021-04-14 | Stop reason: HOSPADM

## 2021-04-14 RX ORDER — ONDANSETRON 4 MG/1
4 TABLET, FILM COATED ORAL EVERY 8 HOURS PRN
Qty: 6 TABLET | Refills: 0 | Status: SHIPPED | OUTPATIENT
Start: 2021-04-14 | End: 2021-04-24

## 2021-04-14 RX ADMIN — TAZOBACTAM SODIUM AND PIPERACILLIN SODIUM 4.5 G: 500; 4 INJECTION, SOLUTION INTRAVENOUS at 07:44

## 2021-04-14 RX ADMIN — IOPAMIDOL 100 ML: 612 INJECTION, SOLUTION INTRAVENOUS at 09:58

## 2021-04-14 RX ADMIN — HYDROMORPHONE HYDROCHLORIDE 0.3 MG: 1 INJECTION, SOLUTION INTRAMUSCULAR; INTRAVENOUS; SUBCUTANEOUS at 08:28

## 2021-04-14 RX ADMIN — ONDANSETRON 4 MG: 2 INJECTION INTRAMUSCULAR; INTRAVENOUS at 07:27

## 2021-04-14 RX ADMIN — SODIUM CHLORIDE: 9 INJECTION, SOLUTION INTRAVENOUS at 07:25

## 2021-04-14 RX ADMIN — KETOROLAC TROMETHAMINE 30 MG: 30 INJECTION, SOLUTION INTRAMUSCULAR at 07:28

## 2021-04-14 ASSESSMENT — ENCOUNTER SYMPTOMS
SORE THROAT: 0
NAUSEA: 0
DYSURIA: 0
RHINORRHEA: 0
ABDOMINAL PAIN: 1
WHEEZING: 0
WOUND: 0
ABDOMINAL PAIN: 1
CHILLS: 0
NECK PAIN: 0
LIGHT-HEADEDNESS: 0
DIARRHEA: 0
EYE PAIN: 0
VOMITING: 0
DIZZINESS: 0
ARTHRALGIAS: 0
VOMITING: 0
FACIAL SWELLING: 0
NAUSEA: 1
CHILLS: 0
CHEST TIGHTNESS: 0
DYSURIA: 0
FEVER: 0
BACK PAIN: 0
CONSTIPATION: 0
FLANK PAIN: 0
SHORTNESS OF BREATH: 0
SHORTNESS OF BREATH: 0
FEVER: 0
WEAKNESS: 0
EYE REDNESS: 0
HEADACHES: 0
EYE DISCHARGE: 0
AGITATION: 0
HEMATURIA: 0
NUMBNESS: 0

## 2021-04-14 NOTE — ED PROVIDER NOTES
History     Chief Complaint   Patient presents with     Abdominal Pain     worked up in [facility]. needs CT.     HPI  Nikki Lezama is a 61 year old female who was transferred here from [place] over concern of a perforated diverticula, patient has history of diverticula, work-up there was a consistent with an acute infectious process, blood work was obtained, IV antibiotics were given, patient was given Zosyn, as well as hydromorphone for symptom relief, patient was transported without incident via EMS.  Her primary concern on presentation is left lower quadrant pain.    Allergies:  Allergies   Allergen Reactions     Cats      Other reaction(s): Runny Nose     Dust Mites Other (See Comments) and Itching     Sneezing, watery eyes     Ragweeds      Runny Nose; Sneezing, watery eyes       Problem List:    Patient Active Problem List    Diagnosis Date Noted     Lumbar spondylosis 10/28/2019     Priority: Medium     Fatty liver disease, nonalcoholic 05/03/2016     Priority: Medium     MODESTO (generalized anxiety disorder) 06/02/2014     Priority: Medium     Hyperlipidemia 06/02/2014     Priority: Medium     Sensorineural hearing loss, asymmetrical 10/02/2008     Priority: Medium     Esophageal reflux 02/29/2008     Priority: Medium     Mechanical complication due to breast prosthesis 02/29/2008     Priority: Medium     Overview:   ruptured implants       Mild persistent asthma without complication 02/29/2008     Priority: Medium     History of supraventricular tachycardia 01/12/2007     Priority: Medium     Overview:   SVT       Perennial allergic rhinitis 01/12/2007     Priority: Medium        Past Medical History:    Past Medical History:   Diagnosis Date     Allergic rhinitis 01/12/2007     Amblyopia of eye      Dysplasia of cervix uteri 01/12/2007     Female infertility      Gastro-esophageal reflux disease without esophagitis 02/29/2008     Glaucoma      Lateral epicondylitis of elbow 08/08/2014     Mild persistent  asthma, uncomplicated 02/29/2008     Other mechanical complication of breast prosthesis and implant, initial encounter 02/29/2008     SVT (supraventricular tachycardia) (H) 01/12/2007     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       Past Surgical History:    Past Surgical History:   Procedure Laterality Date     BIOPSY BREAST      1980,1982,benign     COLONOSCOPY  04/19/2010    hemorrhoids o/w nl to TI;rep 10yrs     MAMMOPLASTY AUGMENTATION       OOPHORECTOMY      streak ovaries     OTHER SURGICAL HISTORY      LIPOSUCTION     TONSILLECTOMY         Family History:    Family History   Problem Relation Age of Onset     Thyroid Disease Mother      Heart Disease Mother      Cancer Mother         Cancer,skin     Diabetes Mother      Hyperlipidemia Mother      Hypertension Mother      Other - See Comments Mother         Stroke     Diabetes Father      Heart Disease Father      Hyperlipidemia Father      Hypertension Father      Other - See Comments Father 73        Stroke     Hyperlipidemia Brother      Myocardial Infarction Brother      Hypertension Brother      Obesity Brother      Cerebrovascular Disease Brother      Hyperlipidemia Brother      Myocardial Infarction Brother         x3     Hypertension Brother      Obesity Brother      Hyperlipidemia Sister      Hypertension Sister      Diabetes Sister      Obesity Sister      Other - See Comments Sister         COVID 19     Hyperlipidemia Sister      Diabetes Sister      Obesity Sister      Diabetes Maternal Grandmother         Diabetes     Other - See Comments Maternal Grandmother         Stroke     Diabetes Paternal Grandmother         Diabetes     Other - See Comments Paternal Grandfather         Stroke     Asthma Sister      Hyperlipidemia Sister      Hypertension Sister      Myocardial Infarction Sister      Anesthesia Reaction No family hx of         Anesthesia Problem     Blood Disease No family hx of         Blood Disease       Social History:  Marital Status:    [2]  Social History     Tobacco Use     Smoking status: Never Smoker     Smokeless tobacco: Never Used   Substance Use Topics     Alcohol use: Yes     Alcohol/week: 0.0 standard drinks     Comment: 3 per week     Drug use: No        Medications:    albuterol (PROAIR HFA/PROVENTIL HFA/VENTOLIN HFA) 108 (90 Base) MCG/ACT inhaler  ciprofloxacin (CIPRO) 500 MG tablet  co-enzyme Q-10 (SM COENZYME Q-10) 100 MG CAPS capsule  Cranberry 1000 MG CAPS  fexofenadine (ALLEGRA) 180 MG tablet  FLUoxetine (PROZAC) 10 MG capsule  latanoprost (XALATAN) 0.005 % ophthalmic solution  metoprolol succinate ER (TOPROL-XL) 25 MG 24 hr tablet  metroNIDAZOLE (FLAGYL) 500 MG tablet  Misc Natural Products (GLUCOSAMINE CHOND COMPLEX/MSM PO)  montelukast (SINGULAIR) 10 MG tablet  Multiple Minerals-Vitamins (CALCIUM-MAGNESIUM-ZINC-D3) TABS  Multiple Vitamin (MULTI-VITAMINS) TABS  omeprazole (PRILOSEC) 20 MG DR capsule  ondansetron (ZOFRAN) 4 MG tablet  probiotic CAPS  RA KRILL  MG CAPS  rosuvastatin (CRESTOR) 10 MG tablet  Spacer/Aero-Holding Chambers (AEROCHAMBER MINI CHAMBER) HORACE  ALPRAZolam (XANAX) 0.5 MG tablet          Review of Systems   Constitutional: Negative for chills and fever.   HENT: Negative for ear pain, facial swelling, rhinorrhea and sore throat.    Eyes: Negative for pain, discharge and redness.   Respiratory: Negative for shortness of breath and wheezing.    Cardiovascular: Negative for chest pain.   Gastrointestinal: Positive for abdominal pain. Negative for constipation, diarrhea, nausea and vomiting.   Genitourinary: Negative for dysuria, flank pain, hematuria, vaginal bleeding and vaginal discharge.   Musculoskeletal: Negative for back pain and neck pain.   Skin: Negative for rash and wound.   Neurological: Negative for dizziness, weakness, numbness and headaches.       Physical Exam   BP: 119/76  Pulse: 82  Temp: 98.6  F (37  C)  Resp: 17  SpO2: 95 %      Physical Exam  Constitutional:       General: She is not in  acute distress.     Appearance: Normal appearance. She is not diaphoretic.   HENT:      Head: Normocephalic and atraumatic.      Nose: Nose normal.      Mouth/Throat:      Mouth: Mucous membranes are moist.      Pharynx: Oropharynx is clear. No oropharyngeal exudate.   Eyes:      General: Lids are normal. No scleral icterus.     Extraocular Movements: Extraocular movements intact.      Conjunctiva/sclera: Conjunctivae normal.      Pupils: Pupils are equal, round, and reactive to light.   Neck:      Musculoskeletal: Full passive range of motion without pain, normal range of motion and neck supple.   Cardiovascular:      Rate and Rhythm: Normal rate and regular rhythm.      Pulses: Normal pulses.      Heart sounds: Normal heart sounds. No murmur. No friction rub. No gallop.    Pulmonary:      Effort: Pulmonary effort is normal. No respiratory distress.      Breath sounds: Normal breath sounds. No stridor. No wheezing, rhonchi or rales.   Abdominal:      General: Bowel sounds are normal.      Palpations: Abdomen is soft.      Tenderness: There is abdominal tenderness in the left lower quadrant. There is guarding. There is no rebound. Negative signs include Wong's sign and McBurney's sign.   Musculoskeletal: Normal range of motion.         General: No tenderness.   Skin:     General: Skin is warm and dry.      Capillary Refill: Capillary refill takes less than 2 seconds.      Findings: No rash.   Neurological:      General: No focal deficit present.      Mental Status: She is oriented to person, place, and time. Mental status is at baseline.      GCS: GCS eye subscore is 4. GCS verbal subscore is 5. GCS motor subscore is 6.   Psychiatric:         Attention and Perception: Attention normal.         Mood and Affect: Mood normal.         Speech: Speech normal.         Behavior: Behavior normal.         ED Course        Procedures               Results for orders placed or performed during the hospital encounter of  04/14/21 (from the past 24 hour(s))   CT Abdomen Pelvis w Contrast    Narrative    EXAMINATION: CT ABDOMEN PELVIS W CONTRAST, 4/14/2021 10:05 AM    TECHNIQUE:  Helical CT images from the lung bases through the  symphysis pubis were obtained  with IV contrast. Contrast dose: ISOVUE  300  100ML    COMPARISON: none    HISTORY: Abdominal abscess/infection suspected. Abdominal pain    FINDINGS:    There is dependent atelectasis at the lung bases.    There is a probable hemangioma in the right lobe of the liver stable  from 2015. No calcified gallstones are noted. There is no biliary  ductal enlargement.    The the spleen and pancreas appear normal.    The adrenal glands are normal.    There are multiple benign cysts seen in both kidneys. There is no  hydronephrosis.    The periaortic lymph nodes are normal in caliber.    There are inflammatory changes seen adjacent to the sigmoid colon  consistent with diverticulitis. No diverticular abscess is noted.  There is a small amount of free fluid seen in the pelvis.    In the pelvis the bladder and rectum appear normal.    Degenerative changes are present in the thoracic and lumbar spine.      Impression    IMPRESSION: Sigmoid diverticulitis. No abscess is seen.     VICKI JAMISON MD       Medications   sodium chloride (PF) 0.9% PF flush 60 mL (60 mLs Intravenous Given 4/14/21 0958)   iopamidol (ISOVUE-300) IV solution 61% 100 mL (100 mLs Intravenous Given 4/14/21 0958)       Assessments & Plan (with Medical Decision Making)     I have reviewed the nursing notes.    I have reviewed the findings, diagnosis, plan and need for follow up with the patient.    This is a 61 year old lady who presents to the ED for left lower abdominal pain. Given her history and examination my suspicion is that this pain is secondary to diverticulitis, concern for possible perforation or abscess. Additional considerations include diverticulitis, colitis, intra-abdominal abscess, bowel obstruction,  ileitis, volvulus, inguinal hernia, ovarian torsion, tubo-ovarian abscess, ovarian cyst.   Evaluation and management will include labs as well as symptom management. Disposition pending results and patient course.   I deferred on labs as these were obtained in [place], patient had normal renal function, labs consistent with acute infectious process.  Abdominal CT was obtained, abdominal CT was down in [place] which was the reason for her transfer, CT findings are consistent with a diverticulitis, no concern for abscess or perforation.  Given patient's pain is properly managed, able to tolerate p.o., will send patient out on a 10-day course of ciprofloxacin and metronidazole, Zofran given in the event that she develops nausea to help facilitate maintaining her oral antibiotic regimen.  Patient is comfortable with this plan, she declines any further pain medications, will have her follow-up with primary care.  The patient's workup and evaluation during their Emergency Department stay was reviewed with the patient. She is comfortable going home based on our discussion with her. They are amenable to this plan. They will follow up with PCP in 3 days time. The signs/symptoms to prompt return to the Emergency Department were discussed with the patient and they expressed understanding. All questions were answered.       Discharge Medication List as of 4/14/2021 11:38 AM      START taking these medications    Details   ciprofloxacin (CIPRO) 500 MG tablet Take 1 tablet (500 mg) by mouth 2 times daily for 10 days, Disp-20 tablet, R-0, E-Prescribe      metroNIDAZOLE (FLAGYL) 500 MG tablet Take 1 tablet (500 mg) by mouth 3 times daily for 10 days, Disp-30 tablet, R-0, E-PrescribeEat yogurt or cottage cheese daily to prevent diarrhea that can be caused by taking this medication.      ondansetron (ZOFRAN) 4 MG tablet Take 1 tablet (4 mg) by mouth every 8 hours as needed for nausea, Disp-6 tablet, R-0, E-Prescribe              Final diagnoses:   Left lower quadrant abdominal pain   Diverticulitis of colon       4/14/2021   [facility]     Jaya Urias MD  04/14/21 1514

## 2021-04-14 NOTE — ED PROVIDER NOTES
History     Chief Complaint   Patient presents with     Abdominal Pain     HPI  Nikki Lezama is a 61 year old female who developed some lower abdominal pain which came on yesterday afternoon.  It is worse on the left.  Initially she felt like it was a gas cramp but it never really resolved.  It woke her from sleep at about 3 AM and she was unable to get back to sleep due to the pain.  She did try to have a couple of bowel movements and this was very painful for her, however she only had small quite thin brown results.  She is walking hunched over due to the pain.  Feels better with her legs pulled up.  Moving much causes worse pain.  Urinating normally.  Was not aware of it but she does have a low-grade fever.  She has had nausea but no emesis.  No chest pain heaviness tightness or squeezing.  No URI type symptoms.  She was due for her second Covid shot today.    Allergies:  Allergies   Allergen Reactions     Cats      Other reaction(s): Runny Nose     Dust Mites Other (See Comments) and Itching     Sneezing, watery eyes     Ragweeds      Runny Nose; Sneezing, watery eyes       Problem List:    Patient Active Problem List    Diagnosis Date Noted     Lumbar spondylosis 10/28/2019     Priority: Medium     Fatty liver disease, nonalcoholic 05/03/2016     Priority: Medium     MODESTO (generalized anxiety disorder) 06/02/2014     Priority: Medium     Hyperlipidemia 06/02/2014     Priority: Medium     Sensorineural hearing loss, asymmetrical 10/02/2008     Priority: Medium     Esophageal reflux 02/29/2008     Priority: Medium     Mechanical complication due to breast prosthesis 02/29/2008     Priority: Medium     Overview:   ruptured implants       Mild persistent asthma without complication 02/29/2008     Priority: Medium     History of supraventricular tachycardia 01/12/2007     Priority: Medium     Overview:   SVT       Perennial allergic rhinitis 01/12/2007     Priority: Medium        Past Medical History:    Past  Medical History:   Diagnosis Date     Allergic rhinitis 01/12/2007     Amblyopia of eye      Dysplasia of cervix uteri 01/12/2007     Female infertility      Gastro-esophageal reflux disease without esophagitis 02/29/2008     Glaucoma      Lateral epicondylitis of elbow 08/08/2014     Mild persistent asthma, uncomplicated 02/29/2008     Other mechanical complication of breast prosthesis and implant, initial encounter 02/29/2008     SVT (supraventricular tachycardia) (H) 01/12/2007     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       Past Surgical History:    Past Surgical History:   Procedure Laterality Date     BIOPSY BREAST      1980,1982,benign     COLONOSCOPY  04/19/2010    hemorrhoids o/w nl to TI;rep 10yrs     MAMMOPLASTY AUGMENTATION       OOPHORECTOMY      streak ovaries     OTHER SURGICAL HISTORY      LIPOSUCTION     TONSILLECTOMY         Family History:    Family History   Problem Relation Age of Onset     Thyroid Disease Mother      Heart Disease Mother      Cancer Mother         Cancer,skin     Diabetes Mother      Hyperlipidemia Mother      Hypertension Mother      Other - See Comments Mother         Stroke     Diabetes Father      Heart Disease Father      Hyperlipidemia Father      Hypertension Father      Other - See Comments Father 73        Stroke     Hyperlipidemia Brother      Myocardial Infarction Brother      Hypertension Brother      Obesity Brother      Cerebrovascular Disease Brother      Hyperlipidemia Brother      Myocardial Infarction Brother         x3     Hypertension Brother      Obesity Brother      Hyperlipidemia Sister      Hypertension Sister      Diabetes Sister      Obesity Sister      Other - See Comments Sister         COVID 19     Hyperlipidemia Sister      Diabetes Sister      Obesity Sister      Diabetes Maternal Grandmother         Diabetes     Other - See Comments Maternal Grandmother         Stroke     Diabetes Paternal Grandmother         Diabetes     Other - See Comments Paternal  Grandfather         Stroke     Asthma Sister      Hyperlipidemia Sister      Hypertension Sister      Myocardial Infarction Sister      Anesthesia Reaction No family hx of         Anesthesia Problem     Blood Disease No family hx of         Blood Disease       Social History:  Marital Status:   [2]  Social History     Tobacco Use     Smoking status: Never Smoker     Smokeless tobacco: Never Used   Substance Use Topics     Alcohol use: Yes     Alcohol/week: 0.0 standard drinks     Comment: 3 per week     Drug use: No        Medications:    albuterol (PROAIR HFA/PROVENTIL HFA/VENTOLIN HFA) 108 (90 Base) MCG/ACT inhaler  co-enzyme Q-10 (SM COENZYME Q-10) 100 MG CAPS capsule  Cranberry 1000 MG CAPS  fexofenadine (ALLEGRA) 180 MG tablet  FLUoxetine (PROZAC) 10 MG capsule  metoprolol succinate ER (TOPROL-XL) 25 MG 24 hr tablet  Misc Natural Products (GLUCOSAMINE CHOND COMPLEX/MSM PO)  montelukast (SINGULAIR) 10 MG tablet  Multiple Minerals-Vitamins (CALCIUM-MAGNESIUM-ZINC-D3) TABS  Multiple Vitamin (MULTI-VITAMINS) TABS  omeprazole (PRILOSEC) 20 MG DR capsule  probiotic CAPS  RA KRILL  MG CAPS  rosuvastatin (CRESTOR) 10 MG tablet  ALPRAZolam (XANAX) 0.5 MG tablet  latanoprost (XALATAN) 0.005 % ophthalmic solution  Spacer/Aero-Holding Chambers (AEROCHAMBER MINI CHAMBER) HORACE          Review of Systems   Constitutional: Negative for chills and fever.   HENT: Negative for congestion.    Eyes: Negative for visual disturbance.   Respiratory: Negative for chest tightness and shortness of breath.    Cardiovascular: Negative for chest pain.   Gastrointestinal: Positive for abdominal pain and nausea. Negative for vomiting.   Genitourinary: Negative for dysuria.   Musculoskeletal: Negative for arthralgias.   Skin: Negative for rash.   Neurological: Negative for light-headedness.   Psychiatric/Behavioral: Negative for agitation.       Physical Exam   BP: (!) 151/95  Pulse: 90  Temp: 100.9  F (38.3  C)  Resp:  18  Weight: 81.8 kg (180 lb 5.4 oz)  SpO2: 95 %      Physical Exam  Vitals signs and nursing note reviewed.   Constitutional:       Appearance: She is well-developed.   HENT:      Head: Normocephalic and atraumatic.      Mouth/Throat:      Mouth: Mucous membranes are moist.   Eyes:      Conjunctiva/sclera: Conjunctivae normal.   Cardiovascular:      Rate and Rhythm: Normal rate and regular rhythm.      Heart sounds: Normal heart sounds.   Pulmonary:      Effort: Pulmonary effort is normal.      Breath sounds: Normal breath sounds.   Abdominal:      General: Bowel sounds are decreased.      Tenderness: There is abdominal tenderness in the left lower quadrant. There is rebound.   Neurological:      Mental Status: She is alert.         ED Course     ED Course as of Apr 14 0822 Wed Apr 14, 2021   0727 Patient here with left lower quadrant abdominal pain that began yesterday.  This is very suggestive of diverticulitis.  She is quite uncomfortable and has definite peritoneal signs.  Blood work including blood cultures have been obtained.  Our CT scanner is currently not in service.  It will not be available for another 4 or 5 hours so I am unable to do CT scan.  She also has a low-grade fever of 100.9.  Labs are not back yet however I am going to treat with some Zosyn.  Once labs are back we will consult with general surgery.        Procedures                 Results for orders placed or performed during the hospital encounter of 04/14/21 (from the past 24 hour(s))   CBC with platelets differential   Result Value Ref [place]    WBC 11.7 (H) 4.0 - 11.0 10e9/L    RBC Count 4.22 3.8 - 5.2 10e12/L    Hemoglobin 12.8 11.7 - 15.7 g/dL    Hematocrit 37.6 35.0 - 47.0 %    MCV 89 78 - 100 fl    MCH 30.3 26.5 - 33.0 pg    MCHC 34.0 31.5 - 36.5 g/dL    RDW 14.1 10.0 - 15.0 %    Platelet Count 257 150 - 450 10e9/L    Diff Method Automated Method     % Neutrophils 79.0 %    % Lymphocytes 12.0 %    % Monocytes 8.3 %    % Eosinophils  0.1 %    % Basophils 0.3 %    % Immature Granulocytes 0.3 %    Absolute Neutrophil 9.2 (H) 1.6 - 8.3 10e9/L    Absolute Lymphocytes 1.4 0.8 - 5.3 10e9/L    Absolute Monocytes 1.0 0.0 - 1.3 10e9/L    Absolute Eosinophils 0.0 0.0 - 0.7 10e9/L    Absolute Basophils 0.0 0.0 - 0.2 10e9/L    Abs Immature Granulocytes 0.0 0 - 0.4 10e9/L   Comprehensive metabolic panel   Result Value Ref [place]    Sodium 137 134 - 144 mmol/L    Potassium 3.5 3.5 - 5.1 mmol/L    Chloride 102 98 - 107 mmol/L    Carbon Dioxide 23 21 - 31 mmol/L    Anion Gap 12 3 - 14 mmol/L    Glucose 122 (H) 70 - 105 mg/dL    Urea Nitrogen 11 7 - 25 mg/dL    Creatinine 0.68 0.60 - 1.20 mg/dL    GFR Estimate 87 >60 mL/min/[1.73_m2]    GFR Estimate If Black >90 >60 mL/min/[1.73_m2]    Calcium 9.3 8.6 - 10.3 mg/dL    Bilirubin Total 0.6 0.3 - 1.0 mg/dL    Albumin 4.3 3.5 - 5.7 g/dL    Protein Total 7.2 6.4 - 8.9 g/dL    Alkaline Phosphatase 60 34 - 104 U/L    ALT 34 7 - 52 U/L    AST 23 13 - 39 U/L   Lactic acid whole blood   Result Value Ref [place]    Lactic Acid 1.4 0.7 - 2.0 mmol/L       Medications   sodium chloride 0.9% infusion ( Intravenous New Bag 4/14/21 0791)   ondansetron (ZOFRAN) injection 4 mg (4 mg Intravenous Given 4/14/21 0727)   HYDROmorphone (PF) (DILAUDID) injection 0.3 mg (has no administration in time range)   ketorolac (TORADOL) injection 30 mg (30 mg Intravenous Given 4/14/21 0728)   piperacillin-tazobactam (ZOSYN) intermittent infusion 4.5 g (4.5 g Intravenous New Bag 4/14/21 0762)       Assessments & Plan (with Medical Decision Making)     I have reviewed the nursing notes.    I have reviewed the findings, diagnosis, plan and need for follow up with the patient.  Patient with left lower quadrant pain with positive peritoneal signs concerning for diverticulitis.  CT scan is down until early afternoon, perhaps 5 hours or so from now.  I am concerned about possible perforation of a diverticulitis.  Given this I have started some IV Zosyn.  I  did discuss it with our surgeon, Dr. Wilde, who felt that she could not wait until her CT scan was up but needed a more emergent CT scan.  I therefore called [facility] in [place] and spoke with Dr. Urias in the emergency department, who has accepted the patient in transfer.  She will go by ground ambulance.  A rapid Covid swab has been obtained but is still pending.    New Prescriptions    No medications on file       Final diagnoses:   LLQ abdominal pain   Rebound abdominal tenderness       4/14/2021   [facility]     Yossi Hodge MD  04/14/21 0803       Yossi Hodge MD  04/14/21 0812       Yossi Hodge MD  04/14/21 0822

## 2021-04-14 NOTE — ED TRIAGE NOTES
ED Nursing Triage Note (General)   ________________________________    Nikki Lezama is a 61 year old Female that presents to triage private car  With history of  Lower abdominal pain that started yesterday feeling like a gas cramp, got worse around 0300. Nausea when pain is bad. Worse with moving around. No changes in bowel movements. No vomiting reported by patient   Significant symptoms had onset 1 day(s) ago.  BP (!) 151/95   Pulse 90   Temp 100.9  F (38.3  C) (Tympanic)   Resp 18   Wt 81.8 kg (180 lb 5.4 oz)   SpO2 95%   BMI 33.85 kg/m  t  Patient appears alert  and oriented, in mild distress., and cooperative behavior.    GCS Total = 15  Airway: intact  Breathing noted as Normal.  Circulation Normal  Skin normal  Action taken:  Triage to critical care immediately      PRE HOSPITAL PRIOR LIVING SITUATION Spouse

## 2021-04-14 NOTE — ED NOTES
States she presented to [facility] for abd t/o the night.   States she was told she needs a CT scan for possible diverticulitis. Scanner is down at [facility]

## 2021-04-14 NOTE — DISCHARGE INSTRUCTIONS
What to expect when you have contrast    During your exam, we will inject  contrast  into your vein or artery. (Contrast is a clear liquid with iodine in it. It shows up on X-rays.)    You may feel warm or hot. You may have a metal taste in your mouth and a slight upset stomach. You may also feel pressure near the kidneys and bladder. These effects will last about 1 to 3 minutes.    Please tell us if you have:    Sneezing     Itching    Hives     Swelling in the face    A hoarse voice    Breathing problems    Other new symptoms    Serious problems are rare.  They may include:    Irregular heartbeat     Seizures    Kidney failure              Tissue damage    Shock      Death    If you have any problems during the exam, we  will treat them right away.    When you get home    Call your hospital if you have any new symptoms in the next 2 days, like hives or swelling. (Phone numbers are at the bottom of this page.) Or call your family doctor.     If you have wheezing or trouble breathing, call 911.    Self-care  -Drink at least 4 extra glasses of water today.   This reduces the stress on your kidneys.  -Keep taking your regular medicines.    The contrast will pass out of your body in your  Urine(pee). This will happen in the next 24 hours. You  will not feel this. Your urine will not  change color.    If you have kidney problems or take metformin    Drink 4 to 8 large glasses of water for the next  2 days, if you are not on a fluid restriction.    ?If you take metformin (Glucophage or Glucovance) for diabetes, keep taking it.      ?Your kidney function tests are abnormal.  If you take Metformin, do not take it for 48 hours. Please go to your clinic for a blood test within 3 days after your exam before the restarting this medicine.     (Note to provider:please give patient prescription for lab tests.)    ?Special instructions: -    I have read and understand the above information.    Patient Sign  Here:______________________________________Date:________Time:______    Staff Sign Here:________________________________________Date:_______Time:______      Radiology Departments:     ?[facility]: [phone] ?[place]: [phone]     ?[place]: [phone] ?[place]:[phone]      ?[place]: [phone]  ?[place]: [phone]  ?[place]:[phone]    ?[facility] [place]:[phone]  ?[facility] [place]:[phone]

## 2021-04-15 ENCOUNTER — TELEPHONE (OUTPATIENT)
Dept: INTERNAL MEDICINE | Facility: OTHER | Age: 61
End: 2021-04-15

## 2021-04-15 NOTE — TELEPHONE ENCOUNTER
Can patient still get 2nd covid vaccine if she is on metroNIDAZOLE (FLAGYL) 500 MG tablet and ciprofloxacin (CIPRO) 500 MG tablet?   She was in the ED yesterday and then ended up being transferred to [facility].    Please call patient back.    Thank you   Nikki Saxena on 4/15/2021 at 8:35 AM

## 2021-04-15 NOTE — TELEPHONE ENCOUNTER
After verifying pts name and date of birth with pt, pt notified of message below and states understanding.  Annabelle Saunders LPN

## 2021-04-20 LAB
BACTERIA SPEC CULT: NORMAL
BACTERIA SPEC CULT: NORMAL
SPECIMEN SOURCE: NORMAL
SPECIMEN SOURCE: NORMAL

## 2021-04-21 ENCOUNTER — OFFICE VISIT (OUTPATIENT)
Dept: INTERNAL MEDICINE | Facility: OTHER | Age: 61
End: 2021-04-21
Attending: NURSE PRACTITIONER
Payer: COMMERCIAL

## 2021-04-21 ENCOUNTER — TELEPHONE (OUTPATIENT)
Dept: INTERNAL MEDICINE | Facility: OTHER | Age: 61
End: 2021-04-21

## 2021-04-21 ENCOUNTER — IMMUNIZATION (OUTPATIENT)
Dept: FAMILY MEDICINE | Facility: OTHER | Age: 61
End: 2021-04-21
Attending: FAMILY MEDICINE
Payer: COMMERCIAL

## 2021-04-21 VITALS
DIASTOLIC BLOOD PRESSURE: 78 MMHG | BODY MASS INDEX: 32.62 KG/M2 | OXYGEN SATURATION: 96 % | HEIGHT: 61 IN | RESPIRATION RATE: 16 BRPM | TEMPERATURE: 98.8 F | HEART RATE: 60 BPM | SYSTOLIC BLOOD PRESSURE: 130 MMHG | WEIGHT: 172.8 LBS

## 2021-04-21 DIAGNOSIS — Q96.3 MOSAIC TURNER SYNDROME: ICD-10-CM

## 2021-04-21 DIAGNOSIS — Z12.11 SCREENING FOR COLON CANCER: Primary | ICD-10-CM

## 2021-04-21 DIAGNOSIS — R61 NIGHT SWEATS: ICD-10-CM

## 2021-04-21 PROCEDURE — 0002A PR COVID VAC PFIZER DIL RECON 30 MCG/0.3 ML IM: CPT

## 2021-04-21 PROCEDURE — 99214 OFFICE O/P EST MOD 30 MIN: CPT | Performed by: NURSE PRACTITIONER

## 2021-04-21 PROCEDURE — 91300 PR COVID VAC PFIZER DIL RECON 30 MCG/0.3 ML IM: CPT

## 2021-04-21 RX ORDER — SENNOSIDES 8.8 MG/5ML
1 LIQUID ORAL
COMMUNITY
End: 2021-06-02

## 2021-04-21 RX ORDER — NITROFURANTOIN 25; 75 MG/1; MG/1
CAPSULE ORAL
COMMUNITY
Start: 2020-10-22 | End: 2021-08-06

## 2021-04-21 ASSESSMENT — MIFFLIN-ST. JEOR: SCORE: 1272.23

## 2021-04-21 ASSESSMENT — PAIN SCALES - GENERAL: PAINLEVEL: NO PAIN (0)

## 2021-04-21 NOTE — NURSING NOTE
Chief Complaint   Patient presents with     RECHECK     ED      Patient presents to the clinic today for an ED follow up. Patient was diagnosed with Diverticulitis. Patient states that her symptoms have been improving.  Medication Reconciliation: completed   Ashtyn Lynn LPN  4/21/2021 10:48 AM

## 2021-04-21 NOTE — PROGRESS NOTES
Nursing Notes:   Ashtyn Lynn LPN  4/21/2021 10:54 AM  Signed  Chief Complaint   Patient presents with     RECHECK     ED      Patient presents to the clinic today for an ED follow up. Patient was diagnosed with Diverticulitis. Patient states that her symptoms have been improving.  Medication Reconciliation: completed   Ashtyn REINA Lynn  4/21/2021 10:48 AM     Nursing note reviewed with patient.  Accuracy and completeness verified.     Ms. Lezama is a 61 year old female who presents to the clinic today for an ED follow up visit.      Emergency Department:  [facility] ED    Date of ED Visit:   04/14/21  Reason(s) for ED Visit:  Abdominal Pain   Summary of ED Visit:   Per ED Notes: Patient here with left lower quadrant abdominal pain that began yesterday.  This is very suggestive of diverticulitis.  She is quite uncomfortable and has definite peritoneal signs.  Blood work including blood cultures have been obtained.  Our CT scanner is currently not in service.  It will not be available for another 4 or 5 hours so I am unable to do CT scan.  She also has a low-grade fever of 100.9.  Labs are not back yet however I am going to treat with some Zosyn.  Once labs are back we will consult with general surgery.  Per [facility] ED notes: Transferred here from [place] over concern of a perforated diverticula, patient has history of diverticula, work-up there was a consistent with an acute infectious process, blood work was obtained, IV antibiotics were given, patient was given Zosyn, as well as hydromorphone for symptom relief, patient was transported without incident via EMS.  Her primary concern on presentation is left lower quadrant pain.    Diagnostic Tests/Treatments reviewed: Yes   Follow up needed:     With PCP in 3 days   Update since discharge:     Patient feeling much improved. She reports very mild LLQ discomfort. Has changed her diet. Denies weight loss. Does endorse several episodes of night sweats requiring  linen changes.      START taking these medications     Details   ciprofloxacin (CIPRO) 500 MG tablet Take 1 tablet (500 mg) by mouth 2 times daily for 10 days, Disp-20 tablet, R-0, E-Prescribe       metroNIDAZOLE (FLAGYL) 500 MG tablet Take 1 tablet (500 mg) by mouth 3 times daily for 10 days, Disp-30 tablet, R-0, E-PrescribeEat yogurt or cottage cheese daily to prevent diarrhea that can be caused by taking this medication.       ondansetron (ZOFRAN) 4 MG tablet Take 1 tablet (4 mg) by mouth every 8 hours as needed for nausea, Disp-6 tablet, R-0, E-Prescribe         The patient has a past medical history which is reviewed and listed as below:     Past Medical History:   Diagnosis Date     Allergic rhinitis 01/12/2007     Amblyopia of eye     left eye; patching and vision therapy.     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 Negative for unexpected weight change.        Past medical history significant for Lino's mosaic Krissy Maren   Gastrointestinal: Positive for abdominal pain (Very mild left lower quadrant pain).   All other systems reviewed and are negative.    OBJECTIVE:                                                      /78 (BP Location: Right arm, Patient Position: Sitting, Cuff Size: Adult Large)   Pulse 60   Temp 98.8  F (37.1  " C) (Tympanic)   Resp 16   Ht 1.543 m (5' 0.75\")   Wt 78.4 kg (172 lb 12.8 oz)   SpO2 96%   BMI 32.92 kg/m      Estimated body mass index is 32.92 kg/m  as calculated from the following:    Height as of this encounter: 1.543 m (5' 0.75\").    Weight as of this encounter: 78.4 kg (172 lb 12.8 oz).     PHYSICAL EXAM:  Physical Exam  Vitals signs and nursing note reviewed.   Constitutional:       Appearance: Normal appearance.   HENT:      Head: Normocephalic and atraumatic.      Mouth/Throat:      Mouth: Mucous membranes are moist.      Pharynx: Oropharynx is clear.   Eyes:      Extraocular Movements: Extraocular movements intact.      Conjunctiva/sclera: Conjunctivae normal.      Pupils: Pupils are equal, round, and reactive to light.   Cardiovascular:      Rate and Rhythm: Normal rate and regular rhythm.      Heart sounds: Normal heart sounds.   Pulmonary:      Effort: Pulmonary effort is normal.      Breath sounds: Normal breath sounds.   Abdominal:      General: Bowel sounds are normal.      Palpations: Abdomen is soft.      Tenderness: There is abdominal tenderness (very mild in LLQ with deep palpation).   Musculoskeletal: Normal range of motion.   Skin:     General: Skin is warm and dry.   Neurological:      Mental Status: She is alert and oriented to person, place, and time. Mental status is at baseline.   Psychiatric:         Mood and Affect: Mood normal.         Behavior: Behavior normal.         Thought Content: Thought content normal.         Judgment: Judgment normal.       No results found for any visits on 04/21/21.    ASSESSMENT/PLAN:                                                      Screening for colon cancer  She is overdue for routine screening in addition to reported night sweats, I encourage her to follow through with screening.  - GASTROENTEROLOGY ADULT REF PROCEDURE ONLY    Mosaic Lino syndrome  Chronic.    Night sweats    Diverticulitis  Start food journal and record foods she is eating " to determine trigger foods. We had a lengthy discussion on foods that can encourage diverticulitis and how to reintroduce foods into her diet one by one. Handouts given.  Follow up at first sign of abdominal pain.    I explained my diagnostic considerations and recommendations to the patient, who voiced understanding and agreement with the treatment plan. All questions were answered. We discussed potential side effects of any prescribed or recommended therapies, as well as expectations for response to treatments.     Return if symptoms worsen or fail to improve.       MAKSIM Ley, ERIN-C  Internal Medicine  [facility]    04/23/2021 3:04 PM    Total time spent with this patient was 35 minutes which included chart review, visualization and interpretation of labs/images, time spent with the patient and documentation.     Patient Instructions     Patient Education   Diet for Diverticular Disease  What is diverticular disease?   When the inner wall of your colon weakens and forms small pouches, you have diverticulosis. For most people, this causes no symptoms.   If the pouches become inflamed or infected, you have diverticulitis. Warning signs may include pain in the lower abdomen, chills and vomiting (throwing up).  These conditions are called diverticular disease.  What causes it?  The cause has been linked to many years of eating too little fiber. People who eat plenty of whole grains, fresh fruits and vegetables are less likely to get this disease.   Once the pouches have formed, there is no way to reverse them.   How much fiber should I get?  If you're healing from diverticulitis or surgery to remove the inflamed area, you will at first follow a low-fiber diet (less than 10 grams each day). Then, as your doctor advises, you may slowly add fiber. Increase your intake by 1 to 2 grams a day. Your goal will be 25 to 30 grams a day.   To avoid future problems, continue to get 25 to 30  grams of fiber each day.   How can fiber help?   A high-fiber diet will help you have regular bowel movements. Fiber adds bulk to the stool and helps it pass more easily. Fiber also helps prevent the pouches from growing larger or getting infected.  In the past, doctors have warned patients to avoid eating nuts, seeds and popcorn. They believed these foods could get caught in the pouches and inflame them. But recent studies do not support this idea.   Please ask your dietitian for the handout Fiber Content in Common Foods. It will show you how to get more fiber in your diet.  For informational purposes only. Not to replace the advice of your health care provider.   Copyright   2007 [employer] [facility]. All rights reserved. [employer] 449289 - REV 09/15.       Patient Education     Diverticulitis    Some people get pouches along the wall of the colon as they get older. These pouches are called diverticuli. They often cause no symptoms. If the pouches become blocked, you can get an infection. This infection is called diverticulitis. It causes pain in your lower belly (abdomen) and fever. It may also cause nausea, vomiting, diarrhea, or constipation. If not treated, it can become a serious health problem. It can cause an abscess to form inside the pouch. The abscess may block the intestinal tract. It may even rupture, spreading infection throughout the belly.   When treatment is started early, oral antibiotics alone may be enough to cure diverticulitis. This method is tried first. But if you don't improve or if your condition gets worse while using these medicines, you may need to be admitted to the hospital. There, you will get antibiotics through an IV. You may also have to rest your bowel. That means you won't eat or drink for a period of time. Severe cases may need surgery.   Home care  These guidelines will help you care for yourself at home:     During the acute illness, rest and follow your  healthcare provider's instructions about diet. Sometimes you will need to be on a clear liquid diet to rest your bowel. Once your symptoms are better, you may be told to eat a low-fiber diet for some time. You can eat foods such as:  ? Flake cereal  ? Mashed potatoes  ? Pancakes and waffles  ? Pasta  ? White bread  ? Rice  ? Applesauce  ? Bananas  ? Eggs  ? Fish  ? Poultry  ? Tofu  ? Cooked soft vegetables    Take antibiotics exactly as told. Don't miss any doses or stop taking the medicine, even if you feel better.    Monitor your temperature. Tell your healthcare provider if you have a rising temperature.  Preventing future attacks  Once you have an episode of diverticulitis, you are at risk for having it again. But you may be able to lower your risk by eating a high-fiber diet (20 g/day to 35 g/day of fiber). This cleans out the colon pouches that already exist. It may also prevent new ones from forming. Foods high in fiber include:     Fresh fruits and edible peelings    Raw or lightly cooked vegetables    Whole-grain cereals and breads    Dried beans and peas    Bran  Here are other steps you can take to help prevent future attacks:     Take your medicines, such as antibiotics, as your healthcare provider says.    Drink 6 to 8 glasses of water every day, unless told otherwise.    Use a heating pad or hot water bottle to help belly cramping or pain.    Start an exercise program. Ask your healthcare provider how to get started. You can benefit from simple activities such as walking or gardening.    Treat diarrhea with a bland diet. Start with liquids only. Then slowly add fiber over time.    Watch for changes in your bowel movements (constipation to diarrhea). Prevent constipation by eating a high-fiber diet and taking a stool softener if needed.    Get plenty of rest and sleep.  Follow-up care  Check in with your healthcare provider as advised or sooner if you are not getting better in the next 2 days.   When to  call your healthcare provider   Call your healthcare provider right away if any of these occur:    Fever of 100.4 F (38 C) or higher, or as directed by your healthcare provider    Repeated vomiting or swelling of the belly    Weakness, dizziness, light-headedness    Pain in your belly that gets worse, is severe, or spreads to your back    Pain that moves to the right lower belly    Rectal bleeding (stools that are red, black, or maroon in color)    Unexpected vaginal bleeding  [employer] last reviewed this educational content on 8/1/2019 2000-2021 [employer]. All rights reserved. This information is not intended as a substitute for professional medical care. Always follow your healthcare professional's instructions.

## 2021-04-22 DIAGNOSIS — Z01.818 PRE-OP TESTING: Primary | ICD-10-CM

## 2021-04-22 DIAGNOSIS — Z12.11 SPECIAL SCREENING FOR MALIGNANT NEOPLASMS, COLON: ICD-10-CM

## 2021-04-22 RX ORDER — POLYETHYLENE GLYCOL 3350 17 G/17G
POWDER, FOR SOLUTION ORAL
Qty: 510 G | Refills: 0 | Status: ON HOLD | OUTPATIENT
Start: 2021-04-22 | End: 2021-06-09

## 2021-04-22 RX ORDER — BISACODYL 5 MG/1
TABLET, DELAYED RELEASE ORAL
Qty: 2 TABLET | Refills: 0 | Status: ON HOLD | OUTPATIENT
Start: 2021-04-22 | End: 2021-06-09

## 2021-04-22 NOTE — TELEPHONE ENCOUNTER
Screening Questions for the Scheduling of Screening Colonoscopies   (If Colonoscopy is diagnostic, Provider should review the chart before scheduling.)  Are you younger than 50 or older than 80?   N O  Do you take aspirin or fish oil?  NO  (if yes, tell patient to stop 1 week prior to Colonoscopy)  Do you take warfarin (Coumadin), clopidogrel (Plavix), apixaban (Eliquis), dabigatram (Pradaxa), rivaroxaban (Xarelto) or any blood thinner? NO   Do you use oxygen at home?  NO   Do you have kidney disease? NO   Are you on dialysis? NO   Have you had a stroke or heart attack in the last year? NO   Have you had a stent in your heart or any blood vessel in the last year? NO   Have you had a transplant of any organ? NO   Have you had a colonoscopy or upper endoscopy (EGD) before? YES          When?  11 YRS AGO   Date of scheduled Colonoscopy. 06/09/2021  Provider [facility]   [facility]acy [facility]

## 2021-04-23 ASSESSMENT — ENCOUNTER SYMPTOMS
ABDOMINAL PAIN: 1
DIAPHORESIS: 1
UNEXPECTED WEIGHT CHANGE: 0

## 2021-04-23 NOTE — PATIENT INSTRUCTIONS
Patient Education   Diet for Diverticular Disease  What is diverticular disease?   When the inner wall of your colon weakens and forms small pouches, you have diverticulosis. For most people, this causes no symptoms.   If the pouches become inflamed or infected, you have diverticulitis. Warning signs may include pain in the lower abdomen, chills and vomiting (throwing up).  These conditions are called diverticular disease.  What causes it?  The cause has been linked to many years of eating too little fiber. People who eat plenty of whole grains, fresh fruits and vegetables are less likely to get this disease.   Once the pouches have formed, there is no way to reverse them.   How much fiber should I get?  If you're healing from diverticulitis or surgery to remove the inflamed area, you will at first follow a low-fiber diet (less than 10 grams each day). Then, as your doctor advises, you may slowly add fiber. Increase your intake by 1 to 2 grams a day. Your goal will be 25 to 30 grams a day.   To avoid future problems, continue to get 25 to 30 grams of fiber each day.   How can fiber help?   A high-fiber diet will help you have regular bowel movements. Fiber adds bulk to the stool and helps it pass more easily. Fiber also helps prevent the pouches from growing larger or getting infected.  In the past, doctors have warned patients to avoid eating nuts, seeds and popcorn. They believed these foods could get caught in the pouches and inflame them. But recent studies do not support this idea.   Please ask your dietitian for the handout Fiber Content in Common Foods. It will show you how to get more fiber in your diet.  For informational purposes only. Not to replace the advice of your health care provider.   Copyright   2007 [facility]. All rights reserved. [employer] [id number] - REV 09/15.       Patient Education     Diverticulitis    Some people get pouches along the wall of the colon as they get older.  These pouches are called diverticuli. They often cause no symptoms. If the pouches become blocked, you can get an infection. This infection is called diverticulitis. It causes pain in your lower belly (abdomen) and fever. It may also cause nausea, vomiting, diarrhea, or constipation. If not treated, it can become a serious health problem. It can cause an abscess to form inside the pouch. The abscess may block the intestinal tract. It may even rupture, spreading infection throughout the belly.   When treatment is started early, oral antibiotics alone may be enough to cure diverticulitis. This method is tried first. But if you don't improve or if your condition gets worse while using these medicines, you may need to be admitted to the hospital. There, you will get antibiotics through an IV. You may also have to rest your bowel. That means you won't eat or drink for a period of time. Severe cases may need surgery.   Home care  These guidelines will help you care for yourself at home:     During the acute illness, rest and follow your healthcare provider's instructions about diet. Sometimes you will need to be on a clear liquid diet to rest your bowel. Once your symptoms are better, you may be told to eat a low-fiber diet for some time. You can eat foods such as:  ? Flake cereal  ? Mashed potatoes  ? Pancakes and waffles  ? Pasta  ? White bread  ? Rice  ? Applesauce  ? Bananas  ? Eggs  ? Fish  ? Poultry  ? Tofu  ? Cooked soft vegetables    Take antibiotics exactly as told. Don't miss any doses or stop taking the medicine, even if you feel better.    Monitor your temperature. Tell your healthcare provider if you have a rising temperature.  Preventing future attacks  Once you have an episode of diverticulitis, you are at risk for having it again. But you may be able to lower your risk by eating a high-fiber diet (20 g/day to 35 g/day of fiber). This cleans out the colon pouches that already exist. It may also prevent new  ones from forming. Foods high in fiber include:     Fresh fruits and edible peelings    Raw or lightly cooked vegetables    Whole-grain cereals and breads    Dried beans and peas    Bran  Here are other steps you can take to help prevent future attacks:     Take your medicines, such as antibiotics, as your healthcare provider says.    Drink 6 to 8 glasses of water every day, unless told otherwise.    Use a heating pad or hot water bottle to help belly cramping or pain.    Start an exercise program. Ask your healthcare provider how to get started. You can benefit from simple activities such as walking or gardening.    Treat diarrhea with a bland diet. Start with liquids only. Then slowly add fiber over time.    Watch for changes in your bowel movements (constipation to diarrhea). Prevent constipation by eating a high-fiber diet and taking a stool softener if needed.    Get plenty of rest and sleep.  Follow-up care  Check in with your healthcare provider as advised or sooner if you are not getting better in the next 2 days.   When to call your healthcare provider   Call your healthcare provider right away if any of these occur:    Fever of 100.4 F (38 C) or higher, or as directed by your healthcare provider    Repeated vomiting or swelling of the belly    Weakness, dizziness, light-headedness    Pain in your belly that gets worse, is severe, or spreads to your back    Pain that moves to the right lower belly    Rectal bleeding (stools that are red, black, or maroon in color)    Unexpected vaginal bleeding  [employer] last reviewed this educational content on 8/1/2019 2000-2021 [employer]. All rights reserved. This information is not intended as a substitute for professional medical care. Always follow your healthcare professional's instructions.

## 2021-05-20 ENCOUNTER — TELEPHONE (OUTPATIENT)
Dept: UROLOGY | Facility: OTHER | Age: 61
End: 2021-05-20

## 2021-05-20 NOTE — TELEPHONE ENCOUNTER
Called patient to schedule an annual appointment. She states she will call back when she is ready to schedule.    Mitzy Gan on 5/20/2021 at 1:58 PM

## 2021-05-29 ENCOUNTER — RECORDS - HEALTHEAST (OUTPATIENT)
Dept: ADMINISTRATIVE | Facility: CLINIC | Age: 61
End: 2021-05-29

## 2021-06-05 ENCOUNTER — ALLIED HEALTH/NURSE VISIT (OUTPATIENT)
Dept: FAMILY MEDICINE | Facility: OTHER | Age: 61
End: 2021-06-05
Attending: SURGERY
Payer: COMMERCIAL

## 2021-06-05 DIAGNOSIS — Z01.818 PRE-OP TESTING: ICD-10-CM

## 2021-06-05 LAB
SARS-COV-2 RNA RESP QL NAA+PROBE: NORMAL
SPECIMEN SOURCE: NORMAL

## 2021-06-05 PROCEDURE — C9803 HOPD COVID-19 SPEC COLLECT: HCPCS

## 2021-06-05 PROCEDURE — U0005 INFEC AGEN DETEC AMPLI PROBE: HCPCS | Mod: ZL | Performed by: SURGERY

## 2021-06-05 PROCEDURE — U0003 INFECTIOUS AGENT DETECTION BY NUCLEIC ACID (DNA OR RNA); SEVERE ACUTE RESPIRATORY SYNDROME CORONAVIRUS 2 (SARS-COV-2) (CORONAVIRUS DISEASE [COVID-19]), AMPLIFIED PROBE TECHNIQUE, MAKING USE OF HIGH THROUGHPUT TECHNOLOGIES AS DESCRIBED BY CMS-2020-01-R: HCPCS | Mod: ZL | Performed by: SURGERY

## 2021-06-06 LAB
LABORATORY COMMENT REPORT: NORMAL
SARS-COV-2 RNA RESP QL NAA+PROBE: NEGATIVE
SPECIMEN SOURCE: NORMAL

## 2021-06-09 ENCOUNTER — HOSPITAL ENCOUNTER (OUTPATIENT)
Facility: OTHER | Age: 61
Discharge: HOME | End: 2021-06-09
Attending: SURGERY | Admitting: SURGERY
Payer: COMMERCIAL

## 2021-06-09 ENCOUNTER — ANESTHESIA (OUTPATIENT)
Dept: SURGERY | Facility: OTHER | Age: 61
End: 2021-06-09
Payer: COMMERCIAL

## 2021-06-09 ENCOUNTER — ANESTHESIA EVENT (OUTPATIENT)
Dept: SURGERY | Facility: OTHER | Age: 61
End: 2021-06-09
Payer: COMMERCIAL

## 2021-06-09 VITALS
DIASTOLIC BLOOD PRESSURE: 106 MMHG | BODY MASS INDEX: 32.77 KG/M2 | OXYGEN SATURATION: 97 % | TEMPERATURE: 98.2 F | HEART RATE: 64 BPM | RESPIRATION RATE: 18 BRPM | SYSTOLIC BLOOD PRESSURE: 145 MMHG | WEIGHT: 172 LBS

## 2021-06-09 DIAGNOSIS — K57.30 DIVERTICULOSIS OF COLON WITHOUT DIVERTICULITIS: ICD-10-CM

## 2021-06-09 DIAGNOSIS — K63.5 POLYP OF SIGMOID COLON, UNSPECIFIED TYPE: Primary | ICD-10-CM

## 2021-06-09 PROCEDURE — 250N000011 HC RX IP 250 OP 636: Performed by: NURSE ANESTHETIST, CERTIFIED REGISTERED

## 2021-06-09 PROCEDURE — 258N000003 HC RX IP 258 OP 636: Performed by: SURGERY

## 2021-06-09 PROCEDURE — 250N000009 HC RX 250: Performed by: NURSE ANESTHETIST, CERTIFIED REGISTERED

## 2021-06-09 PROCEDURE — 88305 TISSUE EXAM BY PATHOLOGIST: CPT

## 2021-06-09 PROCEDURE — 45380 COLONOSCOPY AND BIOPSY: CPT | Performed by: SURGERY

## 2021-06-09 PROCEDURE — 45385 COLONOSCOPY W/LESION REMOVAL: CPT | Performed by: NURSE ANESTHETIST, CERTIFIED REGISTERED

## 2021-06-09 PROCEDURE — 45385 COLONOSCOPY W/LESION REMOVAL: CPT | Mod: PT | Performed by: SURGERY

## 2021-06-09 RX ORDER — NALOXONE HYDROCHLORIDE 0.4 MG/ML
0.2 INJECTION, SOLUTION INTRAMUSCULAR; INTRAVENOUS; SUBCUTANEOUS
Status: DISCONTINUED | OUTPATIENT
Start: 2021-06-09 | End: 2021-06-09 | Stop reason: HOSPADM

## 2021-06-09 RX ORDER — PROCHLORPERAZINE MALEATE 5 MG
10 TABLET ORAL EVERY 6 HOURS PRN
Status: DISCONTINUED | OUTPATIENT
Start: 2021-06-09 | End: 2021-06-09 | Stop reason: HOSPADM

## 2021-06-09 RX ORDER — NALOXONE HYDROCHLORIDE 0.4 MG/ML
0.4 INJECTION, SOLUTION INTRAMUSCULAR; INTRAVENOUS; SUBCUTANEOUS
Status: DISCONTINUED | OUTPATIENT
Start: 2021-06-09 | End: 2021-06-09 | Stop reason: HOSPADM

## 2021-06-09 RX ORDER — PROPOFOL 10 MG/ML
INJECTION, EMULSION INTRAVENOUS CONTINUOUS PRN
Status: DISCONTINUED | OUTPATIENT
Start: 2021-06-09 | End: 2021-06-09

## 2021-06-09 RX ORDER — PROPOFOL 10 MG/ML
INJECTION, EMULSION INTRAVENOUS PRN
Status: DISCONTINUED | OUTPATIENT
Start: 2021-06-09 | End: 2021-06-09

## 2021-06-09 RX ORDER — ONDANSETRON 2 MG/ML
4 INJECTION INTRAMUSCULAR; INTRAVENOUS
Status: DISCONTINUED | OUTPATIENT
Start: 2021-06-09 | End: 2021-06-09 | Stop reason: HOSPADM

## 2021-06-09 RX ORDER — LIDOCAINE HYDROCHLORIDE 20 MG/ML
INJECTION, SOLUTION INFILTRATION; PERINEURAL PRN
Status: DISCONTINUED | OUTPATIENT
Start: 2021-06-09 | End: 2021-06-09

## 2021-06-09 RX ORDER — LIDOCAINE 40 MG/G
CREAM TOPICAL
Status: DISCONTINUED | OUTPATIENT
Start: 2021-06-09 | End: 2021-06-09 | Stop reason: HOSPADM

## 2021-06-09 RX ORDER — SODIUM CHLORIDE, SODIUM LACTATE, POTASSIUM CHLORIDE, CALCIUM CHLORIDE 600; 310; 30; 20 MG/100ML; MG/100ML; MG/100ML; MG/100ML
INJECTION, SOLUTION INTRAVENOUS CONTINUOUS
Status: DISCONTINUED | OUTPATIENT
Start: 2021-06-09 | End: 2021-06-09 | Stop reason: HOSPADM

## 2021-06-09 RX ORDER — ONDANSETRON 2 MG/ML
4 INJECTION INTRAMUSCULAR; INTRAVENOUS EVERY 6 HOURS PRN
Status: DISCONTINUED | OUTPATIENT
Start: 2021-06-09 | End: 2021-06-09 | Stop reason: HOSPADM

## 2021-06-09 RX ORDER — FLUMAZENIL 0.1 MG/ML
0.2 INJECTION, SOLUTION INTRAVENOUS
Status: DISCONTINUED | OUTPATIENT
Start: 2021-06-09 | End: 2021-06-09 | Stop reason: HOSPADM

## 2021-06-09 RX ORDER — ONDANSETRON 4 MG/1
4 TABLET, ORALLY DISINTEGRATING ORAL EVERY 6 HOURS PRN
Status: DISCONTINUED | OUTPATIENT
Start: 2021-06-09 | End: 2021-06-09 | Stop reason: HOSPADM

## 2021-06-09 RX ADMIN — PROPOFOL 140 MCG/KG/MIN: 10 INJECTION, EMULSION INTRAVENOUS at 11:58

## 2021-06-09 RX ADMIN — SODIUM CHLORIDE, POTASSIUM CHLORIDE, SODIUM LACTATE AND CALCIUM CHLORIDE: 600; 310; 30; 20 INJECTION, SOLUTION INTRAVENOUS at 10:58

## 2021-06-09 RX ADMIN — PROPOFOL 30 MG: 10 INJECTION, EMULSION INTRAVENOUS at 11:58

## 2021-06-09 RX ADMIN — LIDOCAINE HYDROCHLORIDE 20 MG: 20 INJECTION, SOLUTION INFILTRATION; PERINEURAL at 11:58

## 2021-06-09 RX ADMIN — PROPOFOL 30 MG: 10 INJECTION, EMULSION INTRAVENOUS at 12:08

## 2021-06-09 RX ADMIN — PROPOFOL 20 MG: 10 INJECTION, EMULSION INTRAVENOUS at 12:00

## 2021-06-09 RX ADMIN — PROPOFOL 20 MG: 10 INJECTION, EMULSION INTRAVENOUS at 12:09

## 2021-06-09 RX ADMIN — PROPOFOL 20 MG: 10 INJECTION, EMULSION INTRAVENOUS at 12:02

## 2021-06-09 NOTE — DISCHARGE INSTRUCTIONS
Procedure you had done: colonoscopy with removal of sigmoid polyp  Your health care provider is:  Noris Medellin  Your surgeon is Dr. Herlinda Wilde.   Please call your health care provider or surgeon at [phone] if:    - you feel you are getting worse or having an increase in problems    - fever greater than 101 degrees  - increasing shortness of breath or chest pain  - any signs of infection (increasing redness, swelling, tenderness, warmth, change in appearance, or  increased drainage)  - blood in your urine or stool  - coughing or vomiting blood  - nausea (upset stomach) and vomiting and/or diarrhea that will not stop  - severe pain that is not relieved by medicine, rest or ice  You have had medications for sedation. Please be aware that this can cause drowsiness and impaired judgment for up to 24 hours after your procedure. Do not drive, operate power tools or drink alcohol for 24 hours.  If samples were taken-you will get a phone call and a letter with your results in the next 7-10 days. If you don't get results, please call and let us know!

## 2021-06-09 NOTE — H&P
PRE-PROCEDURE NOTE    CHIEF COMPLAINT / REASON FORPROCEDURE:  Need for screening colonoscopy.    PERTINENT HISTORY   Patient with no complaints. Previous colonoscopy 2010-no polyps. No diarrhea, constipation, abdominal pain or rectal bleeding currently-had diverticulitis previously- 4/14/21No family history of colon polyps or colon cancer.  Past Medical History:   Diagnosis Date     Allergic rhinitis 01/12/2007     Amblyopia of eye     left eye; patching and vision therapy.     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    ALLERGIES/SENSITIVITIES:   Allergies   Allergen Reactions     Cats      Other reaction(s): Runny Nose     Dust Mites Other (See Comments) and Itching     Sneezing, watery eyes     Ragweeds      Runny Nose; Sneezing, watery eyes        CURRENTMEDICATIONS:    No current facility-administered medications on file prior to encounter.   albuterol (PROAIR HFA/PROVENTIL HFA/VENTOLIN HFA) 108 (90 Base) MCG/ACT inhaler, Inhale 2 puffs into the  lungs every 4 hours as needed for shortness of breath / dyspnea  co-enzyme Q-10 (SM COENZYME Q-10) 100 MG CAPS capsule, Take 100 mg by mouth daily  Cranberry 1000 MG CAPS,   latanoprost (XALATAN) 0.005 % ophthalmic solution,   metoprolol succinate ER (TOPROL-XL) 25 MG 24 hr tablet, Take 1 tablet (25 mg) by mouth daily (Patient taking differently: Take 25 mg by mouth daily For SVT)  Misc Natural Products (GLUCOSAMINE CHOND COMPLEX/MSM PO), Take 1 tablet by mouth daily  montelukast (SINGULAIR) 10 MG tablet, Take 1 tablet (10 mg) by mouth daily  Multiple Minerals-Vitamins (CALCIUM-MAGNESIUM-ZINC-D3) TABS, Take 1 tablet by mouth daily   omeprazole (PRILOSEC) 20 MG DR capsule, Take 1 capsule (20 mg) by mouth every morning (before breakfast)  probiotic CAPS, Take 1 capsule by mouth daily  RA KRILL  MG CAPS, Take 1 tablet by mouth daily  rosuvastatin (CRESTOR) 10 MG tablet, Take 1 tablet (10 mg) by mouth daily  ALPRAZolam (XANAX) 0.5 MG tablet, Take 1 tablet (0.5 mg) by mouth 2 times daily as needed for anxiety (for flying) For flying  fexofenadine (ALLEGRA) 180 MG tablet, Take 180 mg by mouth daily  FLUoxetine (PROZAC) 10 MG capsule, Take 1 capsule (10 mg) by mouth daily  nitroFURantoin macrocrystal-monohydrate (MACROBID) 100 MG capsule, TAKE 1 CAPSULE (100 MG) BY MOUTH 2 TIMES DAILY X 5 DAYS THEN DISCONTINUE  Spacer/Aero-Holding Chambers (AEROCHAMBER MINI CHAMBER) HORACE, For home use.      Current Facility-Administered Medications   Medication     lactated ringers infusion     lidocaine (LMX4) cream     lidocaine 1 % 0.1-1 mL     ondansetron (ZOFRAN) injection 4 mg     sodium chloride (PF) 0.9% PF flush 3 mL     sodium chloride (PF) 0.9% PF flush 3 mL       PRE-SEDATION ASSESSMENT:    /88   Pulse 75   Temp 99.2  F (37.3  C) (Tympanic)   Resp 18   Wt 78 kg (172 lb)   SpO2 95%   BMI 32.77 kg/m    Lung Exam:  Normal  Heart Exam:  Normal    Comment(s):      IMPRESSION:  Need for screening  colonoscopy.    PLAN:  I discussed screening colonoscopy with the patient.

## 2021-06-09 NOTE — ANESTHESIA POSTPROCEDURE EVALUATION
Patient: Nikki Lezama    Procedure(s):  COLONOSCOPY, WITH POLYPECTOMY AND BIOPSY    Diagnosis:Encounter for screening colonoscopy [Z12.11]  Diagnosis Additional Information: No value filed.    Anesthesia Type:  MAC    Note:  Disposition: Outpatient   Postop Pain Control: Uneventful            Sign Out: Well controlled pain   PONV: No   Neuro/Psych: Uneventful            Sign Out: Acceptable/Baseline neuro status   Airway/Respiratory: Uneventful            Sign Out: Acceptable/Baseline resp. status   CV/Hemodynamics: Uneventful            Sign Out: Acceptable CV status; No obvious hypovolemia; No obvious fluid overload   Other NRE: NONE   DID A NON-ROUTINE EVENT OCCUR? No           Last vitals:  Vitals:    06/09/21 1230 06/09/21 1245 06/09/21 1300   BP: 108/64 127/82 (!) 145/106   Pulse: 71 56 64   Resp: 16  18   Temp: 97.7  F (36.5  C)  98.2  F (36.8  C)   SpO2: 96% 95% 97%       Last vitals prior to Anesthesia Care Transfer:  CRNA VITALS  6/9/2021 1155 - 6/9/2021 1255      6/9/2021             Pulse:  82    Ht Rate:  82    SpO2:  96 %          Electronically Signed By: MAKSIM QUEVEDO CRNA  June 9, 2021  1:44 PM

## 2021-06-09 NOTE — ANESTHESIA PREPROCEDURE EVALUATION
Anesthesia Pre-Procedure Evaluation    Patient: Nikki Lezama   MRN: [id number] : 1960        Preoperative Diagnosis: Encounter for screening colonoscopy [Z12.11]   Procedure : Procedure(s):  COLONOSCOPY     Past Medical History:   Diagnosis Date     Allergic rhinitis 2007     Amblyopia of eye     left eye; patching and vision therapy.     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      Past Surgical History:   Procedure Laterality Date     BIOPSY BREAST      ,,benign     COLONOSCOPY  2010    hemorrhoids o/w nl to TI;rep 10yrs     MAMMOPLASTY AUGMENTATION       OOPHORECTOMY      streak ovaries     OTHER SURGICAL HISTORY      LIPOSUCTION     TONSILLECTOMY        Allergies   Allergen Reactions     Cats      Other reaction(s): Runny Nose     Dust Mites Other (See Comments) and Itching     Sneezing, watery eyes     Ragweeds      Runny Nose; Sneezing, watery eyes      Social History     Tobacco Use     Smoking status: Never Smoker     Smokeless tobacco: Never Used   Substance Use Topics     Alcohol use: Yes     Alcohol/week: 0.0 standard drinks     Comment: 3 per week      Wt Readings from Last 1 Encounters:   21 78 kg (172 lb)        Anesthesia Evaluation   Pt has had prior anesthetic. Type: General.        ROS/MED HX  ENT/Pulmonary:     (+) Mild Persistent, asthma Treatment: Inhaler prn,      Neurologic:  - neg neurologic ROS     Cardiovascular:     (+) Dyslipidemia  hypertension-----Irregular Heartbeat/Palpitations,     METS/Exercise Tolerance: >4 METS Comment: Hx of SVT-on metoprolol. Has episodes a few times a year and treats at home with ice water to her face   Hematologic:  - neg hematologic  ROS     Musculoskeletal:  - neg musculoskeletal ROS     GI/Hepatic: Comment: Non alcoholic fatty liver disease  Diverticulitis in April    (+) GERD, Asymptomatic on medication, bowel prep, liver disease,     Renal/Genitourinary:  - neg Renal ROS     Endo:  - neg endo ROS     Psychiatric/Substance Use:     (+) psychiatric history anxiety     Infectious Disease:  - neg infectious disease ROS     Malignancy:  - neg malignancy ROS     Other:  - neg other ROS          Physical Exam    Airway        Mallampati: I   TM distance: > 3 FB   Neck ROM: full   Mouth opening: > 3 cm    Respiratory Devices and Support         Dental  no notable dental history         Cardiovascular   cardiovascular exam normal          Pulmonary   pulmonary exam normal                OUTSIDE LABS:  CBC:   Lab Results   Component Value Date    WBC 11.7 (H) 04/14/2021    WBC 7.0 06/22/2020    HGB 12.8 04/14/2021    HGB 14.5 06/22/2020    HCT 37.6 04/14/2021    HCT 44.4 06/22/2020     04/14/2021     06/22/2020     BMP:   Lab Results   Component Value Date     04/14/2021     06/22/2020    POTASSIUM 3.5 04/14/2021    POTASSIUM 4.5 06/22/2020    CHLORIDE 102 04/14/2021    CHLORIDE 102 06/22/2020    CO2 23 04/14/2021    CO2 26 06/22/2020    BUN 11 04/14/2021    BUN 14 06/22/2020    CR 0.68 04/14/2021    CR 0.76 06/22/2020     (H) 04/14/2021     (H) 06/22/2020     COAGS: No results found for: PTT, INR, FIBR  POC: No results found for: BGM, HCG, HCGS  HEPATIC:   Lab Results   Component Value Date    ALBUMIN 4.3 04/14/2021    PROTTOTAL 7.2 04/14/2021    ALT 34 04/14/2021    AST 23 04/14/2021    ALKPHOS 60 04/14/2021    BILITOTAL 0.6 04/14/2021     OTHER:   Lab Results   Component Value  Date    LACT 1.4 04/14/2021    A1C 6.0 06/22/2020    MALDONADO 9.3 04/14/2021    MAG 2.1 06/22/2020    LIPASE 29 09/12/2018    AMYLASE 18 (L) 09/12/2018    T4 0.85 06/21/2019    CRP 0.2 09/12/2018    SED 8 06/21/2019       Anesthesia Plan    ASA Status:  2   NPO Status:  NPO Appropriate    Anesthesia Type: MAC.              Consents    Anesthesia Plan(s) and associated risks, benefits, and realistic alternatives discussed. Questions answered and patient/representative(s) expressed understanding.     - Discussed with:  Patient      - Extended Intubation/Ventilatory Support Discussed: No.      - Patient is DNR/DNI Status: No    Use of blood products discussed: No .     Postoperative Care            Comments:    Mosaic Lino syndrome            MAKSIM VERMA CRNA

## 2021-06-09 NOTE — OR NURSING
Pt.  has been discharged to home at ambulatory via [facility] accompanied by friend.    Written discharge instructions were provided to patient.Patient states their pain is cramping which is relieved with passing flatus, and denies any nausea or dizziness upon discharge.    Patient and adult caring for them verbalize understanding of discharge instructions including no driving until tomorrow and no longer taking narcotic pain medications - no operating mechanical equipment and no making any important decisions.They understand reason for discharge, and necessary follow-up appointments.

## 2021-06-09 NOTE — OP NOTE
PROCEDURE NOTE    SURGEON: Herlinda Gordillo MD.    PRE-OP DIAGNOSIS:  Screening Colonoscopy      POST-OP DIAGNOSIS: colon polyp , diverticula sigmoid colon     Location: Sigmoid  Size: 1.0 cm  Removed:  Y    PROCEDURE:  Colonoscopy with polypectomy cold snare    ESTIMATEDBLOOD LOSS: none    COMPLICATIONS:  None    SPECIMEN:  Sigmoid polyp    ANESTHESIA:  See anesthesia note    INDICATION FOR THE PROCEDURE: The patient is a 61 year old female. The patient has no complaints. I explained to the patient the risks, benefits and alternatives to screening colonoscopy for evaluating the colon for colon polyps and colon cancer. We specifically discussed the risks of bleeding, infection, perforation, potential inability to reach the cecum and the risks of sedation. The patient's questions were answered and the patient wished to proceed. Informed consent paperwork was completed.    PROCEDURE: The patient was taken to the endoscopy suite. Appropriate monitors were attached. The patient was placed in the left lateral decubitus position.Timeout was performed confirming the patient's identity and procedure to be performed. After appropriate sedation was confirmed, digital rectal exam was performed. There was normal tone and no gross abnormality was noted. The lubricated colonoscope was introduced into the anus the colon was insufflated with air. The prep quality was adequate. Under direct visualization the scope was advanced to the cecum. The ileocecal valve was intubated and the terminal ileum inspected. No gross abnormality was noted. The scope was withdrawn back into the cecum. The mucosa of colon was inspected while withdrawing the scope. A pedunculated moderate sized polyp was noted in the sigmoid colon and removed with cold snare. Diverticula were noted in the sigmoid colon. The scope was retroflexed in the rectum and the anorectal junction was inspected. No abnormalities were noted. The scope was returned to a neutral position  and the colon was decompressed. The scope was removed. The patient tolerated the procedure with no immediately apparent complication. The patient was taken to recovery in stable condition.  FOLLOW UP:  RECOMMEND high fiber diet, follow up: will call with pathology results.

## 2021-06-09 NOTE — ANESTHESIA CARE TRANSFER NOTE
Patient: Nikki Lezama    Procedure(s):  COLONOSCOPY, WITH POLYPECTOMY AND BIOPSY    Diagnosis: Encounter for screening colonoscopy [Z12.11]  Diagnosis Additional Information: No value filed.    Anesthesia Type:   MAC     Note:      Level of Consciousness: awake  Oxygen Supplementation: room air    Independent Airway: airway patency satisfactory and stable    Vital Signs Stable: post-procedure vital signs reviewed and stable  Report to RN Given: handoff report given  Patient transferred to: Phase II    Handoff Report: Identifed the Patient, Identified the Reponsible Provider, Reviewed the pertinent medical history, Discussed the surgical course, Reviewed Intra-OP anesthesia mangement and issues during anesthesia, Set expectations for post-procedure period and Allowed opportunity for questions and acknowledgement of understanding      Vitals: (Last set prior to Anesthesia Care Transfer)  CRNA VITALS  6/9/2021 1155 - 6/9/2021 1226      6/9/2021             Pulse:  82    Ht Rate:  82    SpO2:  96 %        Electronically Signed By: MAKSIM VERMA CRNA  June 9, 2021  12:26 PM

## 2021-07-22 DIAGNOSIS — E78.2 MIXED HYPERLIPIDEMIA: ICD-10-CM

## 2021-07-22 DIAGNOSIS — D72.829 LEUKOCYTOSIS, UNSPECIFIED TYPE: ICD-10-CM

## 2021-07-22 DIAGNOSIS — Z86.79 HISTORY OF SUPRAVENTRICULAR TACHYCARDIA: ICD-10-CM

## 2021-07-22 DIAGNOSIS — J45.30 MILD PERSISTENT ASTHMA WITHOUT COMPLICATION: Primary | ICD-10-CM

## 2021-07-22 DIAGNOSIS — R73.03 PREDIABETES: ICD-10-CM

## 2021-07-22 RX ORDER — MONTELUKAST SODIUM 10 MG/1
TABLET ORAL
Qty: 90 TABLET | Refills: 4 | Status: SHIPPED | OUTPATIENT
Start: 2021-07-22 | End: 2022-10-17

## 2021-07-22 RX ORDER — METOPROLOL SUCCINATE 25 MG/1
TABLET, EXTENDED RELEASE ORAL
Qty: 90 TABLET | Refills: 4 | Status: SHIPPED | OUTPATIENT
Start: 2021-07-22 | End: 2022-10-17

## 2021-07-22 RX ORDER — ROSUVASTATIN CALCIUM 10 MG/1
TABLET, COATED ORAL
Qty: 90 TABLET | Refills: 4 | Status: SHIPPED | OUTPATIENT
Start: 2021-07-22 | End: 2022-10-17

## 2021-07-22 NOTE — TELEPHONE ENCOUNTER
Refill request for Toprol, Crestor, Singulair from [facility].  Last office visit with PCP on 2/23/21, saw [place] on 4/21/21.  Singulair passes protocol, however, Crestor doesn't due to needing Lipid panel-last one done on 1- and Toprol doesn't due to BP being over limit at 145/106 on 6/9/2021.  Filled Singulair, routing to PCP to address other refills at this time.  Ritu Estes RN on 7/22/2021 at 10:38 AM

## 2021-07-22 NOTE — TELEPHONE ENCOUNTER
Please advise patient to set up appointment for lipid panel to be rescreened and a blood pressure recheck to ensure she is within goal range.  She can either be scheduled with Dr. Medellin, myself or any other available provider.

## 2021-07-26 NOTE — TELEPHONE ENCOUNTER
Patient set up appointment for labs only for a lipid panel.  Order pended.  Also needs a B/P check when she comes in.  Keli Lee LPN on 7/26/2021 at 3:12 PM

## 2021-07-26 NOTE — TELEPHONE ENCOUNTER
Patient notified. Transferred to the appointment desk to schedule.  Magali Schultz CMA([facility])  ..................7/26/2021   4:05 PM

## 2021-07-26 NOTE — TELEPHONE ENCOUNTER
Just to clarify patient is due for an annual physical.  Please schedule her with Maren Hameed in the next 2 to 3 weeks to have this completed.  I have added additional labs and she should come in fasting for these.

## 2021-08-06 ENCOUNTER — OFFICE VISIT (OUTPATIENT)
Dept: INTERNAL MEDICINE | Facility: OTHER | Age: 61
End: 2021-08-06
Attending: NURSE PRACTITIONER
Payer: COMMERCIAL

## 2021-08-06 VITALS
RESPIRATION RATE: 16 BRPM | BODY MASS INDEX: 32.39 KG/M2 | SYSTOLIC BLOOD PRESSURE: 124 MMHG | DIASTOLIC BLOOD PRESSURE: 80 MMHG | TEMPERATURE: 97.8 F | OXYGEN SATURATION: 96 % | HEIGHT: 62 IN | HEART RATE: 70 BPM | WEIGHT: 176 LBS

## 2021-08-06 DIAGNOSIS — D72.829 LEUKOCYTOSIS, UNSPECIFIED TYPE: ICD-10-CM

## 2021-08-06 DIAGNOSIS — R74.8 ELEVATED LIVER ENZYMES: ICD-10-CM

## 2021-08-06 DIAGNOSIS — L98.9 SKIN LESION: ICD-10-CM

## 2021-08-06 DIAGNOSIS — Z79.899 HIGH RISK MEDICATION USE: ICD-10-CM

## 2021-08-06 DIAGNOSIS — E78.2 MIXED HYPERLIPIDEMIA: ICD-10-CM

## 2021-08-06 DIAGNOSIS — Q96.3 MOSAIC TURNER SYNDROME: ICD-10-CM

## 2021-08-06 DIAGNOSIS — J45.30 MILD PERSISTENT ASTHMA WITHOUT COMPLICATION: ICD-10-CM

## 2021-08-06 DIAGNOSIS — R73.03 PREDIABETES: ICD-10-CM

## 2021-08-06 DIAGNOSIS — M51.369 DDD (DEGENERATIVE DISC DISEASE), LUMBAR: ICD-10-CM

## 2021-08-06 DIAGNOSIS — Z12.31 ENCOUNTER FOR SCREENING MAMMOGRAM FOR BREAST CANCER: ICD-10-CM

## 2021-08-06 DIAGNOSIS — F41.8 SITUATIONAL ANXIETY: ICD-10-CM

## 2021-08-06 DIAGNOSIS — M15.0 PRIMARY OSTEOARTHRITIS INVOLVING MULTIPLE JOINTS: ICD-10-CM

## 2021-08-06 DIAGNOSIS — Z00.00 ENCOUNTER FOR ANNUAL PHYSICAL EXAM: Primary | ICD-10-CM

## 2021-08-06 DIAGNOSIS — J30.89 PERENNIAL ALLERGIC RHINITIS: ICD-10-CM

## 2021-08-06 DIAGNOSIS — K21.9 GASTROESOPHAGEAL REFLUX DISEASE WITHOUT ESOPHAGITIS: ICD-10-CM

## 2021-08-06 LAB
ALBUMIN SERPL-MCNC: 4.5 G/DL (ref 3.5–5.7)
ALP SERPL-CCNC: 59 U/L (ref 34–104)
ALT SERPL W P-5'-P-CCNC: 42 U/L (ref 7–52)
ANION GAP SERPL CALCULATED.3IONS-SCNC: 10 MMOL/L (ref 3–14)
AST SERPL W P-5'-P-CCNC: 31 U/L (ref 13–39)
BASOPHILS # BLD AUTO: 0.1 10E3/UL (ref 0–0.2)
BASOPHILS NFR BLD AUTO: 1 %
BILIRUB SERPL-MCNC: 0.4 MG/DL (ref 0.3–1)
BUN SERPL-MCNC: 16 MG/DL (ref 7–25)
CALCIUM SERPL-MCNC: 9.8 MG/DL (ref 8.6–10.3)
CHLORIDE BLD-SCNC: 105 MMOL/L (ref 98–107)
CHOLEST SERPL-MCNC: 212 MG/DL
CO2 SERPL-SCNC: 26 MMOL/L (ref 21–31)
CREAT SERPL-MCNC: 0.69 MG/DL (ref 0.6–1.2)
EOSINOPHIL # BLD AUTO: 0.1 10E3/UL (ref 0–0.7)
EOSINOPHIL NFR BLD AUTO: 2 %
ERYTHROCYTE [DISTWIDTH] IN BLOOD BY AUTOMATED COUNT: 14.1 % (ref 10–15)
FASTING STATUS PATIENT QL REPORTED: ABNORMAL
GFR SERPL CREATININE-BSD FRML MDRD: >90 ML/MIN/1.73M2
GLUCOSE BLD-MCNC: 108 MG/DL (ref 70–105)
HBA1C MFR BLD: 6.1 % (ref 4–6.2)
HCT VFR BLD AUTO: 41 % (ref 35–47)
HDLC SERPL-MCNC: 68 MG/DL (ref 23–92)
HGB BLD-MCNC: 13.9 G/DL (ref 11.7–15.7)
IMM GRANULOCYTES # BLD: 0 10E3/UL
IMM GRANULOCYTES NFR BLD: 0 %
LDLC SERPL CALC-MCNC: 116 MG/DL
LYMPHOCYTES # BLD AUTO: 1.9 10E3/UL (ref 0.8–5.3)
LYMPHOCYTES NFR BLD AUTO: 32 %
MCH RBC QN AUTO: 30.7 PG (ref 26.5–33)
MCHC RBC AUTO-ENTMCNC: 33.9 G/DL (ref 31.5–36.5)
MCV RBC AUTO: 91 FL (ref 78–100)
MONOCYTES # BLD AUTO: 0.6 10E3/UL (ref 0–1.3)
MONOCYTES NFR BLD AUTO: 10 %
NEUTROPHILS # BLD AUTO: 3.3 10E3/UL (ref 1.6–8.3)
NEUTROPHILS NFR BLD AUTO: 55 %
NONHDLC SERPL-MCNC: 144 MG/DL
NRBC # BLD AUTO: 0 10E3/UL
NRBC BLD AUTO-RTO: 0 /100
PLATELET # BLD AUTO: 243 10E3/UL (ref 150–450)
POTASSIUM BLD-SCNC: 3.8 MMOL/L (ref 3.5–5.1)
PROT SERPL-MCNC: 7.3 G/DL (ref 6.4–8.9)
RBC # BLD AUTO: 4.53 10E6/UL (ref 3.8–5.2)
SODIUM SERPL-SCNC: 141 MMOL/L (ref 134–144)
TRIGL SERPL-MCNC: 139 MG/DL
TSH SERPL DL<=0.005 MIU/L-ACNC: 1.37 MU/L (ref 0.4–4)
WBC # BLD AUTO: 6 10E3/UL (ref 4–11)

## 2021-08-06 PROCEDURE — 87070 CULTURE OTHR SPECIMN AEROBIC: CPT | Mod: ZL | Performed by: NURSE PRACTITIONER

## 2021-08-06 PROCEDURE — 80061 LIPID PANEL: CPT | Mod: ZL | Performed by: NURSE PRACTITIONER

## 2021-08-06 PROCEDURE — 84443 ASSAY THYROID STIM HORMONE: CPT | Mod: ZL | Performed by: NURSE PRACTITIONER

## 2021-08-06 PROCEDURE — 80053 COMPREHEN METABOLIC PANEL: CPT | Mod: ZL | Performed by: NURSE PRACTITIONER

## 2021-08-06 PROCEDURE — 36415 COLL VENOUS BLD VENIPUNCTURE: CPT | Mod: ZL | Performed by: NURSE PRACTITIONER

## 2021-08-06 PROCEDURE — 85004 AUTOMATED DIFF WBC COUNT: CPT | Mod: ZL | Performed by: NURSE PRACTITIONER

## 2021-08-06 PROCEDURE — 83036 HEMOGLOBIN GLYCOSYLATED A1C: CPT | Mod: ZL | Performed by: NURSE PRACTITIONER

## 2021-08-06 PROCEDURE — 99396 PREV VISIT EST AGE 40-64: CPT | Performed by: NURSE PRACTITIONER

## 2021-08-06 RX ORDER — ALBUTEROL SULFATE 90 UG/1
2 AEROSOL, METERED RESPIRATORY (INHALATION) EVERY 4 HOURS PRN
Qty: 6.7 G | Refills: 11 | Status: SHIPPED | OUTPATIENT
Start: 2021-08-06

## 2021-08-06 RX ORDER — INHALER, ASSIST DEVICES
SPACER (EA) MISCELLANEOUS
Qty: 1 EACH | Refills: 1 | Status: SHIPPED | OUTPATIENT
Start: 2021-08-06

## 2021-08-06 RX ORDER — FLUOXETINE 10 MG/1
10 CAPSULE ORAL DAILY
Qty: 90 CAPSULE | Refills: 4 | Status: SHIPPED | OUTPATIENT
Start: 2021-08-06 | End: 2022-05-12

## 2021-08-06 ASSESSMENT — ANXIETY QUESTIONNAIRES
3. WORRYING TOO MUCH ABOUT DIFFERENT THINGS: SEVERAL DAYS
6. BECOMING EASILY ANNOYED OR IRRITABLE: NOT AT ALL
2. NOT BEING ABLE TO STOP OR CONTROL WORRYING: SEVERAL DAYS
1. FEELING NERVOUS, ANXIOUS, OR ON EDGE: SEVERAL DAYS
7. FEELING AFRAID AS IF SOMETHING AWFUL MIGHT HAPPEN: SEVERAL DAYS
GAD7 TOTAL SCORE: 4
5. BEING SO RESTLESS THAT IT IS HARD TO SIT STILL: NOT AT ALL
IF YOU CHECKED OFF ANY PROBLEMS ON THIS QUESTIONNAIRE, HOW DIFFICULT HAVE THESE PROBLEMS MADE IT FOR YOU TO DO YOUR WORK, TAKE CARE OF THINGS AT HOME, OR GET ALONG WITH OTHER PEOPLE: NOT DIFFICULT AT ALL

## 2021-08-06 ASSESSMENT — PATIENT HEALTH QUESTIONNAIRE - PHQ9: 5. POOR APPETITE OR OVEREATING: NOT AT ALL

## 2021-08-06 ASSESSMENT — PAIN SCALES - GENERAL: PAINLEVEL: NO PAIN (0)

## 2021-08-06 ASSESSMENT — MIFFLIN-ST. JEOR: SCORE: 1298.64

## 2021-08-06 ASSESSMENT — ENCOUNTER SYMPTOMS: JOINT SWELLING: 1

## 2021-08-06 NOTE — NURSING NOTE
"Chief Complaint   Patient presents with     Physical     Annual well check       Initial /80 (BP Location: Right arm, Patient Position: Sitting, Cuff Size: Adult Regular)   Pulse 70   Temp 97.8  F (36.6  C) (Tympanic)   Resp 16   Ht 1.562 m (5' 1.5\")   Wt 79.8 kg (176 lb)   LMP  (LMP Unknown)   SpO2 96%   Breastfeeding No   BMI 32.72 kg/m   Estimated body mass index is 32.72 kg/m  as calculated from the following:    Height as of this encounter: 1.562 m (5' 1.5\").    Weight as of this encounter: 79.8 kg (176 lb).  Medication Reconciliation: complete  FOOD SECURITY SCREENING QUESTIONS  Hunger Vital Signs:  Within the past 12 months we worried whether our food would run out before we got money to buy more. Never  Within the past 12 months the food we bought just didn't last and we didn't have money to get more. Never      Keli Lee LPN    "

## 2021-08-06 NOTE — PROGRESS NOTES
"Nikki Lezama  : 1960 Age: 61 year old Sex: female MRN: [id number]    CC:   Chief Complaint   Patient presents with     Physical     Annual well check     MODESTO Score:    MODESTO-7 SCORE 2019   Total Score 21 13 4     PHQ-2 Score:     PHQ-2 (  [employer]) 2021   Q1: Little interest or pleasure in doing things 0 0   Q2: Feeling down, depressed or hopeless 1 0   PHQ-2 Score 1 0         Tobacco Use      Smoking status: Never Smoker      Smokeless tobacco: Never Used    NURSE'S NOTES:    Nursing Notes:   Keli Lee LPN  2021 10:06 AM  Signed  Chief Complaint   Patient presents with     Physical     Annual well check       Initial /80 (BP Location: Right arm, Patient Position: Sitting, Cuff Size: Adult Regular)   Pulse 70   Temp 97.8  F (36.6  C) (Tympanic)   Resp 16   Ht 1.562 m (5' 1.5\")   Wt 79.8 kg (176 lb)   LMP  (LMP Unknown)   SpO2 96%   Breastfeeding No   BMI 32.72 kg/m   Estimated body mass index is 32.72 kg/m  as calculated from the following:    Height as of this encounter: 1.562 m (5' 1.5\").    Weight as of this encounter: 79.8 kg (176 lb).  Medication Reconciliation: complete  FOOD SECURITY SCREENING QUESTIONS  Hunger Vital Signs:  Within the past 12 months we worried whether our food would run out before we got money to buy more. Never  Within the past 12 months the food we bought just didn't last and we didn't have money to get more. Never      Keli Lee LPN    Nursing note reviewed with patient.  Accuracy and completeness verified.      SUBJECTIVE:                                                      HPI:   Nikki Lezama presents to the clinic today with complaints of  1 - annual physical and recommended labs/screenings  2 - left middle finger cyst -she reports this has been there for couple months now.  She did look it up on the Internet and feels that it is a cyst filled with fluid.    Denies any concerns at this time.    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   All other systems reviewed and are negative.      Problem List/PMH: Reviewed in EMR, and made relevant updates today.  Medications: Reviewed in EMR, and made relevant updates today.  Allergies: Reviewed in EMR, and made relevant updates today.    OBJECTIVE:                                                      /80 (BP Location: Right arm, Patient Position: Sitting, Cuff Size: Adult Regular)   Pulse 70   Temp 97.8  F (36.6  C) (Tympanic)   Resp 16   Ht 1.562 m (5' 1.5\")   Wt 79.8 kg (176 lb)   LMP  (LMP Unknown)   SpO2 96%   Breastfeeding No   BMI 32.72 kg/m      Current Pain Score:   No Pain (0)     Physical Exam  Vitals and nursing note reviewed.   Constitutional:       Appearance: Normal appearance.   HENT:      Head: Normocephalic.   Eyes:      Extraocular Movements: Extraocular movements intact.      Conjunctiva/sclera: Conjunctivae normal.   Cardiovascular:      Rate and Rhythm: Normal rate and regular rhythm.      Heart sounds: Normal heart sounds.   Pulmonary:      Effort: Pulmonary effort is normal.      Breath sounds: Normal breath sounds.   Abdominal:      General: Bowel sounds are normal. There is no distension.      Palpations: Abdomen is soft. There is no mass.      Tenderness: There is no abdominal tenderness.   Skin:     General: Skin is warm " and dry.      Findings: Lesion (Left middle finger) present.   Neurological:      Mental Status: She is alert and oriented to person, place, and time. Mental status is at baseline.   Psychiatric:         Mood and Affect: Mood normal.         Behavior: Behavior normal.         Thought Content: Thought content normal.         Judgment: Judgment normal.          BP Readings from Last 3 Encounters:   08/06/21 124/80   06/09/21 (!) 145/106   04/21/21 130/78        Vitals:    08/06/21 0952   Weight: 79.8 kg (176 lb)        The 10-year ASCVD risk score (Brody CASTRO Jr., et al., 2013) is: 3.9%    Values used to calculate the score:      Age: 61 years      Sex: Female      Is Non- : No      Diabetic: No      Tobacco smoker: No      Systolic Blood Pressure: 124 mmHg      Is BP treated: No      HDL Cholesterol: 68 mg/dL      Total Cholesterol: 212 mg/dL    Diagnostics Completed at this Visit:    Results for orders placed or performed in visit on 08/06/21   CBC with Platelets & Differential     Status: None    Narrative    The following orders were created for panel order CBC with Platelets & Differential.  Procedure                               Abnormality         Status                     ---------                               -----------         ------                     CBC with platelets and d...[[id number]]                      Final result                 Please view results for these tests on the individual orders.   Comprehensive metabolic panel     Status: Abnormal   Result Value Ref Range    Sodium 141 134 - 144 mmol/L    Potassium 3.8 3.5 - 5.1 mmol/L    Chloride 105 98 - 107 mmol/L    Carbon Dioxide (CO2) 26 21 - 31 mmol/L    Anion Gap 10 3 - 14 mmol/L    Urea Nitrogen 16 7 - 25 mg/dL    Creatinine 0.69 0.60 - 1.20 mg/dL    Calcium 9.8 8.6 - 10.3 mg/dL    Glucose 108 (H) 70 - 105 mg/dL    Alkaline Phosphatase 59 34 - 104 U/L    AST 31 13 - 39 U/L    ALT 42 7 - 52 U/L    Protein Total 7.3 6.4  - 8.9 g/dL    Albumin 4.5 3.5 - 5.7 g/dL    Bilirubin Total 0.4 0.3 - 1.0 mg/dL    GFR Estimate >90 >60 mL/min/1.73m2   HEMOGLOBIN A1C -(Today)     Status: Normal   Result Value Ref Range    Hemoglobin A1C 6.1 4.0 - 6.2 %   TSH with free T4 reflex     Status: Normal   Result Value Ref Range    TSH 1.37 0.40 - 4.00 mU/L   CBC with platelets and differential     Status: None   Result Value Ref Range    WBC Count 6.0 4.0 - 11.0 10e3/uL    RBC Count 4.53 3.80 - 5.20 10e6/uL    Hemoglobin 13.9 11.7 - 15.7 g/dL    Hematocrit 41.0 35.0 - 47.0 %    MCV 91 78 - 100 fL    MCH 30.7 26.5 - 33.0 pg    MCHC 33.9 31.5 - 36.5 g/dL    RDW 14.1 10.0 - 15.0 %    Platelet Count 243 150 - 450 10e3/uL    % Neutrophils 55 %    % Lymphocytes 32 %    % Monocytes 10 %    % Eosinophils 2 %    % Basophils 1 %    % Immature Granulocytes 0 %    NRBCs per 100 WBC 0 <1 /100    Absolute Neutrophils 3.3 1.6 - 8.3 10e3/uL    Absolute Lymphocytes 1.9 0.8 - 5.3 10e3/uL    Absolute Monocytes 0.6 0.0 - 1.3 10e3/uL    Absolute Eosinophils 0.1 0.0 - 0.7 10e3/uL    Absolute Basophils 0.1 0.0 - 0.2 10e3/uL    Absolute Immature Granulocytes 0.0 <=0.0 10e3/uL    Absolute NRBCs 0.0 10e3/uL   Lipid Profile     Status: Abnormal   Result Value Ref Range    Cholesterol 212 (H) <200 mg/dL    Triglycerides 139 <150 mg/dL    Direct Measure HDL 68 23 - 92 mg/dL    LDL Cholesterol Calculated 116 (H) <=100 mg/dL    Non HDL Cholesterol 144 (H) <130 mg/dL    Patient Fasting > 8hrs? Unknown         ASSESSMENT AND PLAN:    Encounter for annual physical exam  Recommended age-appropriate screenings and lab studies ordered.  - CBC with Platelets & Differential unremarkable  - Comprehensive metabolic panel unremarkable with exception of slightly elevated glucose at 108  - HEMOGLOBIN A1C -(Today) in normal range at 6.1  - TSH with free T4 reflex in normal range    Encounter for screening mammogram for breast cancer  Ordered for today.  - MA Screen Bilateral w/Fili    Mixed  hyperlipidemia  - Lipid Profile, cholesterol elevated slightly at 212, triglycerides are normal range at 139, HDL in adequate range at 68, LDL slightly elevated at 116.  Encouraged to work on diet improvement and daily exercise to help decrease these numbers.  Continue on rosuvastatin.  Recheck in 3 months.    Mild persistent asthma without complication  Well managed with montelukast, albuterol inhaler as needed.  Refill given today    Prediabetes  - Weight management plan: Discussed healthy diet and exercise guidelines with patient at today's visit.  If you are interested, I could arrange for you to see our dietician to discuss dietary changes that may help as well. Please just call if you would like to do this.     Leukocytosis, unspecified type  Resolved    Mosaic Lino syndrome  Chronic    Situational anxiety  Chronic    Gastroesophageal reflux disease without esophagitis  Managed with diet and omeprazole daily.    High risk medication use    Elevated liver enzymes  Resolved.    DDD (degenerative disc disease), lumbar  Chronic.    Primary osteoarthritis involving multiple joints  Chronic.     Lesion on finger  Lesion poked with 18-gauge needle and wound culture obtained.  Clear gel consistency fluid removed.  Will notify patient when results are back.    I explained my diagnostic considerations and recommendations to the patient, who voiced understanding and agreement with the treatment plan. All questions were answered. We discussed potential side effects of any prescribed or recommended therapies, as well as expectations for response to treatments.    Patient was advised to allow up to 2 days for response on lab results via MyChart and or letter to be sent.    FOLLOW-UP:    Return in about 3 months (around 11/6/2021) for Recommend lab Work for recheck of lipid profile.     Clinic : [phone]  Appointment line: [phone]     MAKSIM Ley, ERIN-C  Internal Medicine  08/06/2021 10:11  AM  _____________________________________________________________________________________    Total time spent with this patient was 40 minutes which included chart review, visualization and interpretation of labs and/or images, time spent with patient, and documentation.

## 2021-08-07 ASSESSMENT — ANXIETY QUESTIONNAIRES: GAD7 TOTAL SCORE: 4

## 2021-08-08 LAB — BACTERIA FLD CULT: NO GROWTH

## 2021-08-17 ENCOUNTER — PATIENT OUTREACH (OUTPATIENT)
Dept: INTERNAL MEDICINE | Facility: OTHER | Age: 61
End: 2021-08-17

## 2021-08-17 NOTE — TELEPHONE ENCOUNTER
Patient Quality Outreach      Summary:    Patient has the following on her problem list/HM: None    Patient is due/failing the following:   Breast Cancer Screening - Mammogram    Type of outreach:    Sent [employer] message.    Questions for provider review:    None                                                                                                                                     Radha Inman LPN .......8/17/2021 11:12 AM      Chart routed to .

## 2021-10-03 ENCOUNTER — HEALTH MAINTENANCE LETTER (OUTPATIENT)
Age: 61
End: 2021-10-03

## 2022-05-09 ASSESSMENT — ANXIETY QUESTIONNAIRES
7. FEELING AFRAID AS IF SOMETHING AWFUL MIGHT HAPPEN: NOT AT ALL
GAD7 TOTAL SCORE: 0
6. BECOMING EASILY ANNOYED OR IRRITABLE: NOT AT ALL
2. NOT BEING ABLE TO STOP OR CONTROL WORRYING: NOT AT ALL
1. FEELING NERVOUS, ANXIOUS, OR ON EDGE: NOT AT ALL
5. BEING SO RESTLESS THAT IT IS HARD TO SIT STILL: NOT AT ALL
3. WORRYING TOO MUCH ABOUT DIFFERENT THINGS: NOT AT ALL
IF YOU CHECKED OFF ANY PROBLEMS ON THIS QUESTIONNAIRE, HOW DIFFICULT HAVE THESE PROBLEMS MADE IT FOR YOU TO DO YOUR WORK, TAKE CARE OF THINGS AT HOME, OR GET ALONG WITH OTHER PEOPLE: NOT DIFFICULT AT ALL

## 2022-05-09 ASSESSMENT — PATIENT HEALTH QUESTIONNAIRE - PHQ9: 5. POOR APPETITE OR OVEREATING: NOT AT ALL

## 2022-05-12 ENCOUNTER — OFFICE VISIT (OUTPATIENT)
Dept: INTERNAL MEDICINE | Facility: OTHER | Age: 62
End: 2022-05-12
Attending: INTERNAL MEDICINE
Payer: COMMERCIAL

## 2022-05-12 VITALS
OXYGEN SATURATION: 95 % | SYSTOLIC BLOOD PRESSURE: 124 MMHG | RESPIRATION RATE: 16 BRPM | WEIGHT: 188 LBS | HEART RATE: 90 BPM | BODY MASS INDEX: 34.95 KG/M2 | DIASTOLIC BLOOD PRESSURE: 72 MMHG | TEMPERATURE: 99 F

## 2022-05-12 DIAGNOSIS — R74.8 ELEVATED LIVER ENZYMES: ICD-10-CM

## 2022-05-12 DIAGNOSIS — D72.829 LEUKOCYTOSIS, UNSPECIFIED TYPE: ICD-10-CM

## 2022-05-12 DIAGNOSIS — I51.7 CARDIOMEGALY: ICD-10-CM

## 2022-05-12 DIAGNOSIS — E61.1 LOW IRON: ICD-10-CM

## 2022-05-12 DIAGNOSIS — Z13.1 SCREENING FOR DIABETES MELLITUS: ICD-10-CM

## 2022-05-12 DIAGNOSIS — Q96.3 MOSAIC TURNER SYNDROME: ICD-10-CM

## 2022-05-12 DIAGNOSIS — Z12.11 SCREENING FOR COLON CANCER: ICD-10-CM

## 2022-05-12 DIAGNOSIS — K21.9 GASTROESOPHAGEAL REFLUX DISEASE WITHOUT ESOPHAGITIS: ICD-10-CM

## 2022-05-12 DIAGNOSIS — E78.2 MIXED HYPERLIPIDEMIA: ICD-10-CM

## 2022-05-12 DIAGNOSIS — R06.09 DOE (DYSPNEA ON EXERTION): Primary | ICD-10-CM

## 2022-05-12 DIAGNOSIS — J45.30 MILD PERSISTENT ASTHMA WITHOUT COMPLICATION: ICD-10-CM

## 2022-05-12 LAB
ALBUMIN SERPL-MCNC: 4.4 G/DL (ref 3.5–5.7)
ALP SERPL-CCNC: 71 U/L (ref 34–104)
ALT SERPL W P-5'-P-CCNC: 80 U/L (ref 7–52)
ANION GAP SERPL CALCULATED.3IONS-SCNC: 9 MMOL/L (ref 3–14)
AST SERPL W P-5'-P-CCNC: 57 U/L (ref 13–39)
BILIRUB SERPL-MCNC: 0.3 MG/DL (ref 0.3–1)
BUN SERPL-MCNC: 18 MG/DL (ref 7–25)
CALCIUM SERPL-MCNC: 9.8 MG/DL (ref 8.6–10.3)
CHLORIDE BLD-SCNC: 102 MMOL/L (ref 98–107)
CHOLEST SERPL-MCNC: 221 MG/DL
CO2 SERPL-SCNC: 26 MMOL/L (ref 21–31)
CREAT SERPL-MCNC: 0.74 MG/DL (ref 0.6–1.2)
D DIMER PPP FEU-MCNC: 0.32 UG/ML FEU (ref 0–0.5)
DEPRECATED CALCIDIOL+CALCIFEROL SERPL-MC: 30 UG/L (ref 30–100)
ERYTHROCYTE [DISTWIDTH] IN BLOOD BY AUTOMATED COUNT: 13.4 % (ref 10–15)
FASTING STATUS PATIENT QL REPORTED: NO
FERRITIN SERPL-MCNC: 110 NG/ML (ref 24–336)
GFR SERPL CREATININE-BSD FRML MDRD: 90 ML/MIN/1.73M2
GLUCOSE BLD-MCNC: 104 MG/DL (ref 70–105)
HCT VFR BLD AUTO: 42.1 % (ref 35–47)
HDLC SERPL-MCNC: 67 MG/DL (ref 23–92)
HGB BLD-MCNC: 14.3 G/DL (ref 11.7–15.7)
IRON SATN MFR SERPL: 12 % (ref 20–55)
IRON SERPL-MCNC: 46 UG/DL (ref 50–212)
LDLC SERPL CALC-MCNC: 124 MG/DL
MAGNESIUM SERPL-MCNC: 1.8 MG/DL (ref 1.9–2.7)
MCH RBC QN AUTO: 30.9 PG (ref 26.5–33)
MCHC RBC AUTO-ENTMCNC: 34 G/DL (ref 31.5–36.5)
MCV RBC AUTO: 91 FL (ref 78–100)
NONHDLC SERPL-MCNC: 154 MG/DL
PLATELET # BLD AUTO: 254 10E3/UL (ref 150–450)
POTASSIUM BLD-SCNC: 4.2 MMOL/L (ref 3.5–5.1)
PROT SERPL-MCNC: 7.4 G/DL (ref 6.4–8.9)
RBC # BLD AUTO: 4.63 10E6/UL (ref 3.8–5.2)
SODIUM SERPL-SCNC: 137 MMOL/L (ref 134–144)
T4 FREE SERPL-MCNC: 0.76 NG/DL (ref 0.6–1.6)
TIBC SERPL-MCNC: 387.8 UG/DL (ref 245–400)
TRANSFERRIN SERPL-MCNC: 277 MG/DL (ref 203–362)
TRIGL SERPL-MCNC: 150 MG/DL
TROPONIN I SERPL-MCNC: 2.6 PG/ML (ref 0–34)
TSH SERPL DL<=0.005 MIU/L-ACNC: 1.65 MU/L (ref 0.4–4)
UIBC (UNSATURATED): 341.8 MG/DL
VIT B12 SERPL-MCNC: 417 PG/ML (ref 180–914)
WBC # BLD AUTO: 8.2 10E3/UL (ref 4–11)

## 2022-05-12 PROCEDURE — 93000 ELECTROCARDIOGRAM COMPLETE: CPT | Performed by: INTERNAL MEDICINE

## 2022-05-12 PROCEDURE — 82040 ASSAY OF SERUM ALBUMIN: CPT | Mod: ZL | Performed by: INTERNAL MEDICINE

## 2022-05-12 PROCEDURE — 99214 OFFICE O/P EST MOD 30 MIN: CPT | Performed by: INTERNAL MEDICINE

## 2022-05-12 PROCEDURE — 84443 ASSAY THYROID STIM HORMONE: CPT | Mod: ZL | Performed by: INTERNAL MEDICINE

## 2022-05-12 PROCEDURE — 85027 COMPLETE CBC AUTOMATED: CPT | Mod: ZL | Performed by: INTERNAL MEDICINE

## 2022-05-12 PROCEDURE — 84484 ASSAY OF TROPONIN QUANT: CPT | Mod: ZL | Performed by: INTERNAL MEDICINE

## 2022-05-12 PROCEDURE — 82306 VITAMIN D 25 HYDROXY: CPT | Mod: ZL | Performed by: INTERNAL MEDICINE

## 2022-05-12 PROCEDURE — 84481 FREE ASSAY (FT-3): CPT | Mod: ZL | Performed by: INTERNAL MEDICINE

## 2022-05-12 PROCEDURE — 83735 ASSAY OF MAGNESIUM: CPT | Mod: ZL | Performed by: INTERNAL MEDICINE

## 2022-05-12 PROCEDURE — 82728 ASSAY OF FERRITIN: CPT | Mod: ZL | Performed by: INTERNAL MEDICINE

## 2022-05-12 PROCEDURE — 80053 COMPREHEN METABOLIC PANEL: CPT | Mod: ZL | Performed by: INTERNAL MEDICINE

## 2022-05-12 PROCEDURE — 82607 VITAMIN B-12: CPT | Mod: ZL | Performed by: INTERNAL MEDICINE

## 2022-05-12 PROCEDURE — 83550 IRON BINDING TEST: CPT | Mod: ZL | Performed by: INTERNAL MEDICINE

## 2022-05-12 PROCEDURE — 85379 FIBRIN DEGRADATION QUANT: CPT | Mod: ZL | Performed by: INTERNAL MEDICINE

## 2022-05-12 PROCEDURE — 84439 ASSAY OF FREE THYROXINE: CPT | Mod: ZL | Performed by: INTERNAL MEDICINE

## 2022-05-12 PROCEDURE — 80061 LIPID PANEL: CPT | Mod: ZL | Performed by: INTERNAL MEDICINE

## 2022-05-12 PROCEDURE — 36415 COLL VENOUS BLD VENIPUNCTURE: CPT | Mod: ZL | Performed by: INTERNAL MEDICINE

## 2022-05-12 RX ORDER — CETIRIZINE HYDROCHLORIDE 10 MG/1
10 TABLET ORAL DAILY
COMMUNITY

## 2022-05-12 ASSESSMENT — ASTHMA QUESTIONNAIRES: ACT_TOTALSCORE: 19

## 2022-05-12 NOTE — PROGRESS NOTES
Assessment & Plan     ZARATE (dyspnea on exertion)  -Exact etiology of her dyspnea on exertion is unknown.  The EKG today shows normal sinus rhythm with a ventricular rate of 80 per my interpretation.  Labs are obtained and overall look good other than mild abnormalities.  See below for additional information  - EKG 12-lead, tracing only  - Troponin I  - D dimer quantitative  - Vitamin D Total  - Vitamin B12  - Ferritin  - Iron Binding Panel  - T3, Free  - T4, Free  - TSH  - Echocardiogram Complete; Future    Screening for colon cancer  - Adult Gastro Ref - Procedure Only; Future    Screening for diabetes mellitus  - Comprehensive metabolic panel    Mixed hyperlipidemia  - slightly high, work on reducing fatty, fried, cheesy, and high carb/sugar foods and we will recheck fasting in 6-12 months.  - Lipid Profile    Leukocytosis, unspecified type  - WBC normalized  - CBC with platelets    Mild persistent asthma without complication  If testing is all negative, send in Advair    Mosaic Lino syndrome  - obtain echo  - Echocardiogram Complete; Future    Gastroesophageal reflux disease without esophagitis  - If GERD   - Magnesium slightly low, Increase dietary Magnesium with spinach/leafy greens, beans/legumes, nuts/seeds, whole grains, tofu, avocados, and bananas.  - Magnesium    Elevated liver enzymes   - likely due to underlying fat depositing in the liver; stop all alcohol intake; reduce fatty, fried, cheesy, and high carb/sugar foods and we will recheck fasting in 6-8 weeks with a lab only appointment. If they remain elevated, I would recommend we set up a liver ultrasound for you.  - Hepatic Function Panel; Future    Low iron   - just slightly low, start Iron sulfate 325mg daily which I will send in.  We will recheck in 6-8 weeks.  If it remains low, we can talk about adding a stomach scope to your colonoscopy in August.  - Iron Binding Panel; Future  - Hemoglobin; Future    Cardiomegaly  Regarding the  "Cardiomegaly, it was seen on a chest xray in 2017 however then in 2018 you had a CT of the chest that showed a normal sized heart.  I suspect this finding on the Xray is due to the xray itself however all that said, given your underlying Turners, it would be reasonable to get an Echocardiogram of the heart to look at it closer now.  I have placed this order and they should be calling to schedule.      BMI:   Estimated body mass index is 34.95 kg/m  as calculated from the following:    Height as of 8/6/21: 1.562 m (5' 1.5\").    Weight as of this encounter: 85.3 kg (188 lb).   Weight management plan: Discussed healthy diet and exercise guidelines    Return in about 3 months (around 8/12/2022) for Annual Review with renewal of all medications.    32 minutes spent on the date of the encounter doing chart review, history and exam, documentation and further activities as noted above    Noris Medellin, [facility] AND [facility]      Josue Jordan is a 62 year old who presents for the following health issues:    She is overdue for her annual px.      She has been having some fatigue along with weight gain.    She does have some concern about cardiomegaly that was seen on prior imaging.    She has some breathing issues as below.  She has been using albuterol as needed.  She has had increased wheezing and shortness of breath.    History of Present Illness     Asthma:  She presents for follow up of asthma.  She has some cough, some wheezing, and some shortness of breath. She is using a relief medication daily. She does not miss any doses of her controller medication throughout the week.Patient is aware of the following triggers: unaware of any triggers. The patient has not had a visit to the Emergency Room, Urgent Care or Hospital due to asthma since the last clinic visit.     Hypothyroidism:     Since last visit, patient describes the following symptoms::  Anxiety, Fatigue and Loose stools    Reason for visit: "  Schedule followup colonoscopy, discuss issues with Fatigue and Asthma  Symptom onset:  More than a month  Symptoms include:  Fatigue. I have been walking 2-3 miles several times a week to improve stamina and overall health. Very tired. Asthma worsening.  Symptom intensity:  Moderate  Symptom progression:  Worsening    She eats 2-3 servings of fruits and vegetables daily.She consumes 1 sweetened beverage(s) daily.She exercises with enough effort to increase her heart rate 30 to 60 minutes per day.  She exercises with enough effort to increase her heart rate 4 days per week.   She is taking medications regularly.     Review of Systems   Denies any fevers, chills, SOB, chest pain, increased lower extremity edema, changes in bowel or bladder or other concerns.       Objective    /72 (BP Location: Right arm, Patient Position: Sitting, Cuff Size: Adult Large)   Pulse 90   Temp 99  F (37.2  C) (Tympanic)   Resp 16   Wt 85.3 kg (188 lb)   LMP  (LMP Unknown)   SpO2 95%   BMI 34.95 kg/m    Body mass index is 34.95 kg/m .  Physical Exam   GEN: Vitals reviewed. Healthy appearing. Patient is in no acute distress. Cooperative with exam.  HEENT: Normocephalic atraumatic.  Eyes grossly normal to inspection.  No discharge or erythema, or obvious scleral/conjunctival abnormalities.   NECK: Supple; no thyromegaly or masses noted.  No cervical or supraclavicular lymphadenopathy.  CV: Heart regular in rate and rhythm with no murmur.    LUNGS: No audible wheeze, cough, or visible cyanosis.  No visible retractions or increased work of breathing.  Lungs clear to auscultation bilaterally.    ABD:  Obese, nondistended  SKIN: Warm and dry to touch.  Visible skin clear. No significant rash, abnormal pigmentation or lesions.  EXT: No clubbing or cyanosis.  No peripheral edema.

## 2022-05-12 NOTE — NURSING NOTE
Patient presents to clinic today for follow up on thyroid, asthma and arthritis. Also will be setting up colonoscopy soon.  Medication reconciliation completed.    ACP on file? No, form previously provided.     FOOD SECURITY SCREENING QUESTIONS    The next two questions are to help us understand your food security.  If you are feeling you need any assistance in this area, we have resources available to support you today.    Hunger Vital Signs:  Within the past 12 months we worried whether our food would run out before we got money to buy more. Never  Within the past 12 months the food we bought just didn't last and we didn't have money to get more. Never    Magali Schultz CMA(AAMA)..................5/12/2022   3:56 PM

## 2022-05-13 LAB
ATRIAL RATE - MUSE: 80 BPM
DIASTOLIC BLOOD PRESSURE - MUSE: NORMAL MMHG
INTERPRETATION ECG - MUSE: NORMAL
P AXIS - MUSE: 22 DEGREES
PR INTERVAL - MUSE: 112 MS
QRS DURATION - MUSE: 88 MS
QT - MUSE: 394 MS
QTC - MUSE: 454 MS
R AXIS - MUSE: -25 DEGREES
SYSTOLIC BLOOD PRESSURE - MUSE: NORMAL MMHG
T AXIS - MUSE: 16 DEGREES
VENTRICULAR RATE- MUSE: 80 BPM

## 2022-05-13 ASSESSMENT — ANXIETY QUESTIONNAIRES: GAD7 TOTAL SCORE: 0

## 2022-05-14 LAB — T3FREE SERPL-MCNC: 3 PG/ML (ref 2.3–4.2)

## 2022-05-18 ENCOUNTER — MYC MEDICAL ADVICE (OUTPATIENT)
Dept: INTERNAL MEDICINE | Facility: OTHER | Age: 62
End: 2022-05-18
Payer: COMMERCIAL

## 2022-05-19 DIAGNOSIS — Z01.818 PRE-OP TESTING: Primary | ICD-10-CM

## 2022-05-19 DIAGNOSIS — Z12.11 SPECIAL SCREENING FOR MALIGNANT NEOPLASMS, COLON: ICD-10-CM

## 2022-05-19 NOTE — TELEPHONE ENCOUNTER
Screening Questions for the Scheduling of Screening Colonoscopies   (If Colonoscopy is diagnostic, Provider should review the chart before scheduling.)  Are you younger than 50 or older than 80?  NO  Do you take aspirin or fish oil?  ASPIRIN AND KRILL OIL (if yes, tell patient to stop 1 week prior to Colonoscopy)  Do you take warfarin (Coumadin), clopidogrel (Plavix), apixaban (Eliquis), dabigatram (Pradaxa), rivaroxaban (Xarelto) or any blood thinner? NO  Do you use oxygen at home?  NO  Do you have kidney disease? NO  Are you on dialysis? NO  Have you had a stroke or heart attack in the last year? NO  Have you had a stent in your heart or any blood vessel in the last year? O  Have you had a transplant of any organ? NO  Have you had a colonoscopy or upper endoscopy (EGD) before? YES         When?  06/09/2021  Date of scheduled Colonoscopy. 08/10/2022  Provider [facility]

## 2022-05-19 NOTE — TELEPHONE ENCOUNTER
Colon/EGD  will call her.   Magali Schultz CMA([facility])..................5/19/2022   9:21 AM

## 2022-05-19 NOTE — TELEPHONE ENCOUNTER
Left message with colonoscopy  to see if they are in the process of getting her scheduled yet.  Magali Schultz CMA([facility])..................5/19/2022   9:12 AM

## 2022-05-20 RX ORDER — BISACODYL 5 MG/1
TABLET, DELAYED RELEASE ORAL
Qty: 2 TABLET | Refills: 0 | Status: ON HOLD | OUTPATIENT
Start: 2022-05-20 | End: 2022-08-10

## 2022-05-20 RX ORDER — POLYETHYLENE GLYCOL 3350, SODIUM CHLORIDE, SODIUM BICARBONATE, POTASSIUM CHLORIDE 420; 11.2; 5.72; 1.48 G/4L; G/4L; G/4L; G/4L
4000 POWDER, FOR SOLUTION ORAL ONCE
Qty: 4000 ML | Refills: 0 | Status: SHIPPED | OUTPATIENT
Start: 2022-05-20 | End: 2022-05-20

## 2022-06-30 ENCOUNTER — E-VISIT (OUTPATIENT)
Dept: INTERNAL MEDICINE | Facility: OTHER | Age: 62
End: 2022-06-30
Attending: INTERNAL MEDICINE
Payer: COMMERCIAL

## 2022-06-30 DIAGNOSIS — J20.9 ACUTE BRONCHITIS WITH SYMPTOMS > 10 DAYS: Primary | ICD-10-CM

## 2022-06-30 PROCEDURE — 99421 OL DIG E/M SVC 5-10 MIN: CPT | Performed by: INTERNAL MEDICINE

## 2022-06-30 RX ORDER — AZITHROMYCIN 250 MG/1
TABLET, FILM COATED ORAL
Qty: 6 TABLET | Refills: 0 | Status: SHIPPED | OUTPATIENT
Start: 2022-06-30 | End: 2022-07-05

## 2022-06-30 NOTE — PATIENT INSTRUCTIONS
"    Dear Nikki Lezama    After reviewing your responses, I've been able to diagnose you with \"Bronchitis\" which is a common infection of your lungs. While this is most commonly caused by a virus, the symptoms you have given suggest you should be treated with antibiotics.     I have sent Azithromycin to your pharmacy to treat this infection.     It is important that you take all of your prescribed medication even if your symptoms are improving after a few doses. Taking all of your medicine helps prevent the symptoms from returning.     If your symptoms worsen, you develop chest pain or shortness of breath, fevers over 101, or are not improving in 5 days, please contact your primary care provider for an appointment or visit any of our convenient Walk-in Care or Urgent Care Centers to be seen which can be found on our website here.    Thanks again for choosing us as your health care partner,    Noris Medellin, DO    "

## 2022-07-14 ENCOUNTER — HOSPITAL ENCOUNTER (OUTPATIENT)
Dept: CARDIOLOGY | Facility: OTHER | Age: 62
Discharge: HOME | End: 2022-07-14
Attending: INTERNAL MEDICINE | Admitting: INTERNAL MEDICINE
Payer: COMMERCIAL

## 2022-07-14 DIAGNOSIS — Q96.3 MOSAIC TURNER SYNDROME: ICD-10-CM

## 2022-07-14 DIAGNOSIS — R06.09 DOE (DYSPNEA ON EXERTION): ICD-10-CM

## 2022-07-14 LAB — LVEF ECHO: NORMAL

## 2022-07-14 PROCEDURE — 255N000002 HC RX 255 OP 636: Performed by: INTERNAL MEDICINE

## 2022-07-14 PROCEDURE — 93306 TTE W/DOPPLER COMPLETE: CPT | Mod: 26 | Performed by: INTERNAL MEDICINE

## 2022-07-14 PROCEDURE — 999N000208 ECHOCARDIOGRAM COMPLETE

## 2022-07-14 RX ADMIN — PERFLUTREN 3 ML: 6.52 INJECTION, SUSPENSION INTRAVENOUS at 11:34

## 2022-08-10 ENCOUNTER — ANESTHESIA EVENT (OUTPATIENT)
Dept: SURGERY | Facility: OTHER | Age: 62
End: 2022-08-10
Payer: COMMERCIAL

## 2022-08-10 ENCOUNTER — HOSPITAL ENCOUNTER (OUTPATIENT)
Facility: OTHER | Age: 62
Discharge: HOME | End: 2022-08-10
Attending: SURGERY | Admitting: SURGERY
Payer: COMMERCIAL

## 2022-08-10 ENCOUNTER — ANESTHESIA (OUTPATIENT)
Dept: SURGERY | Facility: OTHER | Age: 62
End: 2022-08-10
Payer: COMMERCIAL

## 2022-08-10 VITALS
TEMPERATURE: 98 F | WEIGHT: 188 LBS | BODY MASS INDEX: 35.5 KG/M2 | HEIGHT: 61 IN | HEART RATE: 80 BPM | RESPIRATION RATE: 16 BRPM | DIASTOLIC BLOOD PRESSURE: 77 MMHG | SYSTOLIC BLOOD PRESSURE: 113 MMHG | OXYGEN SATURATION: 97 %

## 2022-08-10 DIAGNOSIS — K57.30 DIVERTICULOSIS OF COLON WITHOUT DIVERTICULITIS: Primary | ICD-10-CM

## 2022-08-10 PROCEDURE — 250N000009 HC RX 250: Performed by: SURGERY

## 2022-08-10 PROCEDURE — 250N000009 HC RX 250: Performed by: NURSE ANESTHETIST, CERTIFIED REGISTERED

## 2022-08-10 PROCEDURE — G0121 COLON CA SCRN NOT HI RSK IND: HCPCS | Performed by: SURGERY

## 2022-08-10 PROCEDURE — 250N000011 HC RX IP 250 OP 636: Performed by: NURSE ANESTHETIST, CERTIFIED REGISTERED

## 2022-08-10 PROCEDURE — 45378 DIAGNOSTIC COLONOSCOPY: CPT | Performed by: SURGERY

## 2022-08-10 PROCEDURE — 258N000003 HC RX IP 258 OP 636: Performed by: SURGERY

## 2022-08-10 PROCEDURE — 45378 DIAGNOSTIC COLONOSCOPY: CPT | Performed by: NURSE ANESTHETIST, CERTIFIED REGISTERED

## 2022-08-10 RX ORDER — FLUMAZENIL 0.1 MG/ML
0.2 INJECTION, SOLUTION INTRAVENOUS
Status: DISCONTINUED | OUTPATIENT
Start: 2022-08-10 | End: 2022-08-10 | Stop reason: HOSPADM

## 2022-08-10 RX ORDER — SODIUM CHLORIDE, SODIUM LACTATE, POTASSIUM CHLORIDE, CALCIUM CHLORIDE 600; 310; 30; 20 MG/100ML; MG/100ML; MG/100ML; MG/100ML
INJECTION, SOLUTION INTRAVENOUS CONTINUOUS
Status: DISCONTINUED | OUTPATIENT
Start: 2022-08-10 | End: 2022-08-10 | Stop reason: HOSPADM

## 2022-08-10 RX ORDER — ONDANSETRON 2 MG/ML
4 INJECTION INTRAMUSCULAR; INTRAVENOUS EVERY 6 HOURS PRN
Status: DISCONTINUED | OUTPATIENT
Start: 2022-08-10 | End: 2022-08-10 | Stop reason: HOSPADM

## 2022-08-10 RX ORDER — NALOXONE HYDROCHLORIDE 0.4 MG/ML
0.4 INJECTION, SOLUTION INTRAMUSCULAR; INTRAVENOUS; SUBCUTANEOUS
Status: DISCONTINUED | OUTPATIENT
Start: 2022-08-10 | End: 2022-08-10 | Stop reason: HOSPADM

## 2022-08-10 RX ORDER — LIDOCAINE HYDROCHLORIDE 20 MG/ML
INJECTION, SOLUTION INFILTRATION; PERINEURAL PRN
Status: DISCONTINUED | OUTPATIENT
Start: 2022-08-10 | End: 2022-08-10

## 2022-08-10 RX ORDER — ONDANSETRON 4 MG/1
4 TABLET, ORALLY DISINTEGRATING ORAL EVERY 6 HOURS PRN
Status: DISCONTINUED | OUTPATIENT
Start: 2022-08-10 | End: 2022-08-10 | Stop reason: HOSPADM

## 2022-08-10 RX ORDER — PROPOFOL 10 MG/ML
INJECTION, EMULSION INTRAVENOUS CONTINUOUS PRN
Status: DISCONTINUED | OUTPATIENT
Start: 2022-08-10 | End: 2022-08-10

## 2022-08-10 RX ORDER — NALOXONE HYDROCHLORIDE 0.4 MG/ML
0.2 INJECTION, SOLUTION INTRAMUSCULAR; INTRAVENOUS; SUBCUTANEOUS
Status: DISCONTINUED | OUTPATIENT
Start: 2022-08-10 | End: 2022-08-10 | Stop reason: HOSPADM

## 2022-08-10 RX ORDER — PROCHLORPERAZINE MALEATE 5 MG
10 TABLET ORAL EVERY 6 HOURS PRN
Status: DISCONTINUED | OUTPATIENT
Start: 2022-08-10 | End: 2022-08-10 | Stop reason: HOSPADM

## 2022-08-10 RX ORDER — LIDOCAINE 40 MG/G
CREAM TOPICAL
Status: DISCONTINUED | OUTPATIENT
Start: 2022-08-10 | End: 2022-08-10 | Stop reason: HOSPADM

## 2022-08-10 RX ORDER — PROPOFOL 10 MG/ML
INJECTION, EMULSION INTRAVENOUS PRN
Status: DISCONTINUED | OUTPATIENT
Start: 2022-08-10 | End: 2022-08-10

## 2022-08-10 RX ORDER — ONDANSETRON 2 MG/ML
4 INJECTION INTRAMUSCULAR; INTRAVENOUS
Status: DISCONTINUED | OUTPATIENT
Start: 2022-08-10 | End: 2022-08-10 | Stop reason: HOSPADM

## 2022-08-10 RX ADMIN — PROPOFOL 50 MG: 10 INJECTION, EMULSION INTRAVENOUS at 08:52

## 2022-08-10 RX ADMIN — SODIUM CHLORIDE, POTASSIUM CHLORIDE, SODIUM LACTATE AND CALCIUM CHLORIDE 30 ML/HR: 600; 310; 30; 20 INJECTION, SOLUTION INTRAVENOUS at 08:23

## 2022-08-10 RX ADMIN — PROPOFOL 100 MG: 10 INJECTION, EMULSION INTRAVENOUS at 08:50

## 2022-08-10 RX ADMIN — LIDOCAINE HYDROCHLORIDE 0.1 ML: 10 INJECTION, SOLUTION EPIDURAL; INFILTRATION; INTRACAUDAL; PERINEURAL at 08:23

## 2022-08-10 RX ADMIN — PROPOFOL 150 MCG/KG/MIN: 10 INJECTION, EMULSION INTRAVENOUS at 08:50

## 2022-08-10 RX ADMIN — LIDOCAINE HYDROCHLORIDE 40 MG: 20 INJECTION, SOLUTION INFILTRATION; PERINEURAL at 08:50

## 2022-08-10 ASSESSMENT — ACTIVITIES OF DAILY LIVING (ADL)
ADLS_ACUITY_SCORE: 35
ADLS_ACUITY_SCORE: 35

## 2022-08-10 NOTE — ANESTHESIA CARE TRANSFER NOTE
Patient: Nikki Lezama    Procedure: Procedure(s):  COLONOSCOPY       Diagnosis: History of colon polyps [Z86.010]  Diagnosis Additional Information: No value filed.    Anesthesia Type:   MAC     Note:    Oropharynx: oropharynx clear of all foreign objects  Level of Consciousness: drowsy  Oxygen Supplementation: room air    Independent Airway: airway patency satisfactory and stable    Vital Signs Stable: post-procedure vital signs reviewed and stable  Report to RN Given: handoff report given  Patient transferred to: Phase II    Handoff Report: Identifed the Patient, Identified the Reponsible Provider, Reviewed the pertinent medical history, Discussed the surgical course, Reviewed Intra-OP anesthesia mangement and issues during anesthesia, Set expectations for post-procedure period and Allowed opportunity for questions and acknowledgement of understanding      Vitals:  Vitals Value Taken Time   BP     Temp     Pulse     Resp     SpO2 91 % 08/10/22 0913   Vitals shown include unvalidated device data.    Electronically Signed By: MAKSIM Solis CRNA  August 10, 2022  9:14 AM

## 2022-08-10 NOTE — ANESTHESIA PREPROCEDURE EVALUATION
Anesthesia Pre-Procedure Evaluation    Patient: Nikki Lezama   MRN: [id number] : 1960        Procedure : Procedure(s):  COLONOSCOPY          Past Medical History:   Diagnosis Date     Allergic rhinitis 2007     Amblyopia of eye     left eye; patching and vision therapy.     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      Past Surgical History:   Procedure Laterality Date     BIOPSY BREAST      ,,benign     COLONOSCOPY  2010    hemorrhoids o/w nl to TI;rep 10yrs     COLONOSCOPY N/A 2021    serrated adenomas - f/u 22     MAMMOPLASTY AUGMENTATION       OOPHORECTOMY      streak ovaries     OTHER SURGICAL HISTORY      LIPOSUCTION     TONSILLECTOMY        Allergies   Allergen Reactions     Cats      Other reaction(s): Runny Nose     Dust Mites Other (See Comments) and Itching     Sneezing, watery eyes     Ragweeds      Runny Nose; Sneezing, watery eyes      Social History     Tobacco Use     Smoking status: Never Smoker     Smokeless tobacco: Never Used   Substance Use Topics     Alcohol use: Yes     Alcohol/week: 0.0 standard drinks     Comment: 3 per week      Wt Readings from Last 1 Encounters:   08/10/22 85.3 kg (188 lb)        Anesthesia Evaluation   Pt has had prior anesthetic. Type: General.        ROS/MED HX  ENT/Pulmonary:     (+) Mild Persistent, asthma Treatment: Inhaler prn,      Neurologic:  - neg neurologic ROS     Cardiovascular:     (+) Dyslipidemia  hypertension-----Irregular Heartbeat/Palpitations,     METS/Exercise Tolerance: >4 METS Comment: Hx of SVT-on metoprolol. Has episodes a few times a year and treats at home with ice water to her face   Hematologic:  - neg hematologic  ROS     Musculoskeletal:  - neg musculoskeletal ROS     GI/Hepatic: Comment: Non alcoholic fatty liver disease  Diverticulitis in April    (+) GERD, Asymptomatic on medication, bowel prep, liver disease,     Renal/Genitourinary:  - neg Renal ROS     Endo:  - neg endo ROS     Psychiatric/Substance Use:     (+) psychiatric history anxiety     Infectious Disease:  - neg infectious disease ROS     Malignancy:  - neg malignancy ROS     Other:  - neg other ROS          Physical Exam    Airway  airway exam normal      Mallampati: I   TM distance: > 3 FB   Neck ROM: full   Mouth opening: > 3 cm    Respiratory Devices and Support         Dental  no notable dental history         Cardiovascular   cardiovascular exam normal       Rhythm and rate: regular and normal     Pulmonary   pulmonary exam normal        breath sounds clear to auscultation           OUTSIDE LABS:  CBC:   Lab Results   Component Value Date    WBC 8.2 05/12/2022    WBC 6.0 08/06/2021    HGB 14.3 05/12/2022    HGB 13.9 08/06/2021    HCT 42.1 05/12/2022    HCT 41.0 08/06/2021     05/12/2022     08/06/2021     BMP:   Lab Results   Component Value Date     05/12/2022     08/06/2021    POTASSIUM 4.2 05/12/2022    POTASSIUM 3.8 08/06/2021    CHLORIDE 102 05/12/2022    CHLORIDE 105 08/06/2021    CO2 26 05/12/2022    CO2 26 08/06/2021    BUN 18 05/12/2022    BUN 16 08/06/2021    CR 0.74 05/12/2022    CR 0.69 08/06/2021     05/12/2022     (H) 08/06/2021     COAGS: No results found for: PTT, INR, FIBR  POC: No results found for: BGM, HCG, HCGS  HEPATIC:   Lab Results   Component Value Date    ALBUMIN 4.4 05/12/2022    PROTTOTAL 7.4 05/12/2022    ALT 80 (H) 05/12/2022    AST 57 (H) 05/12/2022     ALKPHOS 71 05/12/2022    BILITOTAL 0.3 05/12/2022     OTHER:   Lab Results   Component Value Date    LACT 1.4 04/14/2021    A1C 6.1 08/06/2021    MALDONADO 9.8 05/12/2022    MAG 1.8 (L) 05/12/2022    LIPASE 29 09/12/2018    AMYLASE 18 (L) 09/12/2018    TSH 1.65 05/12/2022    T4 0.76 05/12/2022    CRP 0.2 09/12/2018    SED 8 06/21/2019       Anesthesia Plan    ASA Status:  2   NPO Status:  NPO Appropriate    Anesthesia Type: MAC.     - Reason for MAC: straight local not clinically adequate   Induction: Intravenous.   Maintenance: Balanced.        Consents    Anesthesia Plan(s) and associated risks, benefits, and realistic alternatives discussed. Questions answered and patient/representative(s) expressed understanding.    - Discussed:     - Discussed with:  Patient      - Extended Intubation/Ventilatory Support Discussed: No.      - Patient is DNR/DNI Status: No    Use of blood products discussed: No .     Postoperative Care            Comments:    Other Comments: Risks, benefits and alternatives discussed and would like to proceed. General anesthesia ok if indicated.             MAKSIM Lopez CRNA

## 2022-08-10 NOTE — DISCHARGE INSTRUCTIONS
Procedure you had done: colonoscopy  Your health care provider is:  Noris Medellin  Your surgeon is Dr. Herlinda Wilde.   Please call your health care provider or surgeon at [phone] if:    - you feel you are getting worse or having an increase in problems    - fever greater than 101 degrees  - increasing shortness of breath or chest pain  - any signs of infection (increasing redness, swelling, tenderness, warmth, change in appearance, or  increased drainage)  - blood in your urine or stool  - coughing or vomiting blood  - nausea (upset stomach) and vomiting and/or diarrhea that will not stop  - severe pain that is not relieved by medicine, rest or ice  You have had medications for sedation. Please be aware that this can cause drowsiness and impaired judgment for up to 24 hours after your procedure. Do not drive, operate power tools or drink alcohol for 24 hours.  If samples were taken-you will get a phone call and a letter with your results in the next 7-10 days. If you don't get results, please call and let us know!

## 2022-08-10 NOTE — H&P
PRE-PROCEDURE NOTE    CHIEF COMPLAINT / REASON FORPROCEDURE:  Need for screening colonoscopy.    PERTINENT HISTORY   Patient with no complaints. Previous colonoscopy 6/9/2021-serrated adenoma. No diarrhea, constipation, abdominal pain or rectal bleeding. No family history of colon polyps or colon cancer.  Past Medical History:   Diagnosis Date     Allergic rhinitis 01/12/2007     Amblyopia of eye     left eye; patching and vision therapy.     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     Past Surgical History:   Procedure Laterality Date     BIOPSY BREAST      1980,1982,benign     COLONOSCOPY  04/19/2010    hemorrhoids o/w nl to TI;rep 10yrs     COLONOSCOPY N/A 06/09/2021    serrated adenomas - f/u 6/9/22     MAMMOPLASTY AUGMENTATION       OOPHORECTOMY      streak ovaries     OTHER SURGICAL HISTORY      LIPOSUCTION     TONSILLECTOMY       Other:  None  Bleeding tendencies:  No    Relevant Family History:  none    Relevant Social History:  none    A relevant review of systems was performed and was Negative.    ALLERGIES/SENSITIVITIES:   Allergies   Allergen Reactions     Cats      Other reaction(s): Runny Nose     Dust Mites Other (See Comments) and Itching     Sneezing, watery eyes     Ragweeds      Runny Nose; Sneezing, watery eyes        CURRENTMEDICATIONS:    No current facility-administered medications on file prior to encounter.  albuterol (PROAIR HFA/PROVENTIL HFA/VENTOLIN HFA) 108 (90 Base) MCG/ACT  "inhaler, Inhale 2 puffs into the lungs every 4 hours as needed for shortness of breath / dyspnea  ALPRAZolam (XANAX) 0.5 MG tablet, Take 1 tablet (0.5 mg) by mouth 2 times daily as needed for anxiety (for flying) For flying  cetirizine (ZYRTEC) 10 MG tablet, Take 10 mg by mouth daily Alternates between Zyrtec, Claritin and Allegra  co-enzyme Q-10 100 MG CAPS capsule, Take 100 mg by mouth daily  fexofenadine (ALLEGRA) 180 MG tablet, Take 180 mg by mouth daily  latanoprost (XALATAN) 0.005 % ophthalmic solution,   Misc Natural Products (GLUCOSAMINE CHOND COMPLEX/MSM PO), Take 1 tablet by mouth daily  montelukast (SINGULAIR) 10 MG tablet, TAKE 1 TABLET BY MOUTH EVERY DAY  Multiple Minerals-Vitamins (CALCIUM-MAGNESIUM-ZINC-D3) TABS, Take 1 tablet by mouth daily   omeprazole (PRILOSEC) 20 MG DR capsule, Take 1 capsule (20 mg) by mouth every morning (before breakfast)  probiotic CAPS, Take 1 capsule by mouth daily  RA KRILL  MG CAPS, Take 1 tablet by mouth daily  rosuvastatin (CRESTOR) 10 MG tablet, TAKE 1 TABLET BY MOUTH EVERY DAY  Spacer/Aero-Holding Chambers (AEROCHAMBER MINI CHAMBER) HORACE, For home use.  metoprolol succinate ER (TOPROL-XL) 25 MG 24 hr tablet, TAKE 1 TABLET BY MOUTH EVERY DAY      Current Facility-Administered Medications   Medication     lactated ringers infusion     lidocaine (LMX4) cream     lidocaine 1 % 0.1-1 mL     ondansetron (ZOFRAN) injection 4 mg     sodium chloride (PF) 0.9% PF flush 3 mL     sodium chloride (PF) 0.9% PF flush 3 mL       PRE-SEDATION ASSESSMENT:    BP (!) 151/89   Pulse 109   Temp 98.2  F (36.8  C) (Tympanic)   Resp 10   Ht 1.549 m (5' 1\")   Wt 85.3 kg (188 lb)   LMP  (LMP Unknown)   SpO2 98%   BMI 35.52 kg/m    Lung Exam:  Normal  Heart Exam:  Normal    Comment(s):      IMPRESSION:  Need for screening colonoscopy.    PLAN:  I discussed screening colonoscopy with the patient.     "

## 2022-08-10 NOTE — OP NOTE
PROCEDURE NOTE    SURGEON:Herlinda Wilde MD.    PRE-OP DIAGNOSIS:  Screening Colonoscopy      POST-OP DIAGNOSIS: normal colon, scattered diverticula sigmoid colon    PROCEDURE:  Screening colonoscopy    ESTIMATED BLOODLOSS: none    COMPLICATIONS:  None    SPECIMEN:  none    ANESTHESIA:  See anesthesia note    INDICATION FOR THE PROCEDURE: The patient is a 62 year old female. The patient has no complaints. I explained to the patient the risks, benefits and alternatives to screening colonoscopy for evaluating the colon for colon polyps and colon cancer. We specifically discussed the risks of bleeding, infection, perforation, potential inability to reach the cecum and the risks of sedation. The patient's questions were answered and the patient wished to proceed. Informed consent paperwork was completed.    PROCEDURE: The patient was taken to the endoscopy suite. Appropriate monitors were attached. The patient was placed in the left lateral decubitus position.Timeout was performed confirming the patient's identity and procedure to be performed. After appropriate sedation was confirmed, digital rectal exam was performed. There was normal tone and no gross abnormality was noted. The lubricated colonoscope was introduced into the anus the colon was insufflated with air. The prep quality was adequate. Under direct visualization the scope was advanced to the cecum. The ileocecal valve was intubated and the terminal ileum inspected. No gross abnormality was noted. The scope was withdrawn back into the cecum. The mucosa of colon was inspected while withdrawing the scope. No abnormalities were noted other than scattered diverticula. The scope was retroflexed in the rectum and the anorectal junction was inspected. No abnormalities were noted. The scope was returned to a neutral position and the colon was decompressed. The scope was removed. The patient tolerated the procedure with no immediately apparent complication. The  patient was taken to recovery in stable condition.    FOLLOW UP:RECOMMEND high fiber diet, follow up: 5 year screening colonoscopy due to previous sessile serrated adenomas.

## 2022-08-10 NOTE — ANESTHESIA POSTPROCEDURE EVALUATION
Patient: Nikki Lezama    Procedure: Procedure(s):  COLONOSCOPY       Anesthesia Type:  MAC    Note:  Disposition: Outpatient   Postop Pain Control: Uneventful            Sign Out: Well controlled pain   PONV: No   Neuro/Psych: Uneventful            Sign Out: Acceptable/Baseline neuro status   Airway/Respiratory: Uneventful            Sign Out: Acceptable/Baseline resp. status   CV/Hemodynamics: Uneventful            Sign Out: Acceptable CV status; No obvious hypovolemia; No obvious fluid overload   Other NRE: NONE   DID A NON-ROUTINE EVENT OCCUR? No           Last vitals:  Vitals Value Taken Time   /77 08/10/22 0945   Temp     Pulse 80 08/10/22 0945   Resp     SpO2 97 % 08/10/22 0948   Vitals shown include unvalidated device data.    Electronically Signed By: MAKSIM Solis CRNA  August 10, 2022  10:42 AM

## 2022-09-11 ENCOUNTER — HEALTH MAINTENANCE LETTER (OUTPATIENT)
Age: 62
End: 2022-09-11

## 2022-10-07 ENCOUNTER — TELEPHONE (OUTPATIENT)
Dept: INTERNAL MEDICINE | Facility: OTHER | Age: 62
End: 2022-10-07

## 2022-10-07 DIAGNOSIS — Z12.39 ENCOUNTER FOR BREAST CANCER SCREENING USING NON-MAMMOGRAM MODALITY: ICD-10-CM

## 2022-10-07 DIAGNOSIS — T85.43XS BREAST IMPLANT RUPTURE, SEQUELA: ICD-10-CM

## 2022-10-07 DIAGNOSIS — Z12.31 ENCOUNTER FOR SCREENING MAMMOGRAM FOR BREAST CANCER: Primary | ICD-10-CM

## 2022-10-07 NOTE — TELEPHONE ENCOUNTER
The patient is calling about an MRI breast order.  She would like to schedule one.  Please advise.

## 2022-10-10 NOTE — TELEPHONE ENCOUNTER
"Just for routine screening. She says she has not had it done for many years but states \"Dr Medellin has ordered it a couple of times since then\", but she has just neglected to schedule it till now and the order is .   I see a previous order in 2018 by Nayana Shah PA-C: Ordered MRI breast for breast cancer screening with history of implants.   Magali Schultz CMA([facility]AMA)..................10/10/2022   1:46 PM   "

## 2022-10-11 NOTE — TELEPHONE ENCOUNTER
Patient is wondering if you would order the MRI for breast screening due to her having implants that have ruptured in the past versus the Mammorgram. Please advise.   Miley Verduzco LPN.......  10/11/2022  11:08 AM

## 2022-10-12 NOTE — TELEPHONE ENCOUNTER
Patient was left message to return call regarding placement of MRI order    Corinne R Thayer, RN on 10/12/2022 at 10:05 AM

## 2022-10-12 NOTE — TELEPHONE ENCOUNTER
Per routing comment:  I definitely can order an MRI however it may not be able to be done as a screening test.  Just so she is aware.  I will put in the order and she can decide whether to schedule it or not.   [facility]      After verifying name and birth date, patient was notified of provider's response.   Magali Schultz CMA([facility]).............10/12/2022  10:48 AM

## 2022-10-16 DIAGNOSIS — E78.2 MIXED HYPERLIPIDEMIA: ICD-10-CM

## 2022-10-16 DIAGNOSIS — Z86.79 HISTORY OF SUPRAVENTRICULAR TACHYCARDIA: ICD-10-CM

## 2022-10-16 DIAGNOSIS — J45.30 MILD PERSISTENT ASTHMA WITHOUT COMPLICATION: ICD-10-CM

## 2022-10-17 RX ORDER — ROSUVASTATIN CALCIUM 10 MG/1
TABLET, COATED ORAL
Qty: 90 TABLET | Refills: 1 | Status: SHIPPED | OUTPATIENT
Start: 2022-10-17 | End: 2023-04-20

## 2022-10-17 RX ORDER — METOPROLOL SUCCINATE 25 MG/1
TABLET, EXTENDED RELEASE ORAL
Qty: 90 TABLET | Refills: 1 | Status: SHIPPED | OUTPATIENT
Start: 2022-10-17 | End: 2023-04-20

## 2022-10-17 RX ORDER — MONTELUKAST SODIUM 10 MG/1
TABLET ORAL
Qty: 90 TABLET | Refills: 1 | Status: SHIPPED | OUTPATIENT
Start: 2022-10-17 | End: 2023-04-17

## 2022-10-17 NOTE — TELEPHONE ENCOUNTER
"  Last Prescription Date: 7/22/21  Last Qty/Refills: 90 / 4  Last Office Visit: 5/12/22  Future Office Visit: None     Requested Prescriptions   Pending Prescriptions Disp Refills     rosuvastatin (CRESTOR) 10 MG tablet [Pharmacy Med Name: ROSUVASTATIN CALCIUM 10 MG TAB] 90 tablet 4     Sig: TAKE 1 TABLET BY MOUTH EVERY DAY       Statins Protocol Passed - 10/16/2022  9:54 AM        Passed - LDL on file in past 12 months     Recent Labs   Lab Test 05/12/22  1651   *             Passed - No abnormal creatine kinase in past 12 months     No lab results found.             Passed - Recent (12 mo) or future (30 days) visit within the authorizing provider's specialty     Patient has had an office visit with the authorizing provider or a provider within the authorizing providers department within the previous 12 mos or has a future within next 30 days. See \"Patient Info\" tab in [employer], or \"Choose Columns\" in Meds & Orders section of the refill encounter.              Passed - Medication is active on med list        Passed - Patient is age 18 or older        Passed - No active pregnancy on record        Passed - No positive pregnancy test in past 12 months           metoprolol succinate ER (TOPROL XL) 25 MG 24 hr tablet [Pharmacy Med Name: METOPROLOL SUCC ER 25 MG TAB] 90 tablet 4     Sig: TAKE 1 TABLET BY MOUTH EVERY DAY       Beta-Blockers Protocol Passed - 10/16/2022  9:54 AM        Passed - Blood pressure under 140/90 in past 12 months     BP Readings from Last 3 Encounters:   08/10/22 113/77   05/12/22 124/72   08/06/21 124/80                 Passed - Patient is age 6 or older        Passed - Recent (12 mo) or future (30 days) visit within the authorizing provider's specialty     Patient has had an office visit with the authorizing provider or a provider within the authorizing providers department within the previous 12 mos or has a future within next 30 days. See \"Patient Info\" tab in [employer], or \"Choose " "Columns\" in Meds & Orders section of the refill encounter.              Passed - Medication is active on med list             "

## 2022-10-17 NOTE — TELEPHONE ENCOUNTER
"  Last Prescription Date: 7/22/21  Last Qty/Refills: 90 / 4  Last Office Visit: 5/12/22  Future Office Visit: None     Requested Prescriptions   Pending Prescriptions Disp Refills     montelukast (SINGULAIR) 10 MG tablet [Pharmacy Med Name: MONTELUKAST SOD 10 MG TABLET] 90 tablet 4     Sig: TAKE 1 TABLET BY MOUTH EVERY DAY       Leukotriene Inhibitors Protocol Failed - 10/16/2022  9:55 AM        Failed - Asthma control assessment score within normal limits in last 6 months     Please review ACT score.           Passed - Patient is age 12 or older     If patient is under 16, ok to refill using age based dosing.           Passed - Medication is active on med list        Passed - Recent (6 mo) or future (30 days) visit within the authorizing provider's specialty     Patient had office visit in the last 6 months or has a visit in the next 30 days with authorizing provider or within the authorizing provider's specialty.  See \"Patient Info\" tab in in[employer], or \"Choose Columns\" in Meds & Orders section of the refill encounter.                 "

## 2022-10-24 ENCOUNTER — HOSPITAL ENCOUNTER (OUTPATIENT)
Dept: MRI IMAGING | Facility: OTHER | Age: 62
Discharge: HOME | End: 2022-10-24
Attending: INTERNAL MEDICINE | Admitting: INTERNAL MEDICINE
Payer: COMMERCIAL

## 2022-10-24 DIAGNOSIS — Z12.39 ENCOUNTER FOR BREAST CANCER SCREENING USING NON-MAMMOGRAM MODALITY: ICD-10-CM

## 2022-10-24 DIAGNOSIS — T85.43XS BREAST IMPLANT RUPTURE, SEQUELA: ICD-10-CM

## 2022-10-24 PROCEDURE — A9575 INJ GADOTERATE MEGLUMI 0.1ML: HCPCS | Performed by: INTERNAL MEDICINE

## 2022-10-24 PROCEDURE — 77049 MRI BREAST C-+ W/CAD BI: CPT

## 2022-10-24 PROCEDURE — 255N000002 HC RX 255 OP 636: Performed by: INTERNAL MEDICINE

## 2022-10-24 RX ADMIN — GADOTERATE MEGLUMINE 17 ML: 376.9 INJECTION INTRAVENOUS at 11:30

## 2022-11-08 DIAGNOSIS — K21.9 GASTROESOPHAGEAL REFLUX DISEASE WITHOUT ESOPHAGITIS: ICD-10-CM

## 2022-11-08 DIAGNOSIS — Z79.899 HIGH RISK MEDICATION USE: ICD-10-CM

## 2022-11-08 NOTE — TELEPHONE ENCOUNTER
[facility] sent Rx request for the following:    OMEPRAZOLE DR 20 MG CAPSULE  Last Prescription Date:   8/6/21  Last Fill Qty/Refills:         90, R-4    Last Office Visit:              5/12/22   Future Office visit:           None   Neena Benson RN on 11/8/2022 at 2:40 PM

## 2022-12-07 ENCOUNTER — HOSPITAL ENCOUNTER (OUTPATIENT)
Dept: GENERAL RADIOLOGY | Facility: OTHER | Age: 62
Discharge: HOME | End: 2022-12-07
Attending: NURSE PRACTITIONER
Payer: COMMERCIAL

## 2022-12-07 ENCOUNTER — OFFICE VISIT (OUTPATIENT)
Dept: INTERNAL MEDICINE | Facility: OTHER | Age: 62
End: 2022-12-07
Attending: NURSE PRACTITIONER
Payer: COMMERCIAL

## 2022-12-07 VITALS
HEART RATE: 84 BPM | SYSTOLIC BLOOD PRESSURE: 122 MMHG | WEIGHT: 188 LBS | OXYGEN SATURATION: 97 % | TEMPERATURE: 97.2 F | DIASTOLIC BLOOD PRESSURE: 78 MMHG | BODY MASS INDEX: 35.52 KG/M2 | RESPIRATION RATE: 16 BRPM

## 2022-12-07 DIAGNOSIS — E66.01 MORBID OBESITY (H): ICD-10-CM

## 2022-12-07 DIAGNOSIS — R05.8 COUGH PRESENT FOR GREATER THAN 3 WEEKS: ICD-10-CM

## 2022-12-07 DIAGNOSIS — M85.89 OSTEOPENIA OF MULTIPLE SITES: Chronic | ICD-10-CM

## 2022-12-07 DIAGNOSIS — J45.30 MILD PERSISTENT ASTHMA WITHOUT COMPLICATION: ICD-10-CM

## 2022-12-07 DIAGNOSIS — R05.8 COUGH PRESENT FOR GREATER THAN 3 WEEKS: Primary | ICD-10-CM

## 2022-12-07 PROCEDURE — 99214 OFFICE O/P EST MOD 30 MIN: CPT | Performed by: NURSE PRACTITIONER

## 2022-12-07 PROCEDURE — 71046 X-RAY EXAM CHEST 2 VIEWS: CPT

## 2022-12-07 RX ORDER — BENZONATATE 100 MG/1
100 CAPSULE ORAL 3 TIMES DAILY PRN
Qty: 42 CAPSULE | Refills: 0 | Status: SHIPPED | OUTPATIENT
Start: 2022-12-07 | End: 2022-12-21

## 2022-12-07 RX ORDER — POLYETHYLENE GLYCOL 3350, SODIUM CHLORIDE, SODIUM BICARBONATE, POTASSIUM CHLORIDE 420; 11.2; 5.72; 1.48 G/4L; G/4L; G/4L; G/4L
POWDER, FOR SOLUTION ORAL
COMMUNITY
Start: 2022-05-20 | End: 2023-09-07

## 2022-12-07 RX ORDER — METHYLPREDNISOLONE 4 MG
TABLET, DOSE PACK ORAL
COMMUNITY
Start: 2022-10-24 | End: 2023-09-07

## 2022-12-07 RX ORDER — PREDNISONE 20 MG/1
20 TABLET ORAL DAILY
Qty: 5 TABLET | Refills: 0 | Status: SHIPPED | OUTPATIENT
Start: 2022-12-07 | End: 2022-12-12

## 2022-12-07 RX ORDER — CODEINE PHOSPHATE AND GUAIFENESIN 10; 100 MG/5ML; MG/5ML
1-2 SOLUTION ORAL
Qty: 118 ML | Refills: 0 | Status: SHIPPED | OUTPATIENT
Start: 2022-12-07 | End: 2023-09-07

## 2022-12-07 ASSESSMENT — ASTHMA QUESTIONNAIRES
ACT_TOTALSCORE: 14
QUESTION_5 LAST FOUR WEEKS HOW WOULD YOU RATE YOUR ASTHMA CONTROL: SOMEWHAT CONTROLLED
QUESTION_4 LAST FOUR WEEKS HOW OFTEN HAVE YOU USED YOUR RESCUE INHALER OR NEBULIZER MEDICATION (SUCH AS ALBUTEROL): ONE OR TWO TIMES PER DAY
QUESTION_1 LAST FOUR WEEKS HOW MUCH OF THE TIME DID YOUR ASTHMA KEEP YOU FROM GETTING AS MUCH DONE AT WORK, SCHOOL OR AT HOME: NONE OF THE TIME
ACT_TOTALSCORE: 14
QUESTION_3 LAST FOUR WEEKS HOW OFTEN DID YOUR ASTHMA SYMPTOMS (WHEEZING, COUGHING, SHORTNESS OF BREATH, CHEST TIGHTNESS OR PAIN) WAKE YOU UP AT NIGHT OR EARLIER THAN USUAL IN THE MORNING: FOUR OR MORE NIGHTS A WEEK
QUESTION_2 LAST FOUR WEEKS HOW OFTEN HAVE YOU HAD SHORTNESS OF BREATH: THREE TO SIX TIMES A WEEK

## 2022-12-07 ASSESSMENT — PAIN SCALES - GENERAL: PAINLEVEL: NO PAIN (0)

## 2022-12-07 NOTE — NURSING NOTE
"Chief Complaint   Patient presents with     Asthma     Cough - productive, ongoing for about a month       Initial /78 (BP Location: Right arm, Patient Position: Sitting, Cuff Size: Adult Regular)   Pulse 84   Temp 97.2  F (36.2  C) (Temporal)   Resp 16   Wt 85.3 kg (188 lb)   LMP  (LMP Unknown)   SpO2 97%   BMI 35.52 kg/m   Estimated body mass index is 35.52 kg/m  as calculated from the following:    Height as of 8/10/22: 1.549 m (5' 1\").    Weight as of this encounter: 85.3 kg (188 lb).     Medication Reconciliation: complete      FOOD SECURITY SCREENING QUESTIONS:    The next two questions are to help us understand your food security.  If you are feeling you need any assistance in this area, we have resources available to support you today.    Hunger Vital Signs:  Within the past 12 months we worried whether our food would run out before we got money to buy more. Never  Within the past 12 months the food we bought just didn't last and we didn't have money to get more. Never        Advance care plan reviewed      Keli Lee LPN on 12/7/2022 at 11:06 AM      "

## 2022-12-07 NOTE — PROGRESS NOTES
"  Assessment & Plan     Cough present for greater than 3 weeks  If not improving, recommend further imaging with CT.  - XR Chest 2 Views  - benzonatate (TESSALON) 100 MG capsule  Dispense: 42 capsule; Refill: 0  - predniSONE (DELTASONE) 20 MG tablet  Dispense: 5 tablet; Refill: 0  - Adult Pulmonary Medicine Referral  - guaiFENesin-codeine (ROBITUSSIN AC) 100-10 MG/5ML solution  Dispense: 118 mL; Refill: 0    Mild persistent asthma without complication  - Adult Pulmonary Medicine Referral    Ordering of each unique test  Prescription drug management  30 minutes spent on the date of the encounter doing chart review, history and exam, documentation and further activities per the note     BMI:   Estimated body mass index is 35.52 kg/m  as calculated from the following:    Height as of 8/10/22: 1.549 m (5' 1\").    Weight as of this encounter: 85.3 kg (188 lb).     Return if symptoms worsen or fail to improve.    Maren Hameed NP  [facility] AND [facility]    Josue Jordan is a 62 year old, presenting for the following health issues:    Asthma (Cough - productive, ongoing for about a month)  PFTs done about 7 years ago.  Does have allergies.  Has had this cough for more than a month, productive cough, just wont go away.  More SOB    Problems sleeping - has tried melatonin but her daughter persuaded her to stop taking it.    History of Present Illness       Symptom onset:  More than a month  Symptoms include:  Cough.  Symptom intensity:  Moderate  Symptom progression:  Staying the same  Had these symptoms before:  Yes  Has tried/received treatment for these symptoms:  Yes  Previous treatment was successful:  Yes  Prior treatment description:  Medication.  What makes it worse:  Lying down.  What makes it better:  Not specifically.She exercises with enough effort to increase her heart rate 10 to 19 minutes per day.  She exercises with enough effort to increase her heart rate 3 or less days per week. She is " missing 1 dose(s) of medications per week.  She is not taking prescribed medications regularly due to remembering to take.     Acute Illness  Acute illness concerns:  Bronchitis  Onset/Duration:  Over a month  Symptoms:  Fever: No  Chills/Sweats: No  Headache (location?): YES  Sinus Pressure: No  Conjunctivitis:  No  Ear Pain: no  Rhinorrhea: No  Congestion: YES - chest  Sore Throat: No  Cough: YES-productive of clear to pale yellow, sometimes milky thick  Wheeze: YES  Decreased Appetite: No  Nausea: No  Vomiting: No  Diarrhea: No  Dysuria/Freq.: No  Dysuria or Hematuria: No  Fatigue/Achiness: YES - fatigued  Sick/Strep Exposure: YES  Therapies tried and outcome: Inhaler, cough lozenges    Review of Systems   Constitutional: Positive for fatigue.   HENT: Positive for congestion.    Respiratory: Positive for cough and wheezing.    Neurological: Positive for headaches.   All other systems reviewed and are negative.         Objective    /78 (BP Location: Right arm, Patient Position: Sitting, Cuff Size: Adult Regular)   Pulse 84   Temp 97.2  F (36.2  C) (Temporal)   Resp 16   Wt 85.3 kg (188 lb)   LMP  (LMP Unknown)   SpO2 97%   BMI 35.52 kg/m    Body mass index is 35.52 kg/m .  Physical Exam  Vitals and nursing note reviewed.   Constitutional:       Appearance: Normal appearance. She is obese.   HENT:      Head: Normocephalic and atraumatic.   Eyes:      Extraocular Movements: Extraocular movements intact.      Conjunctiva/sclera: Conjunctivae normal.   Cardiovascular:      Rate and Rhythm: Normal rate and regular rhythm.      Heart sounds: Normal heart sounds.   Pulmonary:      Breath sounds: Decreased air movement present.   Abdominal:      General: Bowel sounds are normal.      Palpations: Abdomen is soft.   Musculoskeletal:         General: Normal range of motion.   Skin:     General: Skin is warm and dry.   Neurological:      Mental Status: She is alert and oriented to person, place, and time. Mental  status is at baseline.   Psychiatric:         Mood and Affect: Mood normal.         Behavior: Behavior normal.         Thought Content: Thought content normal.         Judgment: Judgment normal.       Results for orders placed or performed during the hospital encounter of 12/07/22   XR Chest 2 Views     Status: None    Narrative    PROCEDURE:  XR CHEST 2 VIEWS    HISTORY: Cough present for greater than 3 weeks, .    COMPARISON:  5/17/2018    FINDINGS:  The cardiomediastinal contours are unchanged. The trachea is midline.  No focal consolidation, effusion or pneumothorax.    Osteopenia or osteoporosis. No subdiaphragmatic free air.      Impression    IMPRESSION:      No new focal consolidation.      YANELY HALE MD         SYSTEM ID:  [id number]

## 2022-12-12 ENCOUNTER — MYC MEDICAL ADVICE (OUTPATIENT)
Dept: INTERNAL MEDICINE | Facility: OTHER | Age: 62
End: 2022-12-12

## 2022-12-13 PROBLEM — E66.01 MORBID OBESITY (H): Status: ACTIVE | Noted: 2022-12-13

## 2022-12-13 PROBLEM — R61 NIGHT SWEATS: Status: RESOLVED | Noted: 2021-04-21 | Resolved: 2022-12-13

## 2022-12-13 PROBLEM — M85.89 OSTEOPENIA OF MULTIPLE SITES: Chronic | Status: ACTIVE | Noted: 2022-12-13

## 2022-12-13 ASSESSMENT — ENCOUNTER SYMPTOMS
HEADACHES: 1
FATIGUE: 1
COUGH: 1
WHEEZING: 1

## 2023-01-09 ENCOUNTER — TELEPHONE (OUTPATIENT)
Dept: INTERNAL MEDICINE | Facility: OTHER | Age: 63
End: 2023-01-09

## 2023-01-09 NOTE — TELEPHONE ENCOUNTER
S: Gas bloating, severe  B: History of diverticulitis, Reflux, and asthma, quit taking Prilosac about 5 months ago.   A: Denies fever, or nausea, no GI flu noted in the last 3 months. Issue has been going on for about 5 months. She takes Benefiber, has changed from Metamucil a few months ago, this helped but is still very uncomfortable. What she eats does not matter, it causes severe gas and bloating.   R: Per Dr. Medellin: Hold fiber 1 week then call back and update. Reviewed eating foods that have reduced gas properties, drinking lots of water and how to manage constipation. Patient is in agreement with the plan. Kavitha Carrillo RN on 1/9/2023 at 9:26 AM

## 2023-01-09 NOTE — TELEPHONE ENCOUNTER
Reason for call: Patient wanting a work in appointment.    Is the appointment for a Hospital Follow up?  no     (If yes - Unable to find an appointment with any provider during the time frame needed. Nurse/Provider - Can this patient be worked into a schedule with PCP or team member?)    Patient is having the following symptoms:  digestive issues, spouse said effecting her attitude also.    The patient is requesting an appointment with  [facility]    Was an appointment offered for this call? No    If Yes, what is the date of the appointment?  NA     Preferred method for responding to this message: Telephone Call    Phone number patient can be reached at? Home number on file [phone] (home)    If we can't reach you directly, may we leave a detailed response at the number you provided? Yes    Can this message wait until your PCP/provider returns if unavailable today? No     Kira Eldridge on 1/9/2023 at 7:21 AM

## 2023-01-22 ENCOUNTER — HEALTH MAINTENANCE LETTER (OUTPATIENT)
Age: 63
End: 2023-01-22

## 2023-04-14 DIAGNOSIS — Z86.79 HISTORY OF SUPRAVENTRICULAR TACHYCARDIA: ICD-10-CM

## 2023-04-14 DIAGNOSIS — J45.30 MILD PERSISTENT ASTHMA WITHOUT COMPLICATION: ICD-10-CM

## 2023-04-14 DIAGNOSIS — E78.2 MIXED HYPERLIPIDEMIA: ICD-10-CM

## 2023-04-17 NOTE — TELEPHONE ENCOUNTER
[facility] in #[id number] in [facility] [place] sent Rx request for the following:      Requested Prescriptions   Pending Prescriptions Disp Refills     metoprolol succinate ER (TOPROL XL) 25 MG 24 hr tablet [Pharmacy Med Name: METOPROLOL SUCC ER 25 MG TAB] 90 tablet 1     Sig: TAKE 1 TABLET BY MOUTH EVERY DAY   Last Prescription Date:   10/17/22  Last Fill Qty/Refills:         90, R-1       rosuvastatin (CRESTOR) 10 MG tablet [Pharmacy Med Name: ROSUVASTATIN CALCIUM 10 MG TAB] 90 tablet 1     Sig: TAKE 1 TABLET BY MOUTH EVERY DAY   Last Prescription Date:   10/17/22  Last Fill Qty/Refills:         90, R-1       montelukast (SINGULAIR) 10 MG tablet [Pharmacy Med Name: MONTELUKAST SOD 10 MG TABLET] 90 tablet 1     Sig: TAKE 1 TABLET BY MOUTH EVERY DAY   Last Prescription Date:   10/17/22  Last Fill Qty/Refills:         90, R-1    Leukotriene Inhibitors Protocol Failed - 4/17/2023  4:46 PM        Failed - Asthma control assessment score within normal limits in last 6 months     Please review ACT score.      Last Office Visit:              12/7/22   Future Office visit:           None    Tahira Salmeron RN .............. 4/17/2023  4:48 PM

## 2023-04-20 RX ORDER — MONTELUKAST SODIUM 10 MG/1
TABLET ORAL
Qty: 90 TABLET | Refills: 0 | Status: SHIPPED | OUTPATIENT
Start: 2023-04-20 | End: 2023-09-20

## 2023-04-20 RX ORDER — METOPROLOL SUCCINATE 25 MG/1
TABLET, EXTENDED RELEASE ORAL
Qty: 90 TABLET | Refills: 1 | Status: SHIPPED | OUTPATIENT
Start: 2023-04-20 | End: 2023-10-20

## 2023-04-20 RX ORDER — ROSUVASTATIN CALCIUM 10 MG/1
TABLET, COATED ORAL
Qty: 90 TABLET | Refills: 1 | Status: SHIPPED | OUTPATIENT
Start: 2023-04-20 | End: 2023-08-22

## 2023-04-30 ENCOUNTER — HEALTH MAINTENANCE LETTER (OUTPATIENT)
Age: 63
End: 2023-04-30

## 2023-05-25 ENCOUNTER — OFFICE VISIT (OUTPATIENT)
Dept: INTERNAL MEDICINE | Facility: OTHER | Age: 63
End: 2023-05-25
Attending: NURSE PRACTITIONER
Payer: COMMERCIAL

## 2023-05-25 ENCOUNTER — TELEPHONE (OUTPATIENT)
Dept: SURGERY | Facility: OTHER | Age: 63
End: 2023-05-25

## 2023-05-25 VITALS
OXYGEN SATURATION: 95 % | WEIGHT: 180.8 LBS | DIASTOLIC BLOOD PRESSURE: 74 MMHG | HEART RATE: 69 BPM | TEMPERATURE: 98.8 F | BODY MASS INDEX: 34.16 KG/M2 | RESPIRATION RATE: 14 BRPM | SYSTOLIC BLOOD PRESSURE: 122 MMHG

## 2023-05-25 DIAGNOSIS — F40.243 FEAR OF FLYING: ICD-10-CM

## 2023-05-25 DIAGNOSIS — Z13.89 SCREENING FOR NEPHROPATHY: ICD-10-CM

## 2023-05-25 DIAGNOSIS — D72.829 LEUKOCYTOSIS, UNSPECIFIED TYPE: ICD-10-CM

## 2023-05-25 DIAGNOSIS — Z82.49 FAMILY HISTORY OF ISCHEMIC HEART DISEASE: ICD-10-CM

## 2023-05-25 DIAGNOSIS — F41.1 GAD (GENERALIZED ANXIETY DISORDER): ICD-10-CM

## 2023-05-25 DIAGNOSIS — Z79.899 ENCOUNTER FOR LONG-TERM (CURRENT) USE OF HIGH-RISK MEDICATION: ICD-10-CM

## 2023-05-25 DIAGNOSIS — E61.1 LOW IRON: ICD-10-CM

## 2023-05-25 DIAGNOSIS — R74.8 ELEVATED LIVER ENZYMES: ICD-10-CM

## 2023-05-25 DIAGNOSIS — E78.2 MIXED HYPERLIPIDEMIA: Primary | ICD-10-CM

## 2023-05-25 DIAGNOSIS — J45.30 MILD PERSISTENT ASTHMA WITHOUT COMPLICATION: ICD-10-CM

## 2023-05-25 DIAGNOSIS — Z00.00 ENCOUNTER FOR ANNUAL PHYSICAL EXAM: ICD-10-CM

## 2023-05-25 DIAGNOSIS — K76.0 FATTY LIVER DISEASE, NONALCOHOLIC: ICD-10-CM

## 2023-05-25 DIAGNOSIS — Z82.3 FAMILY HISTORY OF STROKE: ICD-10-CM

## 2023-05-25 DIAGNOSIS — F41.8 SITUATIONAL ANXIETY: ICD-10-CM

## 2023-05-25 DIAGNOSIS — R06.09 DOE (DYSPNEA ON EXERTION): ICD-10-CM

## 2023-05-25 DIAGNOSIS — Z86.79 HISTORY OF SUPRAVENTRICULAR TACHYCARDIA: ICD-10-CM

## 2023-05-25 DIAGNOSIS — M51.369 DDD (DEGENERATIVE DISC DISEASE), LUMBAR: ICD-10-CM

## 2023-05-25 DIAGNOSIS — R73.03 PREDIABETES: ICD-10-CM

## 2023-05-25 DIAGNOSIS — M85.89 OSTEOPENIA OF MULTIPLE SITES: ICD-10-CM

## 2023-05-25 DIAGNOSIS — K21.9 GASTROESOPHAGEAL REFLUX DISEASE, UNSPECIFIED WHETHER ESOPHAGITIS PRESENT: ICD-10-CM

## 2023-05-25 DIAGNOSIS — Q96.3 MOSAIC TURNER SYNDROME: ICD-10-CM

## 2023-05-25 DIAGNOSIS — Z13.29 SCREENING FOR THYROID DISORDER: ICD-10-CM

## 2023-05-25 DIAGNOSIS — K21.9 GASTROESOPHAGEAL REFLUX DISEASE WITHOUT ESOPHAGITIS: ICD-10-CM

## 2023-05-25 DIAGNOSIS — I51.7 CARDIOMEGALY: ICD-10-CM

## 2023-05-25 DIAGNOSIS — E66.01 MORBID OBESITY (H): ICD-10-CM

## 2023-05-25 DIAGNOSIS — M15.0 PRIMARY OSTEOARTHRITIS INVOLVING MULTIPLE JOINTS: ICD-10-CM

## 2023-05-25 LAB
ALBUMIN SERPL BCG-MCNC: 4.6 G/DL (ref 3.5–5.2)
ALP SERPL-CCNC: 74 U/L (ref 35–104)
ALT SERPL W P-5'-P-CCNC: 60 U/L (ref 10–35)
ANION GAP SERPL CALCULATED.3IONS-SCNC: 11 MMOL/L (ref 7–15)
AST SERPL W P-5'-P-CCNC: 40 U/L (ref 10–35)
BASOPHILS # BLD AUTO: 0.1 10E3/UL (ref 0–0.2)
BASOPHILS NFR BLD AUTO: 1 %
BILIRUB SERPL-MCNC: 0.4 MG/DL
BUN SERPL-MCNC: 17 MG/DL (ref 8–23)
CALCIUM SERPL-MCNC: 10.2 MG/DL (ref 8.8–10.2)
CHLORIDE SERPL-SCNC: 103 MMOL/L (ref 98–107)
CHOLEST SERPL-MCNC: 204 MG/DL
CREAT SERPL-MCNC: 0.66 MG/DL (ref 0.51–0.95)
CREAT UR-MCNC: 91.3 MG/DL
DEPRECATED HCO3 PLAS-SCNC: 26 MMOL/L (ref 22–29)
EOSINOPHIL # BLD AUTO: 0.2 10E3/UL (ref 0–0.7)
EOSINOPHIL NFR BLD AUTO: 3 %
ERYTHROCYTE [DISTWIDTH] IN BLOOD BY AUTOMATED COUNT: 13.3 % (ref 10–15)
GFR SERPL CREATININE-BSD FRML MDRD: >90 ML/MIN/1.73M2
GLUCOSE SERPL-MCNC: 111 MG/DL (ref 70–99)
HBA1C MFR BLD: 6 % (ref 4–6.2)
HCT VFR BLD AUTO: 43.4 % (ref 35–47)
HDLC SERPL-MCNC: 67 MG/DL
HGB BLD-MCNC: 14.7 G/DL (ref 11.7–15.7)
IMM GRANULOCYTES # BLD: 0 10E3/UL
IMM GRANULOCYTES NFR BLD: 0 %
LDLC SERPL CALC-MCNC: 112 MG/DL
LYMPHOCYTES # BLD AUTO: 2.5 10E3/UL (ref 0.8–5.3)
LYMPHOCYTES NFR BLD AUTO: 36 %
MCH RBC QN AUTO: 31.7 PG (ref 26.5–33)
MCHC RBC AUTO-ENTMCNC: 33.9 G/DL (ref 31.5–36.5)
MCV RBC AUTO: 94 FL (ref 78–100)
MICROALBUMIN UR-MCNC: <12 MG/L
MICROALBUMIN/CREAT UR: NORMAL MG/G{CREAT}
MONOCYTES # BLD AUTO: 0.6 10E3/UL (ref 0–1.3)
MONOCYTES NFR BLD AUTO: 9 %
NEUTROPHILS # BLD AUTO: 3.5 10E3/UL (ref 1.6–8.3)
NEUTROPHILS NFR BLD AUTO: 51 %
NONHDLC SERPL-MCNC: 137 MG/DL
NRBC # BLD AUTO: 0 10E3/UL
NRBC BLD AUTO-RTO: 0 /100
PLATELET # BLD AUTO: 242 10E3/UL (ref 150–450)
POTASSIUM SERPL-SCNC: 4.4 MMOL/L (ref 3.4–5.3)
PROT SERPL-MCNC: 7.5 G/DL (ref 6.4–8.3)
RBC # BLD AUTO: 4.64 10E6/UL (ref 3.8–5.2)
SODIUM SERPL-SCNC: 140 MMOL/L (ref 136–145)
TRIGL SERPL-MCNC: 123 MG/DL
TSH SERPL DL<=0.005 MIU/L-ACNC: 1.08 UIU/ML (ref 0.3–4.2)
WBC # BLD AUTO: 6.9 10E3/UL (ref 4–11)

## 2023-05-25 PROCEDURE — 85025 COMPLETE CBC W/AUTO DIFF WBC: CPT | Mod: ZL | Performed by: NURSE PRACTITIONER

## 2023-05-25 PROCEDURE — 84443 ASSAY THYROID STIM HORMONE: CPT | Mod: ZL | Performed by: NURSE PRACTITIONER

## 2023-05-25 PROCEDURE — 82570 ASSAY OF URINE CREATININE: CPT | Mod: ZL | Performed by: NURSE PRACTITIONER

## 2023-05-25 PROCEDURE — 99214 OFFICE O/P EST MOD 30 MIN: CPT | Mod: 25 | Performed by: NURSE PRACTITIONER

## 2023-05-25 PROCEDURE — 80053 COMPREHEN METABOLIC PANEL: CPT | Mod: ZL | Performed by: NURSE PRACTITIONER

## 2023-05-25 PROCEDURE — 80061 LIPID PANEL: CPT | Mod: ZL | Performed by: NURSE PRACTITIONER

## 2023-05-25 PROCEDURE — G0439 PPPS, SUBSEQ VISIT: HCPCS | Performed by: NURSE PRACTITIONER

## 2023-05-25 PROCEDURE — 83036 HEMOGLOBIN GLYCOSYLATED A1C: CPT | Mod: ZL | Performed by: NURSE PRACTITIONER

## 2023-05-25 PROCEDURE — 36415 COLL VENOUS BLD VENIPUNCTURE: CPT | Mod: ZL | Performed by: NURSE PRACTITIONER

## 2023-05-25 RX ORDER — ALPRAZOLAM 0.5 MG
0.5 TABLET ORAL 2 TIMES DAILY PRN
Qty: 60 TABLET | Refills: 0 | Status: SHIPPED | OUTPATIENT
Start: 2023-05-25

## 2023-05-25 RX ORDER — FLUOXETINE 10 MG/1
10 CAPSULE ORAL DAILY
Qty: 90 CAPSULE | Refills: 4 | Status: SHIPPED | OUTPATIENT
Start: 2023-05-25 | End: 2024-05-28

## 2023-05-25 ASSESSMENT — ENCOUNTER SYMPTOMS
FEVER: 0
SORE THROAT: 0
MYALGIAS: 0
HEMATURIA: 0
CONSTIPATION: 0
WEAKNESS: 0
EYE PAIN: 0
DIZZINESS: 0
COUGH: 0
JOINT SWELLING: 0
HEADACHES: 1
BREAST MASS: 0
SHORTNESS OF BREATH: 0
FREQUENCY: 0
ABDOMINAL PAIN: 0
NERVOUS/ANXIOUS: 1
PARESTHESIAS: 0
ARTHRALGIAS: 1
PALPITATIONS: 0
HEARTBURN: 1
HEMATOCHEZIA: 0
CHILLS: 0
DYSURIA: 0
NAUSEA: 0
DIARRHEA: 0

## 2023-05-25 ASSESSMENT — ASTHMA QUESTIONNAIRES
QUESTION_2 LAST FOUR WEEKS HOW OFTEN HAVE YOU HAD SHORTNESS OF BREATH: NOT AT ALL
QUESTION_5 LAST FOUR WEEKS HOW WOULD YOU RATE YOUR ASTHMA CONTROL: WELL CONTROLLED
ACT_TOTALSCORE: 22
QUESTION_3 LAST FOUR WEEKS HOW OFTEN DID YOUR ASTHMA SYMPTOMS (WHEEZING, COUGHING, SHORTNESS OF BREATH, CHEST TIGHTNESS OR PAIN) WAKE YOU UP AT NIGHT OR EARLIER THAN USUAL IN THE MORNING: NOT AT ALL
ACT_TOTALSCORE: 22
QUESTION_4 LAST FOUR WEEKS HOW OFTEN HAVE YOU USED YOUR RESCUE INHALER OR NEBULIZER MEDICATION (SUCH AS ALBUTEROL): TWO OR THREE TIMES PER WEEK
QUESTION_1 LAST FOUR WEEKS HOW MUCH OF THE TIME DID YOUR ASTHMA KEEP YOU FROM GETTING AS MUCH DONE AT WORK, SCHOOL OR AT HOME: NONE OF THE TIME

## 2023-05-25 ASSESSMENT — PAIN SCALES - GENERAL: PAINLEVEL: MILD PAIN (2)

## 2023-05-25 ASSESSMENT — ACTIVITIES OF DAILY LIVING (ADL): CURRENT_FUNCTION: NO ASSISTANCE NEEDED

## 2023-05-25 NOTE — TELEPHONE ENCOUNTER
[facility] Diagnostic Referral    Patient has a referral for an EGD with a diagnosis of Gastroesophageal reflux disease, unspecified whether esophagitis present  Encounter for long-term (current) use of high-risk medication.    Please advise.    Thank you,    Nancy Rivera on 5/25/2023 at 10:31 AM

## 2023-05-25 NOTE — PROGRESS NOTES
"   SUBJECTIVE:   CC: Nikki is an 63 year old who presents for preventive health visit.       5/25/2023     9:39 AM   Additional Questions   Roomed by Keli WEAVER LPN   Patient has been advised of split billing requirements and indicates understanding: Yes     Healthy Habits:     In general, how would you rate your overall health?  Good    Frequency of exercise:  2-3 days/week    Duration of exercise:  30-45 minutes    Do you usually eat at least 4 servings of fruit and vegetables a day, include whole grains    & fiber and avoid regularly eating high fat or \"junk\" foods?  Yes    Taking medications regularly:  Yes    Medication side effects:  None    Ability to successfully perform activities of daily living:  No assistance needed    Home Safety:  No safety concerns identified    Hearing Impairment:  No hearing concerns    In the past 6 months, have you been bothered by leaking of urine?  No    In general, how would you rate your overall mental or emotional health?  Good      PHQ-2 Total Score: 2    Additional concerns today:  Yes    Gina Forbes: Follows and is scheduled to be seen at [facility]    Foot cramps: all the time    PPI Use: has weaned off Prilosec. She is taking Pepcid TWICE DAILY but doesn't feel it handles her GERD as well as the Prilosec. She has modified diet. She feels Prilosec helped her IBS. - discussed having EGD and she would willing.    Insomnia:  She goes to bed between 10-11, lays there until about 2 am. She has tried melatonin gummies. - encouraged to try melatonin 10 mg. Occasionally takes Tylenol PM which works.    Arthritis: worsening. Celebrex did work but she has concerns about her heart issues. Left hip is very painful. Has had one episode of injection in this hip.  She will call for injection orders if it gets worse. Following with a chiropractor also.    Cardiac family history: wants a cardiac referral. - will place order/referral. Two siblings have had death related to cardiac issues. Two " younger siblings have had heart attacks.    Obesity: Follows with Endocrinology, wants to look into GLP1's for weight loss (Ozempic) which has shown great results with weight loss.    Anxiety: has had three family deaths, in addition to putting her dog down in the past two weeks. She has used Prozac in the past and felt this worked. She also would like refills of Xanax.    Calcium Score: she is interested in having this done due to her strong family history of cardiac death.    Carotid Ultrasound: She is interested in having this done due to her strong family history of cardiac death.    Your cardiovascular risk score is 4.9%. This risk score is calculated based on age, blood pressure, cholesterol level, smoking history, current medications, and whether you are a diabetic or not. This is your calculated percent risk of having a cardiovascular event such as a heart attack or stroke within the next 10 years.     Individuals are preliminarily classified based on estimated risk: 10-year ASCVD risk <5% is low risk; 5%-7.5% is borderline risk; 7.5-20% is intermediate risk, and ?20% is high risk. High risk individuals should be strongly recommended statin therapy on the basis of risk alone after a clinician patient risk discussion    .The 10-year ASCVD risk score (Nilam ABBASI, et al., 2019) is: 4.7%    Values used to calculate the score:      Age: 63 years      Sex: Female      Is Non- : No      Diabetic: No      Tobacco smoker: No      Systolic Blood Pressure: 122 mmHg      Is BP treated: No      HDL Cholesterol: 67 mg/dL      Total Cholesterol: 204 mg/dL    Today's PHQ-2 Score:       5/25/2023     9:26 AM   PHQ-2 ( 1999 [employer])   Q1: Little interest or pleasure in doing things 1   Q2: Feeling down, depressed or hopeless 1   PHQ-2 Score 2   Q1: Little interest or pleasure in doing things Several days   Q2: Feeling down, depressed or hopeless Several days   PHQ-2 Score 2     Social History     Tobacco  Use     Smoking status: Never     Smokeless tobacco: Never   Vaping Use     Vaping status: Never Used   Substance Use Topics     Alcohol use: Yes     Alcohol/week: 0.0 standard drinks of alcohol     Comment: 3 per week         5/25/2023     9:26 AM   Alcohol Use   Prescreen: >3 drinks/day or >7 drinks/week? No     Reviewed orders with patient.  Reviewed health maintenance and updated orders accordingly - Yes  Lab work is in process  BP Readings from Last 3 Encounters:   05/25/23 122/74   12/07/22 122/78   08/10/22 113/77    Wt Readings from Last 3 Encounters:   05/25/23 82 kg (180 lb 12.8 oz)   12/07/22 85.3 kg (188 lb)   08/10/22 85.3 kg (188 lb)                  Recent Labs   Lab Test 05/25/23  0941 05/12/22  1651 08/06/21  1011 08/06/21  0944 08/06/21  0943 04/14/21  0734 06/22/20  1119 01/31/20  0909   A1C 6.0  --   --   --  6.1  --  6.0  --    LDL  --  124* 116*  --   --   --   --  95   HDL  --  67 68  --   --   --   --  57   TRIG  --  150* 139  --   --   --   --  119   ALT  --  80*  --  42  --  34 42 36   CR  --  0.74  --  0.69  --  0.68 0.76 0.62   GFRESTIMATED  --  90  --  >90  --  87 77 >90   GFRESTBLACK  --   --   --   --   --  >90 >90 >90   POTASSIUM  --  4.2  --  3.8  --  3.5 4.5 3.7   TSH  --  1.65  --   --  1.37  --   --   --         Breast Cancer Screening:  Any new diagnosis of family breast, ovarian, or bowel cancer? No    S-7:        View : No data to display.              Mammogram Screening: Recommended mammography every 1-2 years with patient discussion and risk factor consideration  Pertinent mammograms are reviewed under the imaging tab.  Breast MRI - 10/24/22  History of abnormal Pap smear:      Reviewed and updated as needed this visit by clinical staff   Tobacco  Allergies  Meds  Problems  Med Hx  Surg Hx  Fam Hx  Soc   Hx        Reviewed and updated as needed this visit by Provider   Tobacco  Allergies  Meds  Problems  Med Hx  Surg Hx  Fam Hx         Past Medical History:    Diagnosis Date     Allergic rhinitis 01/12/2007     Amblyopia of eye     left eye; patching and vision therapy.     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   HENT: Positive for hearing loss. Negative for congestion, ear pain and sore throat.    Eyes: Negative for pain and visual disturbance.   Respiratory: Negative for cough and shortness of breath.    Cardiovascular: Negative for chest pain, palpitations and peripheral edema.   Gastrointestinal: Positive for heartburn. Negative for abdominal pain, constipation, diarrhea, hematochezia and nausea.   Breasts:  Positive for tenderness. Negative for breast mass and discharge.   Genitourinary: Negative for dysuria, frequency, genital sores, hematuria, pelvic pain, urgency, vaginal bleeding and vaginal discharge.   Musculoskeletal: Positive for arthralgias. Negative for joint swelling and myalgias.   Skin: Negative for rash.   Neurological: Positive for headaches. Negative  for dizziness, weakness and paresthesias.   Psychiatric/Behavioral: Negative for mood changes. The patient is nervous/anxious.       OBJECTIVE:   /74 (BP Location: Right arm, Patient Position: Sitting, Cuff Size: Adult Regular)   Pulse 69   Temp 98.8  F (37.1  C) (Temporal)   Resp 14   Wt 82 kg (180 lb 12.8 oz)   LMP  (LMP Unknown)   SpO2 95%   Breastfeeding No   BMI 34.16 kg/m    Physical Exam  GENERAL APPEARANCE: healthy, alert and no distress  EYES: Eyes grossly normal to inspection, PERRL and conjunctivae and sclerae normal  HENT: ear canals and TM's normal, nose and mouth without ulcers or lesions, oropharynx clear and oral mucous membranes moist  NECK: no adenopathy, no asymmetry, masses, or scars and thyroid normal to palpation  RESP: lungs clear to auscultation - no rales, rhonchi or wheezes  CV: regular rates and rhythm, no peripheral edema and peripheral pulses strong  ABDOMEN: soft, nontender and bowel sounds normal  MS: no musculoskeletal defects are noted and gait is age appropriate without ataxia  SKIN: no suspicious lesions or rashes  NEURO: Normal strength and tone, sensory exam grossly normal, mentation intact and speech normal  PSYCH: mentation appears normal and affect normal/bright    Diagnostic Test Results:    Results for orders placed or performed in visit on 05/25/23   TSH with free T4 reflex     Status: Normal   Result Value Ref Range    TSH 1.08 0.30 - 4.20 uIU/mL   Lipid Profile     Status: Abnormal   Result Value Ref Range    Cholesterol 204 (H) <200 mg/dL    Triglycerides 123 <150 mg/dL    Direct Measure HDL 67 >=50 mg/dL    LDL Cholesterol Calculated 112 (H) <=100 mg/dL    Non HDL Cholesterol 137 (H) <130 mg/dL    Narrative    Cholesterol  Desirable:  <200 mg/dL    Triglycerides  Normal:  Less than 150 mg/dL  Borderline High:  150-199 mg/dL  High:  200-499 mg/dL  Very High:  Greater than or equal to 500 mg/dL    Direct Measure HDL  Female:  Greater than or equal to 50  mg/dL   Male:  Greater than or equal to 40 mg/dL    LDL Cholesterol  Desirable:  <100mg/dL  Above Desirable:  100-129 mg/dL   Borderline High:  130-159 mg/dL   High:  160-189 mg/dL   Very High:  >= 190 mg/dL    Non HDL Cholesterol  Desirable:  130 mg/dL  Above Desirable:  130-159 mg/dL  Borderline High:  160-189 mg/dL  High:  190-219 mg/dL  Very High:  Greater than or equal to 220 mg/dL   Hemoglobin A1c     Status: Normal   Result Value Ref Range    Hemoglobin A1C 6.0 4.0 - 6.2 %   COMPREHENSIVE METABOLIC PANEL     Status: Abnormal   Result Value Ref Range    Sodium 140 136 - 145 mmol/L    Potassium 4.4 3.4 - 5.3 mmol/L    Chloride 103 98 - 107 mmol/L    Carbon Dioxide (CO2) 26 22 - 29 mmol/L    Anion Gap 11 7 - 15 mmol/L    Urea Nitrogen 17.0 8.0 - 23.0 mg/dL    Creatinine 0.66 0.51 - 0.95 mg/dL    Calcium 10.2 8.8 - 10.2 mg/dL    Glucose 111 (H) 70 - 99 mg/dL    Alkaline Phosphatase 74 35 - 104 U/L    AST 40 (H) 10 - 35 U/L    ALT 60 (H) 10 - 35 U/L    Protein Total 7.5 6.4 - 8.3 g/dL    Albumin 4.6 3.5 - 5.2 g/dL    Bilirubin Total 0.4 <=1.2 mg/dL    GFR Estimate >90 >60 mL/min/1.73m2   Albumin Random Urine Quantitative with Creat Ratio     Status: None   Result Value Ref Range    Creatinine Urine mg/dL 91.3 mg/dL    Albumin Urine mg/L <12.0 mg/L    Albumin Urine mg/g Cr     CBC with platelets and differential     Status: None   Result Value Ref Range    WBC Count 6.9 4.0 - 11.0 10e3/uL    RBC Count 4.64 3.80 - 5.20 10e6/uL    Hemoglobin 14.7 11.7 - 15.7 g/dL    Hematocrit 43.4 35.0 - 47.0 %    MCV 94 78 - 100 fL    MCH 31.7 26.5 - 33.0 pg    MCHC 33.9 31.5 - 36.5 g/dL    RDW 13.3 10.0 - 15.0 %    Platelet Count 242 150 - 450 10e3/uL    % Neutrophils 51 %    % Lymphocytes 36 %    % Monocytes 9 %    % Eosinophils 3 %    % Basophils 1 %    % Immature Granulocytes 0 %    NRBCs per 100 WBC 0 <1 /100    Absolute Neutrophils 3.5 1.6 - 8.3 10e3/uL    Absolute Lymphocytes 2.5 0.8 - 5.3 10e3/uL    Absolute Monocytes 0.6  0.0 - 1.3 10e3/uL    Absolute Eosinophils 0.2 0.0 - 0.7 10e3/uL    Absolute Basophils 0.1 0.0 - 0.2 10e3/uL    Absolute Immature Granulocytes 0.0 <=0.4 10e3/uL    Absolute NRBCs 0.0 10e3/uL   CBC with Platelets & Differential     Status: None    Narrative    The following orders were created for panel order CBC with Platelets & Differential.  Procedure                               Abnormality         Status                     ---------                               -----------         ------                     CBC with platelets and d...[[id number]]                      Final result                 Please view results for these tests on the individual orders.       ASSESSMENT/PLAN:   Nikki was seen today for physical.    Diagnoses and all orders for this visit:    Mixed hyperlipidemia  -     Lipid Profile slightly improved from 1 year ago however just slightly abnormal.  Patient encouraged to continue working on diet improvement and increasing daily exercise to help lower these numbers.  Recheck in 1 year.,    Leukocytosis, unspecified type  -     CBC with Platelets & Differential unremarkable.    Gastroesophageal reflux disease without esophagitis  -     Adult GI  Referral - Procedure Only; Future    Morbid obesity (H)    Osteopenia of multiple sites    Mild persistent asthma without complication    History of supraventricular tachycardia  -     Adult Cardiology Eval  Referral; Future  -     US Carotid Bilateral; Future  -     CT Coronary Calcium Scan; Future    ZARATE (dyspnea on exertion)    Elevated liver enzymes    Mosaic Lino syndrome  -     Adult Cardiology Eval  Referral; Future  -     US Carotid Bilateral; Future  -     CT Coronary Calcium Scan; Future    Low iron    Encounter for annual physical exam    Cardiomegaly  -     COMPREHENSIVE METABOLIC PANEL, slightly elevated glucose at 111.  Liver function tests are still abnormal however improved slightly.  -     Adult Cardiology  Eval  Referral; Future  -     CT Coronary Calcium Scan; Future    Primary osteoarthritis involving multiple joints    DDD (degenerative disc disease), lumbar    Situational anxiety  -     FLUoxetine (PROZAC) 10 MG capsule; Take 1 capsule (10 mg) by mouth daily    Prediabetes  -     Hemoglobin A1c, in normal range.  Rescreen annually.    Screening for thyroid disorder  -     TSH with free T4 reflex, in normal range.  Rescreen annually.    Screening for nephropathy  -     Albumin Random Urine Quantitative with Creat Ratio    Fear of flying    Gastroesophageal reflux disease, unspecified whether esophagitis present  -     Adult GI  Referral - Procedure Only; Future    Fatty liver disease, nonalcoholic    MODESTO (generalized anxiety disorder)  -     FLUoxetine (PROZAC) 10 MG capsule; Take 1 capsule (10 mg) by mouth daily    Family history of ischemic heart disease  -     Adult Cardiology Eval  Referral; Future  -     US Carotid Bilateral; Future  -     CT Coronary Calcium Scan; Future    Encounter for long-term (current) use of high-risk medication  -     Adult GI  Referral - Procedure Only; Future    Family history of stroke  -     Adult Cardiology Eval  Referral; Future  -     US Carotid Bilateral; Future  -     CT Coronary Calcium Scan; Future    Patient has been advised of split billing requirements and indicates understanding: Yes    COUNSELING:  Reviewed preventive health counseling, as reflected in patient instructions  Special attention given to:        Regular exercise       Healthy diet/nutrition       Vision screening       Hearing screening       Folic Acid       Osteoporosis prevention/bone health       Colorectal Cancer Screening       The 10-year ASCVD risk score (Nilam ABBASI, et al., 2019) is: 4.7%    Values used to calculate the score:      Age: 63 years      Sex: Female      Is Non- : No      Diabetic: No      Tobacco smoker: No       "Systolic Blood Pressure: 122 mmHg      Is BP treated: No      HDL Cholesterol: 67 mg/dL      Total Cholesterol: 204 mg/dL    BMI:   Estimated body mass index is 34.16 kg/m  as calculated from the following:    Height as of 8/10/22: 1.549 m (5' 1\").    Weight as of this encounter: 82 kg (180 lb 12.8 oz).   Weight management plan: Discussed healthy diet and exercise guidelines    She reports that she has never smoked. She has never used smokeless tobacco.    Maren Hameed NP  [facility]  "

## 2023-05-25 NOTE — NURSING NOTE
"Chief Complaint   Patient presents with     Physical     Annual well visit       Initial /74 (BP Location: Right arm, Patient Position: Sitting, Cuff Size: Adult Regular)   Pulse 69   Temp 98.8  F (37.1  C) (Temporal)   Resp 14   Wt 82 kg (180 lb 12.8 oz)   LMP  (LMP Unknown)   SpO2 95%   Breastfeeding No   BMI 34.16 kg/m   Estimated body mass index is 34.16 kg/m  as calculated from the following:    Height as of 8/10/22: 1.549 m (5' 1\").    Weight as of this encounter: 82 kg (180 lb 12.8 oz).     Medication Reconciliation: complete      FOOD SECURITY SCREENING QUESTIONS:    The next two questions are to help us understand your food security.  If you are feeling you need any assistance in this area, we have resources available to support you today.    Hunger Vital Signs:  Within the past 12 months we worried whether our food would run out before we got money to buy more. Never  Within the past 12 months the food we bought just didn't last and we didn't have money to get more. Never        Advance care plan reviewed      Keli Lee LPN on 5/25/2023 at 9:52 AM      "

## 2023-05-31 ENCOUNTER — MYC MEDICAL ADVICE (OUTPATIENT)
Dept: INTERNAL MEDICINE | Facility: OTHER | Age: 63
End: 2023-05-31
Payer: COMMERCIAL

## 2023-06-01 ENCOUNTER — TELEPHONE (OUTPATIENT)
Dept: SURGERY | Facility: OTHER | Age: 63
End: 2023-06-01

## 2023-06-01 NOTE — TELEPHONE ENCOUNTER
Screening Questions for the Scheduling of Screening Colonoscopies   (If Colonoscopy is diagnostic, Provider should review the chart before scheduling.)  Are you younger than 50 or older than 80?  NO  Do you take aspirin or fish oil?  ASPIRIN AND FISH OIL (if yes, tell patient to stop 1 week prior to Colonoscopy)  Do you take warfarin (Coumadin), clopidogrel (Plavix), apixaban (Eliquis), dabigatram (Pradaxa), rivaroxaban (Xarelto) or any blood thinner? NO  Do you use oxygen at home?  NO  Do you have kidney disease? NO  Are you on dialysis? NO  Have you had a stroke or heart attack in the last year? NO  Have you had a stent in your heart or any blood vessel in the last year? NO  Have you had a transplant of any organ? NO  Have you had a colonoscopy or upper endoscopy (EGD) before? NO  Date of scheduled EGD. 09/15/2023  Provider HOLM  Pharmacy [facility]

## 2023-06-13 ENCOUNTER — HOSPITAL ENCOUNTER (OUTPATIENT)
Dept: ULTRASOUND IMAGING | Facility: OTHER | Age: 63
Discharge: HOME | End: 2023-06-13
Attending: NURSE PRACTITIONER | Admitting: NURSE PRACTITIONER
Payer: COMMERCIAL

## 2023-06-13 ENCOUNTER — HOSPITAL ENCOUNTER (OUTPATIENT)
Dept: CT IMAGING | Facility: OTHER | Age: 63
Discharge: HOME | End: 2023-06-13
Attending: NURSE PRACTITIONER | Admitting: NURSE PRACTITIONER
Payer: COMMERCIAL

## 2023-06-13 DIAGNOSIS — Z82.3 FAMILY HISTORY OF STROKE: ICD-10-CM

## 2023-06-13 DIAGNOSIS — Q96.3 MOSAIC TURNER SYNDROME: ICD-10-CM

## 2023-06-13 DIAGNOSIS — Z82.49 FAMILY HISTORY OF ISCHEMIC HEART DISEASE: ICD-10-CM

## 2023-06-13 DIAGNOSIS — Z86.79 HISTORY OF SUPRAVENTRICULAR TACHYCARDIA: ICD-10-CM

## 2023-06-13 DIAGNOSIS — I51.7 CARDIOMEGALY: ICD-10-CM

## 2023-06-13 LAB
CV CALCIUM SCORE AGATSTON LM: 0
CV CALCIUM SCORING AGATSON LAD: 0
CV CALCIUM SCORING AGATSTON CX: 0
CV CALCIUM SCORING AGATSTON RCA: 0
CV CALCIUM SCORING AGATSTON TOTAL: 0

## 2023-06-13 PROCEDURE — 75571 CT HRT W/O DYE W/CA TEST: CPT

## 2023-06-13 PROCEDURE — 93880 EXTRACRANIAL BILAT STUDY: CPT

## 2023-06-14 ENCOUNTER — MYC MEDICAL ADVICE (OUTPATIENT)
Dept: INTERNAL MEDICINE | Facility: OTHER | Age: 63
End: 2023-06-14
Payer: COMMERCIAL

## 2023-06-14 DIAGNOSIS — I65.23 BILATERAL CAROTID ARTERY STENOSIS: Primary | ICD-10-CM

## 2023-07-10 ENCOUNTER — APPOINTMENT (OUTPATIENT)
Dept: CT IMAGING | Facility: OTHER | Age: 63
End: 2023-07-10
Attending: EMERGENCY MEDICINE
Payer: COMMERCIAL

## 2023-07-10 ENCOUNTER — NURSE TRIAGE (OUTPATIENT)
Dept: INTERNAL MEDICINE | Facility: OTHER | Age: 63
End: 2023-07-10
Payer: COMMERCIAL

## 2023-07-10 ENCOUNTER — HOSPITAL ENCOUNTER (EMERGENCY)
Facility: OTHER | Age: 63
Discharge: HOME | End: 2023-07-10
Attending: EMERGENCY MEDICINE | Admitting: EMERGENCY MEDICINE
Payer: COMMERCIAL

## 2023-07-10 VITALS
SYSTOLIC BLOOD PRESSURE: 118 MMHG | BODY MASS INDEX: 33.99 KG/M2 | OXYGEN SATURATION: 96 % | RESPIRATION RATE: 16 BRPM | WEIGHT: 180 LBS | HEIGHT: 61 IN | TEMPERATURE: 97.4 F | HEART RATE: 76 BPM | DIASTOLIC BLOOD PRESSURE: 77 MMHG

## 2023-07-10 DIAGNOSIS — H81.10 BENIGN PAROXYSMAL POSITIONAL VERTIGO, UNSPECIFIED LATERALITY: ICD-10-CM

## 2023-07-10 LAB
ALBUMIN SERPL BCG-MCNC: 4.5 G/DL (ref 3.5–5.2)
ALP SERPL-CCNC: 48 U/L (ref 35–104)
ALT SERPL W P-5'-P-CCNC: 40 U/L (ref 0–50)
ANION GAP SERPL CALCULATED.3IONS-SCNC: 15 MMOL/L (ref 7–15)
AST SERPL W P-5'-P-CCNC: 34 U/L (ref 0–45)
BASOPHILS # BLD AUTO: 0 10E3/UL (ref 0–0.2)
BASOPHILS NFR BLD AUTO: 0 %
BILIRUB SERPL-MCNC: 0.4 MG/DL
BUN SERPL-MCNC: 16.3 MG/DL (ref 8–23)
CALCIUM SERPL-MCNC: 8.9 MG/DL (ref 8.8–10.2)
CHLORIDE SERPL-SCNC: 104 MMOL/L (ref 98–107)
CREAT SERPL-MCNC: 0.64 MG/DL (ref 0.51–0.95)
DEPRECATED HCO3 PLAS-SCNC: 21 MMOL/L (ref 22–29)
EOSINOPHIL # BLD AUTO: 0 10E3/UL (ref 0–0.7)
EOSINOPHIL NFR BLD AUTO: 0 %
ERYTHROCYTE [DISTWIDTH] IN BLOOD BY AUTOMATED COUNT: 13.2 % (ref 10–15)
GFR SERPL CREATININE-BSD FRML MDRD: >90 ML/MIN/1.73M2
GLUCOSE SERPL-MCNC: 167 MG/DL (ref 70–99)
HCT VFR BLD AUTO: 39.6 % (ref 35–47)
HGB BLD-MCNC: 13.7 G/DL (ref 11.7–15.7)
HOLD SPECIMEN: NORMAL
HOLD SPECIMEN: NORMAL
IMM GRANULOCYTES # BLD: 0 10E3/UL
IMM GRANULOCYTES NFR BLD: 0 %
LYMPHOCYTES # BLD AUTO: 1.3 10E3/UL (ref 0.8–5.3)
LYMPHOCYTES NFR BLD AUTO: 11 %
MCH RBC QN AUTO: 31.5 PG (ref 26.5–33)
MCHC RBC AUTO-ENTMCNC: 34.6 G/DL (ref 31.5–36.5)
MCV RBC AUTO: 91 FL (ref 78–100)
MONOCYTES # BLD AUTO: 0.5 10E3/UL (ref 0–1.3)
MONOCYTES NFR BLD AUTO: 5 %
NEUTROPHILS # BLD AUTO: 9.6 10E3/UL (ref 1.6–8.3)
NEUTROPHILS NFR BLD AUTO: 84 %
NRBC # BLD AUTO: 0 10E3/UL
NRBC BLD AUTO-RTO: 0 /100
NT-PROBNP SERPL-MCNC: 52 PG/ML (ref 0–900)
PLATELET # BLD AUTO: 235 10E3/UL (ref 150–450)
POTASSIUM SERPL-SCNC: 3.5 MMOL/L (ref 3.4–5.3)
PROCALCITONIN SERPL IA-MCNC: 0.05 NG/ML
PROT SERPL-MCNC: 6.8 G/DL (ref 6.4–8.3)
RBC # BLD AUTO: 4.35 10E6/UL (ref 3.8–5.2)
SODIUM SERPL-SCNC: 140 MMOL/L (ref 136–145)
TROPONIN T SERPL HS-MCNC: <6 NG/L
WBC # BLD AUTO: 11.5 10E3/UL (ref 4–11)

## 2023-07-10 PROCEDURE — 70498 CT ANGIOGRAPHY NECK: CPT

## 2023-07-10 PROCEDURE — 250N000011 HC RX IP 250 OP 636: Performed by: EMERGENCY MEDICINE

## 2023-07-10 PROCEDURE — 80053 COMPREHEN METABOLIC PANEL: CPT | Performed by: INTERNAL MEDICINE

## 2023-07-10 PROCEDURE — 85025 COMPLETE CBC W/AUTO DIFF WBC: CPT | Performed by: EMERGENCY MEDICINE

## 2023-07-10 PROCEDURE — 36415 COLL VENOUS BLD VENIPUNCTURE: CPT | Performed by: INTERNAL MEDICINE

## 2023-07-10 PROCEDURE — 250N000013 HC RX MED GY IP 250 OP 250 PS 637: Performed by: EMERGENCY MEDICINE

## 2023-07-10 PROCEDURE — 83880 ASSAY OF NATRIURETIC PEPTIDE: CPT | Mod: GZ | Performed by: EMERGENCY MEDICINE

## 2023-07-10 PROCEDURE — 99285 EMERGENCY DEPT VISIT HI MDM: CPT | Mod: 25 | Performed by: EMERGENCY MEDICINE

## 2023-07-10 PROCEDURE — 93005 ELECTROCARDIOGRAM TRACING: CPT | Performed by: EMERGENCY MEDICINE

## 2023-07-10 PROCEDURE — 85025 COMPLETE CBC W/AUTO DIFF WBC: CPT | Performed by: INTERNAL MEDICINE

## 2023-07-10 PROCEDURE — 84484 ASSAY OF TROPONIN QUANT: CPT | Performed by: EMERGENCY MEDICINE

## 2023-07-10 PROCEDURE — 96360 HYDRATION IV INFUSION INIT: CPT | Mod: XU | Performed by: EMERGENCY MEDICINE

## 2023-07-10 PROCEDURE — 84484 ASSAY OF TROPONIN QUANT: CPT | Performed by: INTERNAL MEDICINE

## 2023-07-10 PROCEDURE — 99284 EMERGENCY DEPT VISIT MOD MDM: CPT | Performed by: EMERGENCY MEDICINE

## 2023-07-10 PROCEDURE — 93010 ELECTROCARDIOGRAM REPORT: CPT | Performed by: INTERNAL MEDICINE

## 2023-07-10 PROCEDURE — 70450 CT HEAD/BRAIN W/O DYE: CPT | Mod: XU

## 2023-07-10 PROCEDURE — 70496 CT ANGIOGRAPHY HEAD: CPT

## 2023-07-10 PROCEDURE — 80053 COMPREHEN METABOLIC PANEL: CPT | Performed by: EMERGENCY MEDICINE

## 2023-07-10 PROCEDURE — 84145 PROCALCITONIN (PCT): CPT | Performed by: EMERGENCY MEDICINE

## 2023-07-10 PROCEDURE — 258N000003 HC RX IP 258 OP 636: Performed by: EMERGENCY MEDICINE

## 2023-07-10 RX ORDER — SODIUM CHLORIDE 9 MG/ML
INJECTION, SOLUTION INTRAVENOUS CONTINUOUS PRN
Status: DISCONTINUED | OUTPATIENT
Start: 2023-07-10 | End: 2023-07-10 | Stop reason: HOSPADM

## 2023-07-10 RX ORDER — LABETALOL HYDROCHLORIDE 5 MG/ML
10 INJECTION, SOLUTION INTRAVENOUS EVERY 10 MIN PRN
Status: DISCONTINUED | OUTPATIENT
Start: 2023-07-10 | End: 2023-07-10 | Stop reason: HOSPADM

## 2023-07-10 RX ORDER — HYDRALAZINE HYDROCHLORIDE 20 MG/ML
10 INJECTION INTRAMUSCULAR; INTRAVENOUS EVERY 10 MIN PRN
Status: DISCONTINUED | OUTPATIENT
Start: 2023-07-10 | End: 2023-07-10 | Stop reason: HOSPADM

## 2023-07-10 RX ORDER — ONDANSETRON 4 MG/1
4 TABLET, ORALLY DISINTEGRATING ORAL EVERY 8 HOURS PRN
Qty: 20 TABLET | Refills: 0 | Status: SHIPPED | OUTPATIENT
Start: 2023-07-10

## 2023-07-10 RX ORDER — ONDANSETRON 4 MG/1
4 TABLET, ORALLY DISINTEGRATING ORAL EVERY 8 HOURS PRN
Qty: 5 TABLET | Refills: 0 | Status: CANCELLED | OUTPATIENT
Start: 2023-07-10

## 2023-07-10 RX ORDER — MECLIZINE HCL 12.5 MG 12.5 MG/1
25 TABLET ORAL ONCE
Status: COMPLETED | OUTPATIENT
Start: 2023-07-10 | End: 2023-07-10

## 2023-07-10 RX ORDER — MECLIZINE HYDROCHLORIDE 25 MG/1
25 TABLET ORAL 3 TIMES DAILY PRN
Qty: 10 TABLET | Refills: 0 | Status: SHIPPED | OUTPATIENT
Start: 2023-07-10 | End: 2023-07-10

## 2023-07-10 RX ORDER — IOPAMIDOL 755 MG/ML
100 INJECTION, SOLUTION INTRAVASCULAR ONCE
Status: COMPLETED | OUTPATIENT
Start: 2023-07-10 | End: 2023-07-10

## 2023-07-10 RX ORDER — MECLIZINE HYDROCHLORIDE 25 MG/1
25 TABLET ORAL 3 TIMES DAILY PRN
Qty: 20 TABLET | Refills: 0 | Status: SHIPPED | OUTPATIENT
Start: 2023-07-10

## 2023-07-10 RX ADMIN — SODIUM CHLORIDE 1000 ML: 9 INJECTION, SOLUTION INTRAVENOUS at 18:02

## 2023-07-10 RX ADMIN — IOPAMIDOL 75 ML: 755 INJECTION, SOLUTION INTRAVENOUS at 18:14

## 2023-07-10 RX ADMIN — MECLIZINE 25 MG: 12.5 TABLET ORAL at 19:11

## 2023-07-10 ASSESSMENT — ACTIVITIES OF DAILY LIVING (ADL)
ADLS_ACUITY_SCORE: 35
ADLS_ACUITY_SCORE: 35

## 2023-07-10 NOTE — ED TRIAGE NOTES
Pt here by [facility] into [facility], pt reportedly started feeling dizzy around 1030 this AM and has gotten progressively worse, pt is nauseated and unable to open her eyes, pt denies pain, VSS, pt received IV fluids and zofran per EMS   Triage Assessment     Row Name 07/10/23 1644       Triage Assessment (Adult)    Airway WDL WDL       Respiratory WDL    Respiratory WDL WDL       Skin Circulation/Temperature WDL    Skin Circulation/Temperature WDL WDL       Cardiac WDL    Cardiac WDL WDL       Peripheral/Neurovascular WDL    Peripheral Neurovascular WDL WDL       Cognitive/Neuro/Behavioral WDL    Cognitive/Neuro/Behavioral WDL WDL

## 2023-07-10 NOTE — TELEPHONE ENCOUNTER
Called patient and patient gave verbal ok to speak with .   states that he was in for colonoscopy today and patient came in room and stated she got dizzy.  States they went out for lunch and things are getting progressively worse.  States patient is nauseated/vomiting and he has to help patient to stand.  States she cant open her eyes because she is to dizzy.  Denies any trouble breathing , or any neurologic deficit.   not sure what heart rate is or BP.   states he had just discussed with patient that  he would bring her into ED or call ambulance.    advised to bring patient to ED now.  Patient states he can and states that they will call ambulance if any concerning change in the meantime.  States they will be here in 30 mins.  Called and updated ED.    Nahomy Gonzalez RN on 7/10/2023 at 2:59 PM    Reason for Disposition    SEVERE dizziness (e.g., unable to stand, requires support to walk, feels like passing out now)    Additional Information    Negative: Chest pain    Negative: Rectal bleeding, bloody stool, or tarry-black stool    Negative: Vomiting is main symptom    Negative: Diarrhea is main symptom    Negative: Headache is main symptom    Negative: Heat exhaustion suspected (i.e., dehydration from heat exposure)    Negative: Patient states that they are having an anxiety or panic attack    Negative: Dizziness from low blood sugar (i.e., < 60 mg/dl or 3.5 mmol/l)    Negative: SEVERE difficulty breathing (e.g., struggling for each breath, speaks in single words)    Negative: Shock suspected (e.g., cold/pale/clammy skin, too weak to stand, low BP, rapid pulse)    Negative: Difficult to awaken or acting confused (e.g., disoriented, slurred speech)    Negative: Fainted, and still feels dizzy afterwards    Negative: Overdose (accidental or intentional) of medications    Negative: New neurologic deficit that is present now: * Weakness of the face, arm, or leg on one side of the  "body * Numbness of the face, arm, or leg on one side of the body * Loss of speech or garbled speech    Negative: Heart beating < 50 beats per minute OR > 140 beats per minute    Negative: Sounds like a life-threatening emergency to the triager    Answer Assessment - Initial Assessment Questions  1. DESCRIPTION: \"Describe your dizziness.\"      *No Answer*  2. LIGHTHEADED: \"Do you feel lightheaded?\" (e.g., somewhat faint, woozy, weak upon standing)      *No Answer*  3. VERTIGO: \"Do you feel like either you or the room is spinning or tilting?\" (i.e. vertigo)      *No Answer*  4. SEVERITY: \"How bad is it?\"  \"Do you feel like you are going to faint?\" \"Can you stand and walk?\"    - MILD: Feels slightly dizzy, but walking normally.    - MODERATE: Feels unsteady when walking, but not falling; interferes with normal activities (e.g., school, work).    - SEVERE: Unable to walk without falling, or requires assistance to walk without falling; feels like passing out now.       *No Answer*  5. ONSET:  \"When did the dizziness begin?\"      *No Answer*  6. AGGRAVATING FACTORS: \"Does anything make it worse?\" (e.g., standing, change in head position)      *No Answer*  7. HEART RATE: \"Can you tell me your heart rate?\" \"How many beats in 15 seconds?\"  (Note: not all patients can do this)        *No Answer*  8. CAUSE: \"What do you think is causing the dizziness?\"      *No Answer*  9. RECURRENT SYMPTOM: \"Have you had dizziness before?\" If Yes, ask: \"When was the last time?\" \"What happened that time?\"      *No Answer*  10. OTHER SYMPTOMS: \"Do you have any other symptoms?\" (e.g., fever, chest pain, vomiting, diarrhea, bleeding)        *No Answer*  11. PREGNANCY: \"Is there any chance you are pregnant?\" \"When was your last menstrual period?\"        *No Answer*    Protocols used: DIZZINESS-A-OH    " independent

## 2023-07-10 NOTE — TELEPHONE ENCOUNTER
The patient's  called and stated the patient had vertigo.  She has never had this and is wondering what to do.  Please advise.

## 2023-07-10 NOTE — ED PROVIDER NOTES
"[facility]  Emergency Department Visit Note    Dizziness and Nausea      History of Present Illness     HPI:  Nikki Lezama is a 63 year old female presenting with vertigo. These symptoms started 6 hours ago. She has not had similar symptoms in the past. Vertigo is described as \"room spinning\" rather than light headedness. Symptoms are worsened by changes in position and are associated with nausea. No vomiting. She has a history of tinnitus in her left ear but no  hearing changes. No headache, fever, chills, chest pain, abdominal pain, weakness, numbness, difficulties with balance. There is no recent preceding illness. She has no symptoms at this time    Medications:  Prior to Admission medications    Medication Sig Last Dose Taking? Auth Provider Long Term End Date   albuterol (PROAIR HFA/PROVENTIL HFA/VENTOLIN HFA) 108 (90 Base) MCG/ACT inhaler Inhale 2 puffs into the lungs every 4 hours as needed for shortness of breath / dyspnea   Maren Hameed NP Yes    ALPRAZolam (XANAX) 0.5 MG tablet Take 1 tablet (0.5 mg) by mouth 2 times daily as needed for anxiety (for flying) For flying   Maren Hameed NP     cetirizine (ZYRTEC) 10 MG tablet Take 10 mg by mouth daily Alternates between Zyrtec, Claritin and Allegra   Reported, Patient     co-enzyme Q-10 100 MG CAPS capsule Take 100 mg by mouth daily   Reported, Patient     fexofenadine (ALLEGRA) 180 MG tablet Take 180 mg by mouth daily   Reported, Patient     FLUoxetine (PROZAC) 10 MG capsule Take 1 capsule (10 mg) by mouth daily   Maren Hameed NP Yes    guaiFENesin-codeine (ROBITUSSIN AC) 100-10 MG/5ML solution Take 5-10 mLs by mouth nightly as needed for cough   Maren Hameed NP     latanoprost (XALATAN) 0.005 % ophthalmic solution    Reported, Patient Yes    methylPREDNISolone (MEDROL DOSEPAK) 4 MG tablet therapy pack TAKE 6 TABLETS ON DAY 1 AS DIRECTED ON PACKAGE AND DECREASE BY 1 TAB EACH DAY FOR A TOTAL OF 6 DAYS   " Reported, Patient     metoprolol succinate ER (TOPROL XL) 25 MG 24 hr tablet TAKE 1 TABLET BY MOUTH EVERY DAY   Maren Hameed NP Yes    Misc Natural Products (GLUCOSAMINE CHOND COMPLEX/MSM PO) Take 1 tablet by mouth daily   Reported, Patient     montelukast (SINGULAIR) 10 MG tablet TAKE 1 TABLET BY MOUTH EVERY DAY   Maren Hameed NP Yes    Multiple Minerals-Vitamins (CALCIUM-MAGNESIUM-ZINC-D3) TABS Take 1 tablet by mouth daily    Reported, Patient     polyethylene glycol-electrolytes (NULYTELY) 420 g solution TAKE 4,000 ML'S BY MOUTH ONCE FOR 1 DOSE   Reported, Patient     probiotic CAPS Take 1 capsule by mouth daily   Reported, Patient     RA KRILL  MG CAPS Take 1 tablet by mouth daily   Reported, Patient     rosuvastatin (CRESTOR) 10 MG tablet TAKE 1 TABLET BY MOUTH EVERY DAY   Maren Hameed NP Yes    Spacer/Aero-Holding Chambers (AEROCHAMBER MINI CHAMBER) HORACE For home use.   Maren Hameed NP         Allergies:  Allergies   Allergen Reactions     Cats      Other reaction(s): Runny Nose     Dust Mites Other (See Comments) and Itching     Sneezing, watery eyes     Ragweeds      Runny Nose; Sneezing, watery eyes       Problem List:  Patient Active Problem List   Diagnosis     Esophageal reflux     Fatty liver disease, nonalcoholic     MODESTO (generalized anxiety disorder)     Health care maintenance     History of supraventricular tachycardia     Hyperlipidemia     Mechanical complication due to breast prosthesis     Mild persistent asthma without complication     Perennial allergic rhinitis     Sensorineural hearing loss, asymmetrical     Lumbar spondylosis     Mosaic Lino syndrome     Osteopenia of multiple sites     Morbid obesity (H)       Past Medical History:  Past Medical History:   Diagnosis Date     Allergic rhinitis 01/12/2007     Amblyopia of eye     left eye; patching and vision therapy.     Dysplasia of cervix uteri 01/12/2007    1980s; cryotherapy, Paps normal since      "Female infertility     gonadal dysgenesis     Gastro-esophageal reflux disease without esophagitis 02/29/2008     Glaucoma      Lateral epicondylitis of elbow 08/08/2014     Mild persistent asthma, uncomplicated 02/29/2008     Other mechanical complication of breast prosthesis and implant, initial encounter 02/29/2008    ruptured implants     SVT (supraventricular tachycardia) (H) 01/12/2007    controlled with metoprolol     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    Mosaic 46XY       Past Surgical History:  Past Surgical History:   Procedure Laterality Date     BIOPSY BREAST      1980,1982,benign     COLONOSCOPY  04/19/2010    hemorrhoids o/w nl to TI;rep 10yrs     COLONOSCOPY N/A 06/09/2021    serrated adenomas - f/u 6/9/22     COLONOSCOPY N/A 08/10/2022    normal, f/u 5 years due to history     MAMMOPLASTY AUGMENTATION       OOPHORECTOMY      streak ovaries     OTHER SURGICAL HISTORY      LIPOSUCTION     TONSILLECTOMY         Social History:  Social History     Tobacco Use     Smoking status: Never     Smokeless tobacco: Never   Vaping Use     Vaping Use: Never used   Substance Use Topics     Alcohol use: Yes     Alcohol/week: 0.0 standard drinks of alcohol     Comment: 3 per week     Drug use: No       Review of Systems:  Complete review of systems obtained and pertinent positive and negative findings noted in HPI. Review of systems otherwise negative.      Physical Exam     Vital signs: /77   Pulse 76   Temp 97.4  F (36.3  C) (Tympanic)   Resp 16   Ht 1.549 m (5' 1\")   Wt 81.6 kg (180 lb)   LMP  (LMP Unknown)   SpO2 96%   BMI 34.01 kg/m      Physical Exam:    General: awake and alert, uncomfortable  HEENT: atraumatic, no scleral injection, no nasal discharge, neck supple  Chest: clear to auscultation bilaterally without wheezes or crackles, non labored respirations, symmetric chest rise  Cardiovascular: regular rate and rhythm, no murmurs or gallops  Abdomen: soft, nontender, no rebound or guarding, " nondistended  Extremities: no deformities, edema, or tenderness  Skin: warm, dry, no rashes  Neuro: alert and oriented x 3, 5/5 bilateral hand , bicep, tricep, plantar/dorsiflexion, sensation intact to light touch bilateral hands and feet, ambulates without difficulty or ataxia, finger to nose normal bilaterally, CN II-XII intact including VIII grossly symmetric and intact, no skew deviation, No symptoms elicited by Samir-Hallpike       Medical Decision Making & ED Course     Nikki Lezama is a 63 year old female presenting with vertigo. Differential includes benign paroxysmal positional vertigo, labyrinthitis, vestibular neuritis, cerebrovascular accident, transient ischemic attack, meniere's, herpes zoster oticus, intracranial hemorrhage, intracranial mass, metabolic abnormality. Given the benign neurologic exam without focal deficits, lack of recent preceding illness, and aggravation of symptoms with changes in head positioning, this vertigo likely represents benign paroxysmal positional vertigo. However due to the patient's age  the patietn does require emergent imaging to rule out an intracranial process. This was done and was negative. Will treat with meclizine and close primary care provider follow up. She is stable for further outpatient management. Patient given instructions on follow-up and warning signs for which to return to ED. All questions were answered and the patient is comfortable with plan for discharge. The patient was discharged in stable condition.    I have reviewed the patients ECG, laboratory studies, imaging, and medical records.  .    Diagnosis & Disposition    Diagnosis:  1. Benign paroxysmal positional vertigo, unspecified laterality        Disposition:  Home       Yenifer Durbin MD  07/11/23 [id number]

## 2023-07-11 LAB
ATRIAL RATE - MUSE: 65 BPM
DIASTOLIC BLOOD PRESSURE - MUSE: NORMAL MMHG
INTERPRETATION ECG - MUSE: NORMAL
P AXIS - MUSE: 33 DEGREES
PR INTERVAL - MUSE: 114 MS
QRS DURATION - MUSE: 88 MS
QT - MUSE: 434 MS
QTC - MUSE: 451 MS
R AXIS - MUSE: -32 DEGREES
SYSTOLIC BLOOD PRESSURE - MUSE: NORMAL MMHG
T AXIS - MUSE: -73 DEGREES
VENTRICULAR RATE- MUSE: 65 BPM

## 2023-08-02 ENCOUNTER — THERAPY VISIT (OUTPATIENT)
Dept: PHYSICAL THERAPY | Facility: OTHER | Age: 63
End: 2023-08-02
Attending: EMERGENCY MEDICINE
Payer: COMMERCIAL

## 2023-08-02 DIAGNOSIS — H81.10 BENIGN PAROXYSMAL POSITIONAL VERTIGO, UNSPECIFIED LATERALITY: ICD-10-CM

## 2023-08-02 PROCEDURE — 97161 PT EVAL LOW COMPLEX 20 MIN: CPT | Mod: GP

## 2023-08-02 PROCEDURE — 97530 THERAPEUTIC ACTIVITIES: CPT | Mod: GP

## 2023-08-02 NOTE — PROGRESS NOTES
PHYSICAL THERAPY EVALUATION  Type of Visit: Evaluation    See electronic medical record for Abuse and Falls Screening details.    Subjective       Presenting condition or subjective complaint: Started off being slightly off balance/stumbling; laid down to rest and when she woke up she was dizzy, throwing up and couldn't get up off the floor, called the ambulance and went into ER, got Zofran for nausea with improvement. Flew to [place] the next day and everything has been back to normal.  A week before that, did hit her face/nose with the door of the dryer. One fall years ago with ocular impairment following but no LOC. Family hx of stroke and acoustic neuroma.  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  Hobbies/Interests: music    Patient goals for therapy: nothing, learn how to prevent it from happening again,    Pain assessment: Pain present in back/neck, does not rate     Objective   Cognitive Status Examination  Orientation: Oriented to person, place and time     OBSERVATION: Pt demonstrates good balance and ability to move head and transition between positions without issue  BED MOBILITY: Independent    TRANSFERS: Independent      GAIT:   Level of Day: Independent  Assistive Device(s): None      BALANCE:  not impaired  30 seconds all  conditions mCTSIB, mild increase in sway condition 5         VESTIBULAR EVALUATION  ADDITIONAL HISTORY:  Description of symptoms: Off balance; Nausea or vomiting; I feel like I am spinning; Blurry or jumping vision  Dizzy attacks:   Start: July 10, 2023   Last attack: July 10, 2023   Frequency of occurrences: just that one day   Length of attack: a few hours would happen whenever she opened her eyes  Difficulty hearing: Both ears  Noise in ears? Yes tinnitus/ringingYes, tinnitus chronically  Alleviates symptoms: laying on floor, closing eyes  Worsens symptoms: opening eyes, movement       Pertinent visual history: cross eyed as a child; glaucoma  Pertinent history of current vestibular problem: Anxiety, Hearing loss, Migraines, Prior concussion(s)  DHI:  not completed    Cervicogenic Screen    Neck ROM Normal   Vertebral Artery Test Normal     Oculomotor Screen    Ocular ROM Normal   Smooth Pursuit Normal   Saccades Normal   VOR    VOR Cancellation Normal   Head Impulse Test Normal   Convergence Testing Normal        Infrared Goggle Exam Vestibular Suppressant in Last 24 Hours? No  Exam Completed With: Infrared goggles   Spontaneous Nystagmus Negative   Gaze Evoked Nystagmus Negative   Head Shake Horizontal Nystagmus Negative, vertical negative   Positional Testing Negative   Supine Head-Hanging Test     Left Right   Boyden-Hallpike Negative Negative   Sidelying Test     [facility] Sup[facility] Roll Test Negative Negative   BPPV Canal(s):  No active BPPV evident    Assessment & Plan   CLINICAL IMPRESSIONS  Medical Diagnosis: Benign paroxysmal positional vertigo, unspecified laterality (H81.10)    Treatment Diagnosis: impaired vestibular function   Impression/Assessment: Patient is a 63 year old female with vertigo complaints.  The following significant findings have been identified: previously experiencing dizziness, instability, disequilibrium but symptoms have since resolved and she is not currently having any issues.      Clinical Decision Making (Complexity):  Clinical Presentation: Stable/Uncomplicated  Clinical Presentation Rationale: based on medical and personal factors listed in PT evaluation  Clinical Decision Making (Complexity): Low complexity    PLAN OF CARE  Treatment Interventions:  Evaluation and pt education    Long Term Goals     PT Goal 1  Goal Identifier: education  Goal Description: Pt will be educated in results of testing and verbalize understanding and aggreement with prescribed plan of care to manage symptoms and prevent reoccurance.  Rationale: to maximize safety and independence with performance of ADLs and functional tasks  Target Date: 08/02/23  Date Met: 08/02/23      Frequency of Treatment: 1 visit  Duration of Treatment: 1 week    Recommended Referrals to Other Professionals:   Education Assessment:   Learner/Method: Patient    Risks and benefits of evaluation/treatment have been explained.   Patient/Family/caregiver agrees with Plan of Care.     Evaluation Time:     PT Eval, Low Complexity Minutes (12694): 40     Signing Clinician: Elizabeth Horner PT

## 2023-08-03 ENCOUNTER — OFFICE VISIT (OUTPATIENT)
Dept: CARDIOLOGY | Facility: OTHER | Age: 63
End: 2023-08-03
Attending: NURSE PRACTITIONER
Payer: COMMERCIAL

## 2023-08-03 VITALS
OXYGEN SATURATION: 97 % | HEART RATE: 64 BPM | BODY MASS INDEX: 32.55 KG/M2 | TEMPERATURE: 97.8 F | RESPIRATION RATE: 14 BRPM | DIASTOLIC BLOOD PRESSURE: 82 MMHG | SYSTOLIC BLOOD PRESSURE: 134 MMHG | WEIGHT: 172.4 LBS | HEIGHT: 61 IN

## 2023-08-03 DIAGNOSIS — E78.5 HYPERLIPIDEMIA LDL GOAL <70: ICD-10-CM

## 2023-08-03 DIAGNOSIS — Q96.3 MOSAIC TURNER SYNDROME: ICD-10-CM

## 2023-08-03 DIAGNOSIS — I51.7 CARDIOMEGALY: Primary | ICD-10-CM

## 2023-08-03 DIAGNOSIS — R06.09 DOE (DYSPNEA ON EXERTION): ICD-10-CM

## 2023-08-03 DIAGNOSIS — Z86.79 HISTORY OF SUPRAVENTRICULAR TACHYCARDIA: ICD-10-CM

## 2023-08-03 DIAGNOSIS — I65.21 STENOSIS OF RIGHT CAROTID ARTERY: ICD-10-CM

## 2023-08-03 DIAGNOSIS — Z82.49 FAMILY HISTORY OF ISCHEMIC HEART DISEASE: ICD-10-CM

## 2023-08-03 DIAGNOSIS — Z82.3 FAMILY HISTORY OF STROKE: ICD-10-CM

## 2023-08-03 PROCEDURE — 93000 ELECTROCARDIOGRAM COMPLETE: CPT | Performed by: INTERNAL MEDICINE

## 2023-08-03 PROCEDURE — 99204 OFFICE O/P NEW MOD 45 MIN: CPT | Performed by: NURSE PRACTITIONER

## 2023-08-03 ASSESSMENT — PAIN SCALES - GENERAL: PAINLEVEL: NO PAIN (0)

## 2023-08-03 NOTE — PROGRESS NOTES
[facility] HEART CARE   CARDIOLOGY CONSULT     Nikki Lezama   [address] [facility] [id number]    [place]     Chief Complaint   Patient presents with    New Patient     Referral         HPI:   Mrs. Lezama is a 63-year-old female who presents for cardiology evaluation  with history of SVT and family history significant for premature CAD.  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     Patient reports family history significant for ischemic heart disease. Both parents with CABG around 70 yoa. Mother had heart valve replaced d/t rheumatic fever. She has multiple siblings with cardiac disease. Oldest sibling with CHF and 3V CABG, just passed away. Younger sister with MI and multiple coronary stents placed, younger brother with MI's, first in his 30's yoa.     Recent CT coronary calcium score for risk factor identification on 6/13/2023, this was a normal study with a total coronary calcium score of 0, this places her at low risk. She had a prior CT coronary calcium scan in 2014 with a total coronary calcium score of 0.    Carotid ultrasound on 6/13/2023 revealing a 50 to 69% stenosis of both the right and left internal carotid arteries. CT angiogram on 7/10/2023 revealing localized narrowing involving the petrous portion of the right internal carotid artery without significant stenosis. She was referred to vascular.     Recent episode of BPPV, vertigo symptoms. She did see PT for this, resolution of symptoms.     Patient does have history mosaic Lino syndrome, no aortic abnormalities or coarctation is identified.    Patient with history of PSVT, had previously been evaluated by electrophysiology.  Ablation was not recommended at that time.  She admits to about 4 episodes of SVT per year.  She describes a recent episode lasting for over 1 hour.  She has been on Toprol-XL 25 mg daily which was  started by EP for suppression a few years back.     Today, patient denies experiencing any chest pain or pressure. No pain in the arm, jaw, neck or back. Rare palpitations as reported above. No lightheadedness or syncope. Positive for increased exertional dyspnea. No edema.     RELEVANT TESTING REVIEWED:  Echocardiogram 7/14/2022  Interpretation Summary  Technically difficult study due to pectus excavatum.  Left ventricular function is borderline reduced. The ejection fraction is 50-  55%.  Global right ventricular function is normal.  No hemodynamically significant valve abnormalities.  The inferior vena cava is normal.  There is no prior study for direct comparison.    PAST MEDICAL HISTORY:   Past Medical History:   Diagnosis Date    Allergic rhinitis 01/12/2007    Amblyopia of eye     left eye; patching and vision therapy.    Dysplasia of cervix uteri 01/12/2007    1980s; cryotherapy, Paps normal since    Female infertility     gonadal dysgenesis    Gastro-esophageal reflux disease without esophagitis 02/29/2008    Glaucoma     Lateral epicondylitis of elbow 08/08/2014    Mild persistent asthma, uncomplicated 02/29/2008    Other mechanical complication of breast prosthesis and implant, initial encounter 02/29/2008    ruptured implants    SVT (supraventricular tachycardia) (H) 01/12/2007    controlled with metoprolol    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    Mosaic 46XY          FAMILY HISTORY:   Family History   Problem Relation Age of Onset    Thyroid Disease Mother     Heart Disease Mother     Cancer Mother         Cancer,skin    Diabetes Mother     Hyperlipidemia Mother     Hypertension Mother     Other - See Comments Mother         Stroke    Diabetes Father     Heart Disease Father     Hyperlipidemia Father     Hypertension Father     Other - See Comments Father 73        Stroke    Hyperlipidemia Sister     Hypertension Sister     Diabetes Sister     Obesity Sister     Other - See Comments Sister         COVID 19     Hyperlipidemia Sister     Diabetes Sister     Obesity Sister     Asthma Sister     Hyperlipidemia Sister     Hypertension Sister     Myocardial Infarction Sister     Hyperlipidemia Brother         passed at age 71    Myocardial Infarction Brother     Hypertension Brother     Obesity Brother     Cerebrovascular Disease Brother     Hyperlipidemia Brother     Myocardial Infarction Brother         x3    Hypertension Brother     Obesity Brother     Diabetes Maternal Grandmother         Diabetes    Other - See Comments Maternal Grandmother         Stroke    Diabetes Paternal Grandmother         Diabetes    Other - See Comments Paternal Grandfather         Stroke    Anesthesia Reaction No family hx of         Anesthesia Problem    Blood Disease No family hx of         Blood Disease          PAST SURGICAL HISTORY:   Past Surgical History:   Procedure Laterality Date    BIOPSY BREAST      1980,1982,benign    COLONOSCOPY  04/19/2010    hemorrhoids o/w nl to TI;rep 10yrs    COLONOSCOPY N/A 06/09/2021    serrated adenomas - f/u 6/9/22    COLONOSCOPY N/A 08/10/2022    normal, f/u 5 years due to history    MAMMOPLASTY AUGMENTATION      OOPHORECTOMY      streak ovaries    OTHER SURGICAL HISTORY      LIPOSUCTION    TONSILLECTOMY            SOCIAL HISTORY:   Social History     Socioeconomic History    Marital status:      Spouse name: None    Number of children: None    Years of education: None    Highest education level: None   Tobacco Use    Smoking status: Never    Smokeless tobacco: Never   Vaping Use    Vaping Use: Never used   Substance and Sexual Activity    Alcohol use: Yes     Alcohol/week: 0.0 standard drinks of alcohol     Comment: 3 per week    Drug use: No    Sexual activity: Yes     Partners: Male   Social History Narrative    Retired to [employer] 2014. 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  2 adopted children, Francia ([place]); Danna ([place]) has 3 kids.  Both kids with special needs.          CURRENT  MEDICATIONS:   Prior to Admission medications    Medication Sig Start Date End Date Taking? Authorizing Provider   albuterol (PROAIR HFA/PROVENTIL HFA/VENTOLIN HFA) 108 (90 Base) MCG/ACT inhaler Inhale 2 puffs into the lungs every 4 hours as needed for shortness of breath / dyspnea 8/6/21  Yes Maren Hameed NP   ALPRAZolam (XANAX) 0.5 MG tablet Take 1 tablet (0.5 mg) by mouth 2 times daily as needed for anxiety (for flying) For flying 5/25/23  Yes Maren Hameed NP   cetirizine (ZYRTEC) 10 MG tablet Take 10 mg by mouth daily Alternates between Zyrtec, Claritin and Allegra   Yes Reported, Patient   co-enzyme Q-10 100 MG CAPS capsule Take 100 mg by mouth daily 10/30/13  Yes Reported, Patient   fexofenadine (ALLEGRA) 180 MG tablet Take 180 mg by mouth daily 8/16/10  Yes Reported, Patient   FLUoxetine (PROZAC) 10 MG capsule Take 1 capsule (10 mg) by mouth daily 5/25/23  Yes Maren Hameed NP   guaiFENesin-codeine (ROBITUSSIN AC) 100-10 MG/5ML solution Take 5-10 mLs by mouth nightly as needed for cough 12/7/22  Yes Maren Hameed NP   latanoprost (XALATAN) 0.005 % ophthalmic solution  9/7/18  Yes Reported, Patient   meclizine (ANTIVERT) 25 MG tablet Take 1 tablet (25 mg) by mouth 3 times daily as needed for dizziness 7/10/23  Yes Yenifer Durbin MD   methylPREDNISolone (MEDROL DOSEPAK) 4 MG tablet therapy pack TAKE 6 TABLETS ON DAY 1 AS DIRECTED ON PACKAGE AND DECREASE BY 1 TAB EACH DAY FOR A TOTAL OF 6 DAYS 10/24/22  Yes Reported, Patient   metoprolol succinate ER (TOPROL XL) 25 MG 24 hr tablet TAKE 1 TABLET BY MOUTH EVERY DAY 4/20/23  Yes Maren Hameed NP   Misc Natural Products (GLUCOSAMINE CHOND COMPLEX/MSM PO) Take 1 tablet by mouth daily   Yes Reported, Patient   montelukast (SINGULAIR) 10 MG tablet TAKE 1 TABLET BY MOUTH EVERY DAY 4/20/23  Yes Maren Hameed, NP   Multiple Minerals-Vitamins (CALCIUM-MAGNESIUM-ZINC-D3) TABS Take 1 tablet by mouth daily    Yes Reported, Patient    ondansetron (ZOFRAN ODT) 4 MG ODT tab Take 1 tablet (4 mg) by mouth every 8 hours as needed for nausea 7/10/23  Yes Yenifer Durbin MD   polyethylene glycol-electrolytes (NULYTELY) 420 g solution TAKE 4,000 ML'S BY MOUTH ONCE FOR 1 DOSE 5/20/22  Yes Reported, Patient   probiotic CAPS Take 1 capsule by mouth daily 6/2/14  Yes Reported, Patient   RA KRILL  MG CAPS Take 1 tablet by mouth daily 10/30/13  Yes Reported, Patient   rosuvastatin (CRESTOR) 10 MG tablet TAKE 1 TABLET BY MOUTH EVERY DAY 4/20/23  Yes Maren Hameed NP   Spacer/Aero-Holding Chambers (AEROCHAMBER MINI CHAMBER) HORACE For home use. 8/6/21  Yes Maren Hameed NP          ALLERGIES:   Allergies   Allergen Reactions    Cats Other (See Comments)     Other reaction(s): Runny Nose    Dust Mites Other (See Comments) and Itching     Sneezing, watery eyes    Ragweeds Other (See Comments)     Runny Nose; Sneezing, watery eyes          ROS:   CONSTITUTIONAL: No reported fever or chills. No changes in weight.  ENT: No visual disturbance, ear ache, epistaxis or sore throat.   CARDIOVASCULAR: No chest pain, chest pressure or chest discomfort. Rare palpitations, no lower extremity edema.   RESPIRATORY: Positive for increased dyspnea upon exertion. No cough, wheezing or hemoptysis.   GI: No reported abdominal pain.   : No reported hematuria or dysuria.   NEUROLOGICAL: No lightheadedness, dizziness, syncope, ataxia, paresthesias or weakness.   HEMATOLOGIC: No history of anemia. No bleeding or excessive bruising. No history of blood clots.   MUSCULOSKELETAL: No new joint pain or swelling, no muscle pain.  ENDOCRINOLOGIC: No temperature intolerance. No hair or skin changes.  SKIN: No abnormal rashes or sores, no unusual itching.  PSYCHIATRIC: No history of depression or anxiety. No changes in mood, feeling down or anxious. No changes in sleep.      PHYSICAL EXAM:   /82 (BP Location: Left arm, Patient Position: Sitting, Cuff Size: Adult  "Regular)   Pulse 64   Temp 97.8  F (36.6  C) (Temporal)   Resp 14   Ht 1.549 m (5' 1\")   Wt 78.2 kg (172 lb 6.4 oz)   LMP  (LMP Unknown)   SpO2 97%   BMI 32.57 kg/m    GENERAL: The patient is a well-developed, well-nourished, in no apparent distress.  HEENT: Head is normocephalic and atraumatic. Eyes are symmetrical with normal visual tracking. No icterus, no xanthelasmas. Nares appeared normal without nasal drainage. Mucous membranes are moist, no cyanosis.  NECK: Supple, no cervical bruits, JVP not visible.   CHEST/ LUNGS: Lungs clear to auscultation, no rales, rhonchi or wheezes, no use of accessory muscles, no retractions, respirations unlabored and normal respiratory rate.   CARDIO: Regular rate and rhythm normal with S1 and S2, no S3 or S4 and no murmur, click or rub. Precordium quiet with normal PMI.    ABD: Abdomen is nondistended. Aorta not enlarged to palpitation and no abdominal bruits heard.   EXTREMITIES: No clubbing, cyanosis or edema present. Pedal pulses normal and equal bilaterally.  MUSCULOSKELETAL: No visible joint swelling.   NEUROLOGIC: Alert and oriented X3. Normal speech, gait and affect. No focal neurologic deficits.   SKIN: No jaundice. No rashes or visible skin lesions present. No ecchymosis.     EKG:    Possible ectopic bradycardia, left axis deviation, nonspecific T wave abnormality. Rate 57 bpm.    LAB RESULTS:   Office Visit on 05/25/2023   Component Date Value Ref Range Status    TSH 05/25/2023 1.08  0.30 - 4.20 uIU/mL Final    Cholesterol 05/25/2023 204 (H)  <200 mg/dL Final    Triglycerides 05/25/2023 123  <150 mg/dL Final    Direct Measure HDL 05/25/2023 67  >=50 mg/dL Final    LDL Cholesterol Calculated 05/25/2023 112 (H)  <=100 mg/dL Final    Non HDL Cholesterol 05/25/2023 137 (H)  <130 mg/dL Final    Hemoglobin A1C 05/25/2023 6.0  4.0 - 6.2 % Final    Sodium 05/25/2023 140  136 - 145 mmol/L Final    Potassium 05/25/2023 4.4  3.4 - 5.3 mmol/L Final    Chloride 05/25/2023 " 103  98 - 107 mmol/L Final    Carbon Dioxide (CO2) 05/25/2023 26  22 - 29 mmol/L Final    Anion Gap 05/25/2023 11  7 - 15 mmol/L Final    Urea Nitrogen 05/25/2023 17.0  8.0 - 23.0 mg/dL Final    Creatinine 05/25/2023 0.66  0.51 - 0.95 mg/dL Final    Calcium 05/25/2023 10.2  8.8 - 10.2 mg/dL Final    Glucose 05/25/2023 111 (H)  70 - 99 mg/dL Final    Alkaline Phosphatase 05/25/2023 74  35 - 104 U/L Final    AST 05/25/2023 40 (H)  10 - 35 U/L Final    ALT 05/25/2023 60 (H)  10 - 35 U/L Final    Protein Total 05/25/2023 7.5  6.4 - 8.3 g/dL Final    Albumin 05/25/2023 4.6  3.5 - 5.2 g/dL Final    Bilirubin Total 05/25/2023 0.4  <=1.2 mg/dL Final    GFR Estimate 05/25/2023 >90  >60 mL/min/1.73m2 Final    Creatinine Urine mg/dL 05/25/2023 91.3  mg/dL Final    Albumin Urine mg/L 05/25/2023 <12.0  mg/L Final    Albumin Urine mg/g Cr 05/25/2023    Final    WBC Count 05/25/2023 6.9  4.0 - 11.0 10e3/uL Final    RBC Count 05/25/2023 4.64  3.80 - 5.20 10e6/uL Final    Hemoglobin 05/25/2023 14.7  11.7 - 15.7 g/dL Final    Hematocrit 05/25/2023 43.4  35.0 - 47.0 % Final    MCV 05/25/2023 94  78 - 100 fL Final    MCH 05/25/2023 31.7  26.5 - 33.0 pg Final    MCHC 05/25/2023 33.9  31.5 - 36.5 g/dL Final    RDW 05/25/2023 13.3  10.0 - 15.0 % Final    Platelet Count 05/25/2023 242  150 - 450 10e3/uL Final    % Neutrophils 05/25/2023 51  % Final    % Lymphocytes 05/25/2023 36  % Final    % Monocytes 05/25/2023 9  % Final    % Eosinophils 05/25/2023 3  % Final    % Basophils 05/25/2023 1  % Final    % Immature Granulocytes 05/25/2023 0  % Final    NRBCs per 100 WBC 05/25/2023 0  <1 /100 Final    Absolute Neutrophils 05/25/2023 3.5  1.6 - 8.3 10e3/uL Final    Absolute Lymphocytes 05/25/2023 2.5  0.8 - 5.3 10e3/uL Final    Absolute Monocytes 05/25/2023 0.6  0.0 - 1.3 10e3/uL Final    Absolute Eosinophils 05/25/2023 0.2  0.0 - 0.7 10e3/uL Final    Absolute Basophils 05/25/2023 0.1  0.0 - 0.2 10e3/uL Final    Absolute Immature Granulocytes  05/25/2023 0.0  <=0.4 10e3/uL Final    Absolute NRBCs 05/25/2023 0.0  10e3/uL Final          ASSESSMENT:   Nikki Lezama presents for cardiology evaluation  with history of SVT and family history significant for premature CAD.  She has a history of hyperlipidemia, GERD, obesity, osteopenia, Mosaic Lino Syndrome diagnosed at [facility], iron deficiency anemia, prediabetes, RAMÍREZ, and GERD.   Today, patient denies experiencing any chest pain or pressure. No pain in the arm, jaw, neck or back. Rare palpitations as reported above. No lightheadedness or syncope. Positive for increased exertional dyspnea. No edema.     PLAN:   1. Cardiomegaly  Recent imaging with notation of possible mild cardiomyopathy.   Recommended echocardiogram.     - Echocardiogram Complete; Future    2. ZARATE (dyspnea on exertion)  Multiple CAD risk factors, low risk coronary calcium score.   Positive for ZARATE.   She will be scheduled NM Lexiscan stress test and Echocardiogram.     - Echocardiogram Complete; Future  - NM MPI with Lexiscan; Future    3. History of supraventricular tachycardia  Continue to monitor symptoms of SVT, continue current beta blocker suppression.   Consider EP consult for possible ablation if progression of palpitations.     4. Mosaic Lino syndrome  CTA to screen aorta.  normal bilateral upper extremity pressures, no evidence of coarctation identified.    - Adult Cardiology al [facility] Referral  - CTA Chest Abdomen with Contrast; Future    5. Family history of ischemic heart disease  - NM MPI with Lexiscan; Future    6. Stenosis of right carotid artery  She has been scheduled to see vascular surgery.     7. Hyperlipidemia LDL goal <70  Increase Rosuvastatin to 20 mg daily.    Orders Placed This Encounter   Procedures    CTA Chest Abdomen with Contrast    EKG 12-lead, tracing only (Same Day)    Echocardiogram Complete       Follow-up with cardiology in 6-8 weeks to review results of above testing.     Thank you for allowing me  to participate in the care of your patient. Please do not hesitate to contact me if you have any questions.     Total time 56 min on date of encounter spent reviewing records, face to face time obtaining HPI, physical exam, reviewing results of recent testing and counseling on the above diagnoses and recommended plan of care.     Belen Warren, APRN CNP [facility]

## 2023-08-03 NOTE — PATIENT INSTRUCTIONS
You will receive a phone call to schedule CT angiogram of your chest to evaluate aorta, stress test and echocardiogram to follow-up on mild enlargement of your heart.   Increase Crestor to 20 mg daily, take at bedtime.   Follow-up with cardiology clinic in about 6-8 weeks to review results of above testing.

## 2023-08-03 NOTE — NURSING NOTE
"Chief Complaint   Patient presents with    New Patient     Referral        Initial /84 (BP Location: Right arm, Patient Position: Sitting, Cuff Size: Adult Regular)   Pulse 64   Temp 97.8  F (36.6  C) (Temporal)   Resp 14   Ht 1.549 m (5' 1\")   Wt 78.2 kg (172 lb 6.4 oz)   LMP  (LMP Unknown)   SpO2 97%   BMI 32.57 kg/m   Estimated body mass index is 32.57 kg/m  as calculated from the following:    Height as of this encounter: 1.549 m (5' 1\").    Weight as of this encounter: 78.2 kg (172 lb 6.4 oz).  Meds Reconciled: complete  Pt is not on Aspirin  Pt is on a Statin  Pt is not on Xarelto or Eliquis  Pt is not on a Warfarin   PHQ and/or MODESTO reviewed. Pt referred to PCP/MH Provider as appropriate.    Irish Caballero LPN     "

## 2023-08-06 LAB
ATRIAL RATE - MUSE: 57 BPM
DIASTOLIC BLOOD PRESSURE - MUSE: NORMAL MMHG
INTERPRETATION ECG - MUSE: NORMAL
P AXIS - MUSE: -45 DEGREES
PR INTERVAL - MUSE: 130 MS
QRS DURATION - MUSE: 88 MS
QT - MUSE: 402 MS
QTC - MUSE: 391 MS
R AXIS - MUSE: -40 DEGREES
SYSTOLIC BLOOD PRESSURE - MUSE: NORMAL MMHG
T AXIS - MUSE: 19 DEGREES
VENTRICULAR RATE- MUSE: 57 BPM

## 2023-08-11 ENCOUNTER — MYC MEDICAL ADVICE (OUTPATIENT)
Dept: CARDIOLOGY | Facility: OTHER | Age: 63
End: 2023-08-11
Payer: COMMERCIAL

## 2023-08-16 NOTE — TELEPHONE ENCOUNTER
Please notify patient no acute findings on this ECG tracing, tracing most fitting with sinus bradycardia. The stress test will have ECG tracing which will provide us more information. Please proceed with stress test and echocardiogram which was recommended during her visit.   Thanks,   MAKSIM Mederos CNP

## 2023-08-21 ENCOUNTER — TELEPHONE (OUTPATIENT)
Dept: CARDIOLOGY | Facility: OTHER | Age: 63
End: 2023-08-21
Payer: COMMERCIAL

## 2023-08-21 DIAGNOSIS — E78.2 MIXED HYPERLIPIDEMIA: ICD-10-CM

## 2023-08-21 NOTE — TELEPHONE ENCOUNTER
The patient stated they did not have interpretation of EKG.  She also had questions about a med that was supposed to be sent to the pharmacy at increased dose f a med she is already taking.

## 2023-08-22 RX ORDER — ROSUVASTATIN CALCIUM 20 MG/1
20 TABLET, COATED ORAL AT BEDTIME
Qty: 90 TABLET | Refills: 3 | Status: SHIPPED | OUTPATIENT
Start: 2023-08-22 | End: 2024-05-28

## 2023-08-22 NOTE — TELEPHONE ENCOUNTER
Previously responded to ECG tracing, see my chart encounter from 8/16. I did place new order for Crestor to patients pharmacy.   Thanks,   MAKSIM Mederos CNP

## 2023-08-22 NOTE — TELEPHONE ENCOUNTER
"Pending Prescriptions:                       Disp   Refills    rosuvastatin (CRESTOR) 10 MG tablet                           Sig: Take 1 tablet (10 mg) by mouth daily     When patient was seen on 8/3/2023, she was instructed to increase her Crestor from 10 mg's to 20 mg's at bedtime. Patient stated that a new prescription was not sent to her pharmacy, [facility]. She also received the interpretation of her EKG through My Chart that was also completed on 8/3/2023 and does not understand the the interpretation. She would like it more in \"lamens terms.\"     Please advise new Rx for Crestor to 20 mg's at bedtime and EKG explanation. Thank you.    Irish Caballero LPN on 8/22/2023 at 9:12 AM        "

## 2023-08-23 ENCOUNTER — MYC MEDICAL ADVICE (OUTPATIENT)
Dept: CARDIOLOGY | Facility: OTHER | Age: 63
End: 2023-08-23
Payer: COMMERCIAL

## 2023-08-23 NOTE — TELEPHONE ENCOUNTER
Patient has been notified via My Chart in response to her questions.     Irish Caballero LPN on 8/23/2023 at 8:33 AM

## 2023-08-31 ENCOUNTER — TELEPHONE (OUTPATIENT)
Dept: VASCULAR SURGERY | Facility: CLINIC | Age: 63
End: 2023-08-31
Payer: COMMERCIAL

## 2023-08-31 NOTE — TELEPHONE ENCOUNTER
Notified patient that she cannot be seen on 9/7 anymore.  I told her we moved her to 9/12 with check in at 3:30PM but she said that will not work.  She is driving and cannot let me know if 9/18 with check in at 8:15 will work or not.  Asked scheduling to move patient in the interim in case it does work.  Patient will call back once she knows.  Annabelle Poon RN......August 31, 2023...3:43 PM

## 2023-08-31 NOTE — TELEPHONE ENCOUNTER
Dr. Bueno is not going to be in surgery all day on 9/7/23 so patient will need to move her appointment.  There is an available appointment for 9/12/23 with 3:30PM check in to see Dr. Bueno at 4PM via telemedicine.  Left message to call back at mobile number.  Home number doesn't ring at all.   Annabelle Poon RN......August 31, 2023...10:40 AM

## 2023-09-01 ENCOUNTER — HOSPITAL ENCOUNTER (OUTPATIENT)
Dept: CT IMAGING | Facility: OTHER | Age: 63
Discharge: HOME | End: 2023-09-01
Attending: NURSE PRACTITIONER
Payer: COMMERCIAL

## 2023-09-01 ENCOUNTER — HOSPITAL ENCOUNTER (OUTPATIENT)
Dept: CARDIOLOGY | Facility: OTHER | Age: 63
Discharge: HOME | End: 2023-09-01
Attending: NURSE PRACTITIONER
Payer: COMMERCIAL

## 2023-09-01 ENCOUNTER — ANESTHESIA (OUTPATIENT)
Dept: ANESTHESIOLOGY | Facility: OTHER | Age: 63
End: 2023-09-01
Payer: COMMERCIAL

## 2023-09-01 ENCOUNTER — ANESTHESIA EVENT (OUTPATIENT)
Dept: ANESTHESIOLOGY | Facility: OTHER | Age: 63
End: 2023-09-01
Payer: COMMERCIAL

## 2023-09-01 DIAGNOSIS — I51.7 CARDIOMEGALY: ICD-10-CM

## 2023-09-01 DIAGNOSIS — R06.09 DOE (DYSPNEA ON EXERTION): ICD-10-CM

## 2023-09-01 DIAGNOSIS — Q96.3 MOSAIC TURNER SYNDROME: ICD-10-CM

## 2023-09-01 LAB
BI-PLANE LVEF ECHO: NORMAL
LVEF ECHO: NORMAL

## 2023-09-01 PROCEDURE — 76377 3D RENDER W/INTRP POSTPROCES: CPT

## 2023-09-01 PROCEDURE — 93306 TTE W/DOPPLER COMPLETE: CPT | Mod: 26 | Performed by: STUDENT IN AN ORGANIZED HEALTH CARE EDUCATION/TRAINING PROGRAM

## 2023-09-01 PROCEDURE — 36410 VNPNXR 3YR/> PHY/QHP DX/THER: CPT | Performed by: NURSE ANESTHETIST, CERTIFIED REGISTERED

## 2023-09-01 PROCEDURE — 255N000002 HC RX 255 OP 636: Performed by: NURSE PRACTITIONER

## 2023-09-01 PROCEDURE — 250N000011 HC RX IP 250 OP 636: Performed by: NURSE PRACTITIONER

## 2023-09-01 PROCEDURE — 999N000208 ECHOCARDIOGRAM COMPLETE

## 2023-09-01 RX ORDER — IOPAMIDOL 755 MG/ML
100 INJECTION, SOLUTION INTRAVASCULAR ONCE
Status: COMPLETED | OUTPATIENT
Start: 2023-09-01 | End: 2023-09-01

## 2023-09-01 RX ADMIN — IOPAMIDOL 100 ML: 755 INJECTION, SOLUTION INTRAVENOUS at 15:39

## 2023-09-01 RX ADMIN — PERFLUTREN 2 ML: 6.52 INJECTION, SUSPENSION INTRAVENOUS at 15:45

## 2023-09-11 ENCOUNTER — TELEPHONE (OUTPATIENT)
Dept: CARDIOLOGY | Facility: OTHER | Age: 63
End: 2023-09-11
Payer: COMMERCIAL

## 2023-09-11 NOTE — TELEPHONE ENCOUNTER
Patient verified .  Reminder call for stress test with instructions given.  Emphasis on NO caffeine for 12 hours.   Patient verbalized understanding.

## 2023-09-12 ENCOUNTER — TELEPHONE (OUTPATIENT)
Dept: CARDIOLOGY | Facility: OTHER | Age: 63
End: 2023-09-12

## 2023-09-12 NOTE — TELEPHONE ENCOUNTER
Pt verified her  had questions re:  Lexiscan stress test.  Test explained in full to patient who verbalized understanding.

## 2023-09-13 ENCOUNTER — HOSPITAL ENCOUNTER (OUTPATIENT)
Dept: NUCLEAR MEDICINE | Facility: OTHER | Age: 63
Discharge: HOME | End: 2023-09-13
Attending: NURSE PRACTITIONER
Payer: COMMERCIAL

## 2023-09-13 DIAGNOSIS — E78.5 HYPERLIPIDEMIA LDL GOAL <70: ICD-10-CM

## 2023-09-13 DIAGNOSIS — Z82.49 FAMILY HISTORY OF ISCHEMIC HEART DISEASE: ICD-10-CM

## 2023-09-13 DIAGNOSIS — R06.09 DOE (DYSPNEA ON EXERTION): ICD-10-CM

## 2023-09-13 DIAGNOSIS — I51.7 CARDIOMEGALY: ICD-10-CM

## 2023-09-13 LAB
CV BLOOD PRESSURE: 74 MMHG
CV STRESS MAX HR HE: 103
NUC STRESS EJECTION FRACTION: 65 %
RATE PRESSURE PRODUCT: NORMAL
STRESS ECHO BASELINE DIASTOLIC HE: 84
STRESS ECHO BASELINE HR: 65 BPM
STRESS ECHO BASELINE SYSTOLIC BP: 136
STRESS ECHO CALCULATED PERCENT HR: 66 %
STRESS ECHO LAST STRESS DIASTOLIC BP: 89
STRESS ECHO LAST STRESS SYSTOLIC BP: 169
STRESS ECHO POST ESTIMATED WORKLOAD: 1 METS
STRESS ECHO TARGET HR: 155

## 2023-09-13 PROCEDURE — A9500 TC99M SESTAMIBI: HCPCS | Performed by: NURSE PRACTITIONER

## 2023-09-13 PROCEDURE — 250N000011 HC RX IP 250 OP 636: Mod: JZ | Performed by: NURSE PRACTITIONER

## 2023-09-13 PROCEDURE — 343N000001 HC RX 343: Performed by: NURSE PRACTITIONER

## 2023-09-13 PROCEDURE — 93017 CV STRESS TEST TRACING ONLY: CPT

## 2023-09-13 PROCEDURE — 93016 CV STRESS TEST SUPVJ ONLY: CPT | Performed by: STUDENT IN AN ORGANIZED HEALTH CARE EDUCATION/TRAINING PROGRAM

## 2023-09-13 PROCEDURE — 93018 CV STRESS TEST I&R ONLY: CPT | Performed by: STUDENT IN AN ORGANIZED HEALTH CARE EDUCATION/TRAINING PROGRAM

## 2023-09-13 PROCEDURE — 78452 HT MUSCLE IMAGE SPECT MULT: CPT

## 2023-09-13 RX ORDER — REGADENOSON 0.08 MG/ML
0.4 INJECTION, SOLUTION INTRAVENOUS ONCE
Status: COMPLETED | OUTPATIENT
Start: 2023-09-13 | End: 2023-09-13

## 2023-09-13 RX ORDER — AMINOPHYLLINE 25 MG/ML
50 INJECTION, SOLUTION INTRAVENOUS 2 TIMES DAILY PRN
Status: DISCONTINUED | OUTPATIENT
Start: 2023-09-13 | End: 2023-09-14 | Stop reason: HOSPADM

## 2023-09-13 RX ADMIN — REGADENOSON 0.4 MG: 0.08 INJECTION, SOLUTION INTRAVENOUS at 08:59

## 2023-09-13 RX ADMIN — KIT FOR THE PREPARATION OF TECHNETIUM TC99M SESTAMIBI 31.2 MILLICURIE: 1 INJECTION, POWDER, LYOPHILIZED, FOR SOLUTION PARENTERAL at 09:00

## 2023-09-13 RX ADMIN — KIT FOR THE PREPARATION OF TECHNETIUM TC99M SESTAMIBI 8.13 MILLICURIE: 1 INJECTION, POWDER, LYOPHILIZED, FOR SOLUTION PARENTERAL at 07:05

## 2023-09-13 NOTE — PROGRESS NOTES
06:50 The patient arrived for a Lexiscan Cardiolite stress test.  The procedure, risks, and benefits were discussed with the patient ,and the consent was signed.  A saline lock was started,and the Cardiolite was injected by Nuclear Medicine.  The patient was taken to the waiting area, to await resting images at 0725.  0830: The patient returned from Nuclear Medicine and was prepped for the stress test.   Alyssa Sandoval NP arrived, and the patient was administered the Lexiscan per procedure.  The patient tolerated the procedure.  She was given a snack and taken to Nuclear Medicine in stable condition for stress images.  The saline lock will be removed by Nuclear Medicine for proper disposal.  The patient was instructed that the ordering MD will call with results in one to two days.  Please see the chart for the complete test results.

## 2023-09-14 DIAGNOSIS — J45.30 MILD PERSISTENT ASTHMA WITHOUT COMPLICATION: ICD-10-CM

## 2023-09-14 RX ORDER — MONTELUKAST SODIUM 10 MG/1
1 TABLET ORAL DAILY
Qty: 90 TABLET | Refills: 0 | Status: CANCELLED | OUTPATIENT
Start: 2023-09-14

## 2023-09-15 ENCOUNTER — ANESTHESIA EVENT (OUTPATIENT)
Dept: SURGERY | Facility: OTHER | Age: 63
End: 2023-09-15
Payer: COMMERCIAL

## 2023-09-15 ENCOUNTER — ANESTHESIA (OUTPATIENT)
Dept: SURGERY | Facility: OTHER | Age: 63
End: 2023-09-15
Payer: COMMERCIAL

## 2023-09-15 ENCOUNTER — HOSPITAL ENCOUNTER (OUTPATIENT)
Facility: OTHER | Age: 63
Discharge: HOME | End: 2023-09-15
Attending: SURGERY | Admitting: SURGERY
Payer: COMMERCIAL

## 2023-09-15 VITALS
WEIGHT: 165 LBS | HEART RATE: 76 BPM | TEMPERATURE: 97.4 F | DIASTOLIC BLOOD PRESSURE: 97 MMHG | OXYGEN SATURATION: 97 % | BODY MASS INDEX: 31.15 KG/M2 | RESPIRATION RATE: 18 BRPM | HEIGHT: 61 IN | SYSTOLIC BLOOD PRESSURE: 142 MMHG

## 2023-09-15 DIAGNOSIS — K29.70 GASTRITIS DETERMINED BY ENDOSCOPY: Primary | ICD-10-CM

## 2023-09-15 PROCEDURE — 258N000003 HC RX IP 258 OP 636: Performed by: SURGERY

## 2023-09-15 PROCEDURE — 43239 EGD BIOPSY SINGLE/MULTIPLE: CPT

## 2023-09-15 PROCEDURE — 88305 TISSUE EXAM BY PATHOLOGIST: CPT

## 2023-09-15 PROCEDURE — 43239 EGD BIOPSY SINGLE/MULTIPLE: CPT | Performed by: SURGERY

## 2023-09-15 PROCEDURE — 250N000011 HC RX IP 250 OP 636

## 2023-09-15 PROCEDURE — 250N000009 HC RX 250

## 2023-09-15 PROCEDURE — 43235 EGD DIAGNOSTIC BRUSH WASH: CPT | Performed by: SURGERY

## 2023-09-15 PROCEDURE — 999N000010 HC STATISTIC ANES STAT CODE-CRNA PER MINUTE: Performed by: SURGERY

## 2023-09-15 RX ORDER — LIDOCAINE HYDROCHLORIDE 20 MG/ML
INJECTION, SOLUTION INFILTRATION; PERINEURAL PRN
Status: DISCONTINUED | OUTPATIENT
Start: 2023-09-15 | End: 2023-09-15

## 2023-09-15 RX ORDER — NALOXONE HYDROCHLORIDE 0.4 MG/ML
0.2 INJECTION, SOLUTION INTRAMUSCULAR; INTRAVENOUS; SUBCUTANEOUS
Status: DISCONTINUED | OUTPATIENT
Start: 2023-09-15 | End: 2023-09-15 | Stop reason: HOSPADM

## 2023-09-15 RX ORDER — PROPOFOL 10 MG/ML
INJECTION, EMULSION INTRAVENOUS CONTINUOUS PRN
Status: DISCONTINUED | OUTPATIENT
Start: 2023-09-15 | End: 2023-09-15

## 2023-09-15 RX ORDER — FLUMAZENIL 0.1 MG/ML
0.2 INJECTION, SOLUTION INTRAVENOUS
Status: DISCONTINUED | OUTPATIENT
Start: 2023-09-15 | End: 2023-09-15 | Stop reason: HOSPADM

## 2023-09-15 RX ORDER — PROPOFOL 10 MG/ML
INJECTION, EMULSION INTRAVENOUS PRN
Status: DISCONTINUED | OUTPATIENT
Start: 2023-09-15 | End: 2023-09-15

## 2023-09-15 RX ORDER — ONDANSETRON 4 MG/1
4 TABLET, ORALLY DISINTEGRATING ORAL EVERY 6 HOURS PRN
Status: DISCONTINUED | OUTPATIENT
Start: 2023-09-15 | End: 2023-09-15 | Stop reason: HOSPADM

## 2023-09-15 RX ORDER — SODIUM CHLORIDE, SODIUM LACTATE, POTASSIUM CHLORIDE, CALCIUM CHLORIDE 600; 310; 30; 20 MG/100ML; MG/100ML; MG/100ML; MG/100ML
INJECTION, SOLUTION INTRAVENOUS CONTINUOUS
Status: DISCONTINUED | OUTPATIENT
Start: 2023-09-15 | End: 2023-09-15 | Stop reason: HOSPADM

## 2023-09-15 RX ORDER — ONDANSETRON 2 MG/ML
4 INJECTION INTRAMUSCULAR; INTRAVENOUS EVERY 6 HOURS PRN
Status: DISCONTINUED | OUTPATIENT
Start: 2023-09-15 | End: 2023-09-15 | Stop reason: HOSPADM

## 2023-09-15 RX ORDER — ONDANSETRON 2 MG/ML
4 INJECTION INTRAMUSCULAR; INTRAVENOUS
Status: DISCONTINUED | OUTPATIENT
Start: 2023-09-15 | End: 2023-09-15 | Stop reason: HOSPADM

## 2023-09-15 RX ORDER — KETAMINE HYDROCHLORIDE 10 MG/ML
INJECTION INTRAMUSCULAR; INTRAVENOUS PRN
Status: DISCONTINUED | OUTPATIENT
Start: 2023-09-15 | End: 2023-09-15

## 2023-09-15 RX ORDER — NALOXONE HYDROCHLORIDE 0.4 MG/ML
0.4 INJECTION, SOLUTION INTRAMUSCULAR; INTRAVENOUS; SUBCUTANEOUS
Status: DISCONTINUED | OUTPATIENT
Start: 2023-09-15 | End: 2023-09-15 | Stop reason: HOSPADM

## 2023-09-15 RX ORDER — LIDOCAINE 40 MG/G
CREAM TOPICAL
Status: DISCONTINUED | OUTPATIENT
Start: 2023-09-15 | End: 2023-09-15 | Stop reason: HOSPADM

## 2023-09-15 RX ORDER — PROCHLORPERAZINE MALEATE 5 MG
5 TABLET ORAL EVERY 6 HOURS PRN
Status: DISCONTINUED | OUTPATIENT
Start: 2023-09-15 | End: 2023-09-15 | Stop reason: HOSPADM

## 2023-09-15 RX ADMIN — LIDOCAINE HYDROCHLORIDE 80 MG: 20 INJECTION, SOLUTION INFILTRATION; PERINEURAL at 13:53

## 2023-09-15 RX ADMIN — PROPOFOL 50 MG: 10 INJECTION, EMULSION INTRAVENOUS at 13:56

## 2023-09-15 RX ADMIN — PROPOFOL 180 MCG/KG/MIN: 10 INJECTION, EMULSION INTRAVENOUS at 13:56

## 2023-09-15 RX ADMIN — SODIUM CHLORIDE, POTASSIUM CHLORIDE, SODIUM LACTATE AND CALCIUM CHLORIDE 30 ML/HR: 600; 310; 30; 20 INJECTION, SOLUTION INTRAVENOUS at 12:17

## 2023-09-15 RX ADMIN — Medication 30 MG: at 14:01

## 2023-09-15 ASSESSMENT — ACTIVITIES OF DAILY LIVING (ADL)
ADLS_ACUITY_SCORE: 35
ADLS_ACUITY_SCORE: 35

## 2023-09-15 NOTE — OR NURSING
Nikki has been discharged to home at 1510 via ambulatory accompanied by spouse, Pablo    Written discharge instructions were provided to patient.  Prescriptions were none.      Patient and adult caring for them verbalize understanding of discharge instructions including no driving until tomorrow and no longer taking narcotic pain medications - no operating mechanical equipment and no making any important decisions.They understand reason for discharge, and necessary follow-up appointments.

## 2023-09-15 NOTE — H&P
CHIEF COMPLAINT / REASON FOR PROCEDURE:  reflux    PERTINENT HISTORY   Patient complains of reflux. Previous EGD none.     Past Medical History:   Diagnosis Date    Allergic rhinitis 01/12/2007    Amblyopia of eye     left eye; patching and vision therapy.    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 See HPI.    ALLERGIES/SENSITIVITIES:   Allergies   Allergen Reactions    Cats Other (See Comments)     Other reaction(s): Runny Nose    Dust Mites Other (See Comments) and Itching     Sneezing, watery eyes    Ragweeds Other (See Comments)     Runny Nose; Sneezing, watery eyes        CURRENT MEDICATIONS:    Current Facility-Administered Medications   Medication    lactated ringers infusion    lidocaine (LMX4) cream    lidocaine 1 % 0.1-1 mL    ondansetron (ZOFRAN) injection 4 mg    sodium chloride (PF) 0.9% PF flush 3 mL    sodium chloride (PF)  "0.9% PF flush 3 mL     No current facility-administered medications on file prior to encounter.  albuterol (PROAIR HFA/PROVENTIL HFA/VENTOLIN HFA) 108 (90 Base) MCG/ACT inhaler, Inhale 2 puffs into the lungs every 4 hours as needed for shortness of breath / dyspnea  ALPRAZolam (XANAX) 0.5 MG tablet, Take 1 tablet (0.5 mg) by mouth 2 times daily as needed for anxiety (for flying) For flying  cetirizine (ZYRTEC) 10 MG tablet, Take 10 mg by mouth daily Alternates between Zyrtec, Claritin and Allegra  co-enzyme Q-10 100 MG CAPS capsule, Take 100 mg by mouth daily  fexofenadine (ALLEGRA) 180 MG tablet, Take 180 mg by mouth daily  FLUoxetine (PROZAC) 10 MG capsule, Take 1 capsule (10 mg) by mouth daily  latanoprost (XALATAN) 0.005 % ophthalmic solution,   metoprolol succinate ER (TOPROL XL) 25 MG 24 hr tablet, TAKE 1 TABLET BY MOUTH EVERY DAY  Misc Natural Products (GLUCOSAMINE CHOND COMPLEX/MSM PO), Take 1 tablet by mouth daily  montelukast (SINGULAIR) 10 MG tablet, TAKE 1 TABLET BY MOUTH EVERY DAY  Multiple Minerals-Vitamins (CALCIUM-MAGNESIUM-ZINC-D3) TABS, Take 1 tablet by mouth daily   probiotic CAPS, Take 1 capsule by mouth daily  RA KRILL  MG CAPS, Take 1 tablet by mouth daily  Spacer/Aero-Holding Chambers (AEROCHAMBER MINI CHAMBER) HORACE, For home use.          PRE-SEDATION ASSESSMENT:   /81   Pulse 68   Temp 96.8  F (36  C) (Tympanic)   Ht 1.549 m (5' 1\")   Wt 74.8 kg (165 lb)   LMP  (LMP Unknown)   SpO2 98%   BMI 31.18 kg/m    Lung Exam:Normal  Heart Exam:  Normal    Comment(s):      IMPRESSION:  reflux.    PLAN:  I discussed diagnostic EGD with the patient.       "

## 2023-09-15 NOTE — ANESTHESIA PREPROCEDURE EVALUATION
Anesthesia Pre-Procedure Evaluation    Patient: Nikki Lezama   MRN: [id number] : 1960        Procedure : Procedure(s):  Esophagoscopy, gastroscopy, duodenoscopy (EGD), combined          Past Medical History:   Diagnosis Date    Allergic rhinitis 2007    Amblyopia of eye     left eye; patching and vision therapy.    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      Past Surgical History:   Procedure Laterality Date    BIOPSY BREAST      ,,benign    COLONOSCOPY  2010    hemorrhoids o/w nl to TI;rep 10yrs    COLONOSCOPY N/A 2021    serrated adenomas - f/u 22    COLONOSCOPY N/A 08/10/2022    normal, f/u 5 years due to history    MAMMOPLASTY AUGMENTATION      OOPHORECTOMY      streak ovaries    OTHER SURGICAL HISTORY      LIPOSUCTION    TONSILLECTOMY        Allergies   Allergen Reactions    Cats Other (See Comments)     Other reaction(s): Runny Nose    Dust Mites Other (See Comments) and Itching     Sneezing, watery eyes    Ragweeds Other (See Comments)     Runny Nose; Sneezing, watery eyes      Social History     Tobacco Use    Smoking status: Never    Smokeless tobacco: Never   Substance Use Topics    Alcohol use: Yes     Alcohol/week: 0.0 standard drinks of alcohol     Comment: 3 per week      Wt Readings from Last 1 Encounters:   09/15/23 74.8 kg (165 lb)        Anesthesia Evaluation   Pt has had prior anesthetic.         ROS/MED HX  ENT/Pulmonary:     (+)                     Intermittent, asthma  Treatment: Inhaler prn,                 Neurologic: Comment: Mosaic Lino Syndrome      Cardiovascular:     (+)  - -   -  - -                                 Previous cardiac testing   Echo: Date: 23 Results:  Impression  No impression found.    Narrative  [id number]  [id number]  [id number]  [id number]^EDUARD^AMAURI^HOLLIE     [facility]  [address] [place] [address]     Name: NOLA HUITRON  MRN: [id number]  : 1960  Study Date: 2023 02:25 PM  Age: 63 yrs  Gender: Female  Patient Location: [facility]  Reason For Study: ZARATE (dyspnea on exertion), Cardiomegaly  Ordering Physician: AMAURI CHAPA  Referring Physician: AMAURI CHAPA  Performed By: Belem Houston, TERRI, RDCS, RVT     BSA: 1.8 m2  Height: 61 in  Weight: 172 lb  HR: 80  BP: 134/82 mmHg  ______________________________________________________________________________  Procedure  Echocardiogram with two-dimensional, color and spectral Doppler performed.  Contrast Definity. Definity (NDC #95970-486-98) given intravenously. Patient  was given 2ml mixture of 1.5ml Definity and 8.5ml saline. 8 ml wasted.  Definity Lot # [id number] .  ______________________________________________________________________________  Interpretation Summary  Global and regional left ventricular function is normal with an EF of 60-65%.  Biplane LVEF is 64%.  Global right ventricular function is normal.  IVC diameter <2.1 cm collapsing >50% with sniff suggests a normal RA pressure  of 3 mmHg.  No pericardial effusion is present.  ______________________________________________________________________________  Left Ventricle  Global and regional left ventricular function is normal with an EF of 60-65%.  Biplane LVEF is 64%. Left ventricular wall thickness is normal. Left  ventricular size is normal. The Ejection Fraction was calculated using Bi-  plane contrast. No regional wall motion abnormalities are seen.     Right  Ventricle  Global right ventricular function is normal. The right ventricle is normal  size.     Atria  Both atria appear normal.     Mitral Valve  Mild mitral annular calcification is present.     Aortic Valve  The valve leaflets are not well visualized. On Doppler interrogation, there is  no significant stenosis or regurgitation.     Tricuspid Valve  Trace tricuspid insufficiency is present. Right ventricular systolic pressure  is 22mmHg above the right atrial pressure. Pulmonary artery systolic pressure  is normal.     Pulmonic Valve  The pulmonic valve is normal. Trace pulmonic insufficiency is present.     Vessels  The thoracic aorta is normal. IVC diameter <2.1 cm collapsing >50% with sniff  suggests a normal RA pressure of 3 mmHg.     Pericardium  No pericardial effusion is present.     Compared to Previous Study  There has been no change.     ______________________________________________________________________________  MMode/2D Measurements & Calculations  IVSd: 0.80 cm  LVIDd: 4.1 cm  LVIDs: 3.0 cm  LVPWd: 0.81 cm  FS: 26.6 %  LV mass(C)d: 96.8 grams  LV mass(C)dI: 54.6 grams/m2     Ao root diam: 3.1 cm  asc Aorta Diam: 2.8 cm  LVOT diam: 2.2 cm  LVOT area: 4.0 cm2  Ao root diam Index (cm/m2): 1.7  asc Aorta Diam Index (cm/m2): 1.6  LA Volume (BP): 31.3 ml  LA Volume Index (BP): 17.7 ml/m2  RWT: 0.39  TAPSE: 1.9 cm     Doppler Measurements & Calculations  MV E max jason: 90.7 cm/sec  MV A max jason: 113.0 cm/sec  MV E/A: 0.80  MV dec time: 0.22 sec  Ao V2 max: 136.0 cm/sec  Ao max P.0 mmHg  Ao V2 mean: 90.9 cm/sec  Ao mean P.0 mmHg  Ao V2 VTI: 26.1 cm  ASIA(I,D): 3.4 cm2  ASIA(V,D): 3.2 cm2  LV V1 max P.8 mmHg  LV V1 max: 110.0 cm/sec  LV V1 VTI: 22.2 cm  SV(LVOT): 87.7 ml  SI(LVOT): 49.5 ml/m2  PA acc time: 0.09 sec  TR max jason: 184.6 cm/sec  TR max P.5 mmHg  AV Jason Ratio (DI): 0.81  ASIA Index (cm2/m2): 1.9  E/E' av.5  Lateral E/e': 13.7  Medial E/e': 13.2  RV S Jason: 15.4 cm/sec      ______________________________________________________________________________  Report approved by: Declan Prado 09/01/2023 03:52 PM      Stress Test:  Date: Results:    ECG Reviewed:  Date: 8/3/23 Results:  Unusual P axis, possible ectopic atrial bradycardia   Left axis deviation   Nonspecific T wave abnormality   When compared with ECG of 10-JUL-2023 16:59,   Ectopic atrial rhythm has replaced Sinus rhythm   Nonspecific T wave abnormality has replaced inverted T waves in Anterior leads   QT has shortened   Confirmed by MD BRIONES DANIEL ([id number]) on 8/6/2023 7:53:13 PM     Cath:  Date: Results:      METS/Exercise Tolerance: >4 METS    Hematologic:  - neg hematologic  ROS     Musculoskeletal:  - neg musculoskeletal ROS     GI/Hepatic:     (+) GERD, Asymptomatic on medication,           liver disease,       Renal/Genitourinary:       Endo:     (+)               Obesity,       Psychiatric/Substance Use:     (+) psychiatric history anxiety       Infectious Disease:       Malignancy:  - neg malignancy ROS     Other:  - neg other ROS          Physical Exam    Airway        Mallampati: I   TM distance: > 3 FB   Neck ROM: full   Mouth opening: > 3 cm    Respiratory Devices and Support         Dental       (+) Minor Abnormalities - some fillings, tiny chips      Cardiovascular   cardiovascular exam normal       Rhythm and rate: regular and normal     Pulmonary   pulmonary exam normal        breath sounds clear to auscultation           OUTSIDE LABS:  CBC:   Lab Results   Component Value Date    WBC 11.5 (H) 07/10/2023    WBC 6.9 05/25/2023    HGB 13.7 07/10/2023    HGB 14.7 05/25/2023    HCT 39.6 07/10/2023    HCT 43.4 05/25/2023     07/10/2023     05/25/2023     BMP:   Lab Results   Component Value Date     07/10/2023     05/25/2023    POTASSIUM 3.5 07/10/2023    POTASSIUM 4.4 05/25/2023    CHLORIDE 104 07/10/2023    CHLORIDE 103 05/25/2023    CO2 21 (L) 07/10/2023    CO2 26  05/25/2023    BUN 16.3 07/10/2023    BUN 17.0 05/25/2023    CR 0.64 07/10/2023    CR 0.66 05/25/2023     (H) 07/10/2023     (H) 05/25/2023     COAGS: No results found for: PTT, INR, FIBR  POC: No results found for: BGM, HCG, HCGS  HEPATIC:   Lab Results   Component Value Date    ALBUMIN 4.5 07/10/2023    PROTTOTAL 6.8 07/10/2023    ALT 40 07/10/2023    AST 34 07/10/2023    ALKPHOS 48 07/10/2023    BILITOTAL 0.4 07/10/2023     OTHER:   Lab Results   Component Value Date    LACT 1.4 04/14/2021    A1C 6.0 05/25/2023    MALDONADO 8.9 07/10/2023    MAG 1.8 (L) 05/12/2022    LIPASE 29 09/12/2018    AMYLASE 18 (L) 09/12/2018    TSH 1.08 05/25/2023    T4 0.76 05/12/2022    CRP 0.2 09/12/2018    SED 8 06/21/2019       Anesthesia Plan    ASA Status:  2    NPO Status:  NPO Appropriate    Anesthesia Type: MAC.     - Reason for MAC: straight local not clinically adequate   Induction: Intravenous.           Consents    Anesthesia Plan(s) and associated risks, benefits, and realistic alternatives discussed. Questions answered and patient/representative(s) expressed understanding.     - Discussed: Risks, Benefits and Alternatives for BOTH SEDATION and the PROCEDURE were discussed     - Discussed with:  Patient            Postoperative Care            Comments:                MAKSIM QUEVEDO CRNA

## 2023-09-15 NOTE — DISCHARGE INSTRUCTIONS
Procedure you had done: EGD with biopsies  Your health care provider is:  Noris Medellin  Your surgeon is Dr. Herlinda Wilde.   Please call your health care provider or surgeon at [phone] if:    - you feel you are getting worse or having an increase in problems    - fever greater than 101 degrees  - increasing shortness of breath or chest pain  - any signs of infection (increasing redness, swelling, tenderness, warmth, change in appearance, or  increased drainage)  - blood in your urine or stool  - coughing or vomiting blood  - nausea (upset stomach) and vomiting and/or diarrhea that will not stop  - severe pain that is not relieved by medicine, rest or ice  You have had medications for sedation. Please be aware that this can cause drowsiness and impaired judgment for up to 24 hours after your procedure. Do not drive, operate power tools or drink alcohol for 24 hours.  If samples were taken-you will get a phone call and a letter with your results in the next 7-10 days. If you don't get results, please call and let us know!   .[facility]  Adult Discharge Orders & Instructions      For 24 hours after surgery:  Get plenty of rest.  A responsible adult must stay with you for at least 24 hours after you leave the hospital.   You may feel lightheaded.  IF so, sit for a few minutes before standing.  Have someone help you get up.   You may have a slight fever. Call the doctor if your fever is over 101 F (38.3 C) (taken under the tongue) or lasts longer than 24 hours.  You may have a dry mouth, a sore throat, muscle aches or trouble sleeping.  These should go away after 24 hours.  Do not make important or legal decisions.  6.   Do not drive or use heavy equipment.  If you have weakness or tingling, don't drive or use heavy equipment until this feeling goes away.                                                                                                                                                                          To contact a doctor, call    [phone]______________

## 2023-09-15 NOTE — ANESTHESIA POSTPROCEDURE EVALUATION
Patient: Nikki Lezama    Procedure: Procedure(s):  Esophagoscopy, gastroscopy, duodenoscopy (EGD), combined WITH BIOPSIES       Anesthesia Type:  MAC    Note:  Disposition: Outpatient   Postop Pain Control: Uneventful            Sign Out: Well controlled pain   PONV: No   Neuro/Psych: Uneventful            Sign Out: Acceptable/Baseline neuro status   Airway/Respiratory: Uneventful            Sign Out: Acceptable/Baseline resp. status   CV/Hemodynamics: Uneventful            Sign Out: Acceptable CV status; No obvious hypovolemia; No obvious fluid overload   Other NRE: NONE   DID A NON-ROUTINE EVENT OCCUR? No           Last vitals:  Vitals Value Taken Time   /97 09/15/23 1445   Temp 97.4  F (36.3  C) 09/15/23 1445   Pulse 76 09/15/23 1445   Resp 18 09/15/23 1445   SpO2 97 % 09/15/23 1445       Electronically Signed By: MAKSIM QUEVEDO CRNA  September 15, 2023  4:21 PM

## 2023-09-15 NOTE — OP NOTE
PROCEDURE NOTE    SURGEON: Herlinda Wilde MD.    PRE-OP DIAGNOSIS:   reflux      POST-OP DIAGNOSIS: gastritis    PROCEDURE PLANNED:   Diagnostic EGD, flexible        PROCEDURE PERFORMED:  EGD with biopsy, flexible    SPECIMEN:  duodenum, antrum, GEJ    ANESTHESIA: See anesthesia record    ESTIMATED BLOOD LOSS: None    COMPLICATIONS:  None    INDICATION FORTHE PROCEDURE: The patient is a 63 year oldfemale. The patient presents with reflux. I explained to the patient the risks, benefits and alternatives to diagnostic EGD for evaluating her complaints. We specifically discussed the risks of bleeding, infection, perforation and the risks of sedation. The patient's questions were answered and the patient wished to proceed. Informed consent paperwork was completed.    PROCEDURE: The patient was taken to the endoscopy suite. Appropriate monitors were attached. The patient was placed in the left lateral decubitus position. Bite block was positioned.Timeout was performed confirming the patient's identity and procedure to be performed. After appropriate sedation was confirmed, the flexible endoscope was advanced into the oropharynx. The posterior oropharynx appeared grossly normal. The scope was advanced into the proximal esophagus. The esophagus was insufflated with air. The scope was advanced under direct visualization. No acute abnormalities of the esophagus were noted. The scope was advanced into the stomach. Linear gastritis was noted. The scope was advanced through the pylorus into the duodenal bulb. The bulb and distal duodenum appeared grossly normal. Biopsies were taken. The scope was withdrawn back into the stomach. Antral biopsy was obtained and sent to pathology. The scope was retroflexed and the GE junction inspected. No abnormalities were noted. The scope was returned to a neutral position and the stomach was decompressed. The scope was withdrawn to the GE junction and biopsy obtained. The mucosa of the  esophagus was inspected while withdrawing the scope. No abnormalities were noted. The scope was withdrawn from the patient. The bite block was removed. The patient tolerated the procedure with no immediately apparent complication. The patient was taken to recovery in stable condition.    FOLLOW UP:  RECOMMENDATIONS Will call with pathology results.

## 2023-09-15 NOTE — ANESTHESIA CARE TRANSFER NOTE
Patient: Nikki Lezama    Procedure: Procedure(s):  Esophagoscopy, gastroscopy, duodenoscopy (EGD), combined WITH BIOPSIES       Diagnosis: Gastroesophageal reflux disease without esophagitis [K21.9]  Diagnosis Additional Information: No value filed.    Anesthesia Type:   MAC     Note:    Oropharynx: oropharynx clear of all foreign objects and spontaneously breathing  Level of Consciousness: drowsy and awake  Oxygen Supplementation: room air    Independent Airway: airway patency satisfactory and stable  Dentition: dentition unchanged  Vital Signs Stable: post-procedure vital signs reviewed and stable  Report to RN Given: handoff report given  Patient transferred to: Phase II    Handoff Report: Identifed the Patient, Identified the Reponsible Provider, Reviewed the pertinent medical history, Discussed the surgical course, Reviewed Intra-OP anesthesia mangement and issues during anesthesia, Set expectations for post-procedure period and Allowed opportunity for questions and acknowledgement of understanding      Vitals:  Vitals Value Taken Time   BP     Temp     Pulse     Resp     SpO2 95 % 09/15/23 1417   Vitals shown include unvalidated device data.    Electronically Signed By: MAKSIM Rivera CRNA  September 15, 2023  2:18 PM

## 2023-09-18 LAB
PATH REPORT.COMMENTS IMP SPEC: NORMAL
PATH REPORT.FINAL DX SPEC: NORMAL
PATH REPORT.RELEVANT HX SPEC: NORMAL
PHOTO IMAGE: NORMAL

## 2023-09-19 DIAGNOSIS — R94.39 ABNORMAL STRESS TEST: Primary | ICD-10-CM

## 2023-09-19 DIAGNOSIS — K21.00 GASTROESOPHAGEAL REFLUX DISEASE WITH ESOPHAGITIS WITHOUT HEMORRHAGE: Primary | ICD-10-CM

## 2023-09-19 DIAGNOSIS — R06.09 DOE (DYSPNEA ON EXERTION): ICD-10-CM

## 2023-09-19 DIAGNOSIS — Z82.49 FAMILY HISTORY OF ISCHEMIC HEART DISEASE: ICD-10-CM

## 2023-09-19 RX ORDER — SUCRALFATE 1 G/1
1 TABLET ORAL 3 TIMES DAILY
Qty: 120 TABLET | Refills: 1 | Status: SHIPPED | OUTPATIENT
Start: 2023-09-19 | End: 2023-10-15

## 2023-09-20 DIAGNOSIS — J45.30 MILD PERSISTENT ASTHMA WITHOUT COMPLICATION: ICD-10-CM

## 2023-09-20 RX ORDER — MONTELUKAST SODIUM 10 MG/1
1 TABLET ORAL DAILY
Qty: 90 TABLET | Refills: 1 | Status: SHIPPED | OUTPATIENT
Start: 2023-09-20 | End: 2024-03-06

## 2023-09-20 NOTE — TELEPHONE ENCOUNTER
No medication or note attached to encounter. Arlyn Campos RN  ....................  9/20/2023   5:32 PM

## 2023-09-20 NOTE — TELEPHONE ENCOUNTER
Routing refill request to provider for review/approval because:    LOV: 5/25/23  Dr. Medellin out of office will route to covering provider for review and consideration.    Nahomy Gonzalez RN on 9/20/2023 at 4:43 PM

## 2023-09-20 NOTE — TELEPHONE ENCOUNTER
Reason for call: Medication or medication refill    Name of medication requested: montelukast    How many days of medication do you have left? THREE    What [facility] use[facility]    Preferred method for responding to this message: Telephone Call    Phone number patient can be reached at: Cell number on file:    Telephone Information:   Mobile [phone]       If we cannot reach you directly, may we leave a detailed response at the number you provided? Yes        Patient stated the pharmacy has yet to hear back and asked her to call NICOLAS Colindres on 9/20/2023 at 3:34 PM

## 2023-10-12 DIAGNOSIS — K21.00 GASTROESOPHAGEAL REFLUX DISEASE WITH ESOPHAGITIS WITHOUT HEMORRHAGE: ICD-10-CM

## 2023-10-13 NOTE — TELEPHONE ENCOUNTER
Disp Refills Start End CARLOS   sucralfate (CARAFATE) 1 GM tablet 120 tablet 1 9/19/2023  No   Sig - Route: Take 1 tablet (1 g) by mouth 3 times daily - Oral     Requesting 90 day supply. Arlyn Campos RN  ....................  10/13/2023   9:48 AM

## 2023-10-15 RX ORDER — SUCRALFATE 1 G/1
1 TABLET ORAL 3 TIMES DAILY
Qty: 270 TABLET | Refills: 3 | Status: SHIPPED | OUTPATIENT
Start: 2023-10-15 | End: 2024-06-06

## 2023-10-20 DIAGNOSIS — Z86.79 HISTORY OF SUPRAVENTRICULAR TACHYCARDIA: ICD-10-CM

## 2023-10-24 RX ORDER — METOPROLOL SUCCINATE 25 MG/1
25 TABLET, EXTENDED RELEASE ORAL DAILY
Qty: 90 TABLET | Refills: 0 | Status: SHIPPED | OUTPATIENT
Start: 2023-10-24 | End: 2024-01-26

## 2023-10-24 NOTE — TELEPHONE ENCOUNTER
[id number] [place] sent Rx request for the following:      Requested Prescriptions   Pending Prescriptions Disp Refills    metoprolol succinate ER (TOPROL XL) 25 MG 24 hr tablet 90 tablet 1     Sig: Take 1 tablet (25 mg) by mouth daily       Beta-Blockers Protocol Failed - 10/24/2023  4:22 PM        Failed - Blood pressure under 140/90 in past 12 months     BP Readings from Last 3 Encounters:   09/15/23 (!) 142/97   08/03/23 134/82   07/10/23 118/77        Last Prescription Date:   4/20/23  Last Fill Qty/Refills:         90, R-1    Last Office Visit:              5/25/23 (Yoseph)   Future Office visit:           None    Pt due for appointment to establish care with new provider, in the absence of Maren Hameed. Routing to provider for refill consideration. Routing to Unit scheduling pool, to assist Pt in scheduling appointment.     Unable to complete prescription refill per RN Medication Refill Policy.     Routing to covering provider for refill consideration, as PCP/provider is out of clinic >48 hours or Pt is completely out of medication and provider is out of the clinic today.    Tahira Salmeron RN .............. 10/24/2023  4:25 PM

## 2023-11-22 ENCOUNTER — TRANSFERRED RECORDS (OUTPATIENT)
Dept: HEALTH INFORMATION MANAGEMENT | Facility: OTHER | Age: 63
End: 2023-11-22
Payer: COMMERCIAL

## 2023-12-07 ENCOUNTER — PATIENT OUTREACH (OUTPATIENT)
Dept: GASTROENTEROLOGY | Facility: CLINIC | Age: 63
End: 2023-12-07
Payer: COMMERCIAL

## 2023-12-29 DIAGNOSIS — F41.1 GAD (GENERALIZED ANXIETY DISORDER): ICD-10-CM

## 2023-12-29 DIAGNOSIS — F40.243 FEAR OF FLYING: ICD-10-CM

## 2024-01-02 RX ORDER — ALPRAZOLAM 0.5 MG
0.5 TABLET ORAL 2 TIMES DAILY PRN
Qty: 60 TABLET | Refills: 0 | OUTPATIENT
Start: 2024-01-02

## 2024-01-02 NOTE — TELEPHONE ENCOUNTER
ALPRAZolam (XANAX) 0.5 MG tablet         Sig: Take 1 tablet (0.5 mg) by mouth 2 times daily as needed for anxiety (for flying) For flying         Last Written Prescription Date:  5/25/23  Last Fill Quantity: 60,   # refills: 0  Last Office Visit: 5/25/23  Future Office visit:       Routing refill request to provider for review/approval because:  Drug not on the [facility], P or [facility] refill protocol or controlled substance Nanette Tripathi RN on 1/2/2024 at 12:16 PM

## 2024-01-02 NOTE — TELEPHONE ENCOUNTER
Message to pharmacy with refusal:    Refusal reason: Patient needs appointment     Tahira Salmeron RN .............. 1/2/2024  2:15 PM

## 2024-01-25 DIAGNOSIS — Z86.79 HISTORY OF SUPRAVENTRICULAR TACHYCARDIA: ICD-10-CM

## 2024-01-25 NOTE — TELEPHONE ENCOUNTER
[facility] in #[id number] in [employer] [facility] [place] sent Rx request for the following:      Requested Prescriptions   Pending Prescriptions Disp Refills    metoprolol succinate ER (TOPROL XL) 25 MG 24 hr tablet [Pharmacy Med Name: METOPROLOL SUCC ER 25 MG TAB] 90 tablet 0     Sig: TAKE 1 TABLET BY MOUTH EVERY DAY       Beta-Blockers Protocol Failed - 1/25/2024 11:12 AM        Failed - Blood pressure under 140/90 in past 12 months     BP Readings from Last 3 Encounters:   09/15/23 (!) 142/97   08/03/23 134/82   07/10/23 118/77           Failed - Recent (12 mo) or future (30 days) visit within the authorizing provider's specialty        Failed - Medication indicated for associated diagnosis     Medication is associated with one or more of the following diagnoses:     Hypertension (HTN)   Atrial fibrillation/flutter   Angina   ASCVD   Migraine   Heart Failure   Tremor   Anxiety   Ocular hypertension   Glaucoma     Last Prescription Date:   10/24/23  Last Fill Qty/Refills:         90, R-0    Last Office Visit:              5/25/23 (Yoseph)   Future Office visit:           6/6/24    Unable to complete prescription refill per RN Medication Refill Policy.     Tahira Salmeron RN .............. 1/25/2024  11:28 AM

## 2024-01-26 RX ORDER — METOPROLOL SUCCINATE 25 MG/1
25 TABLET, EXTENDED RELEASE ORAL DAILY
Qty: 90 TABLET | Refills: 0 | Status: SHIPPED | OUTPATIENT
Start: 2024-01-26 | End: 2024-03-14

## 2024-03-06 DIAGNOSIS — J45.30 MILD PERSISTENT ASTHMA WITHOUT COMPLICATION: ICD-10-CM

## 2024-03-07 RX ORDER — MONTELUKAST SODIUM 10 MG/1
1 TABLET ORAL DAILY
Qty: 90 TABLET | Refills: 0 | Status: SHIPPED | OUTPATIENT
Start: 2024-03-07 | End: 2024-05-28

## 2024-03-07 NOTE — TELEPHONE ENCOUNTER
[facility] in #[id number] in [facility] [place] sent Rx request for the following:      Requested Prescriptions   Pending Prescriptions Disp Refills    montelukast (SINGULAIR) 10 MG tablet 90 tablet 1     Sig: Take 1 tablet (10 mg) by mouth daily       Leukotriene Inhibitors Protocol Failed - 3/7/2024  2:22 PM        Failed - Asthma control assessment score within normal limits in last 6 months     Please review ACT score.         Failed - Recent (6 mo) or future (30 days) visit within the authorizing provider's specialty     Last Prescription Date:   9/20/23  Last Fill Qty/Refills:         90, R-1    Last Office Visit:              5/23/23 ([facility])   Future Office visit:           6/6/24    Unable to complete prescription refill per RN Medication Refill Policy.     Tahira Salmeron RN .............. 3/7/2024  2:24 PM

## 2024-03-14 DIAGNOSIS — Z86.79 HISTORY OF SUPRAVENTRICULAR TACHYCARDIA: ICD-10-CM

## 2024-03-14 RX ORDER — METOPROLOL SUCCINATE 25 MG/1
25 TABLET, EXTENDED RELEASE ORAL DAILY
Qty: 90 TABLET | Refills: 0 | Status: SHIPPED | OUTPATIENT
Start: 2024-03-14 | End: 2024-05-28

## 2024-03-14 NOTE — TELEPHONE ENCOUNTER
[facility] in [id number] in [facility] [place] sent Rx request for the following:      Requested Prescriptions   Pending Prescriptions Disp Refills    metoprolol succinate ER (TOPROL XL) 25 MG 24 hr tablet 90 tablet 0     Sig: Take 1 tablet (25 mg) by mouth daily       Beta-Blockers Protocol Failed - 3/14/2024 12:01 PM        Failed - Blood pressure under 140/90 in past 12 months     BP Readings from Last 3 Encounters:   09/15/23 (!) 142/97   08/03/23 134/82   07/10/23 118/77                 Failed - Medication indicated for associated diagnosis     Medication is associated with one or more of the following diagnoses:     Hypertension (HTN)   Atrial fibrillation/flutter   Angina   ASCVD   Migraine   Heart Failure   Tremor   Anxiety   Ocular hypertension   Glaucoma         Last Prescription Date:   1/26/24  Last Fill Qty/Refills:         90, R-0    Last Office Visit:              5/25/23 [facility]   Future Office visit:           6/6/24    Routing refill request to provider for review/approval because:  Drug not on the [facility] refill protocol     Zulay Quiroga RN on 3/14/2024 at 12:03 PM

## 2024-03-18 DIAGNOSIS — Z86.79 HISTORY OF SUPRAVENTRICULAR TACHYCARDIA: ICD-10-CM

## 2024-03-18 RX ORDER — METOPROLOL SUCCINATE 25 MG/1
25 TABLET, EXTENDED RELEASE ORAL DAILY
Qty: 90 TABLET | Refills: 0 | Status: CANCELLED | OUTPATIENT
Start: 2024-03-18

## 2024-03-18 NOTE — TELEPHONE ENCOUNTER
metoprolol succinate ER (TOPROL XL) 25 MG 24 hr tablet 90 tablet 0 3/14/2024 -- No   Sig - Route: Take 1 tablet (25 mg) by mouth daily - Oral   Sent to pharmacy as: Metoprolol Succinate ER 25 MG Oral Tablet Extended Release 24 Hour (TOPROL XL)   Class: E-Prescribe   Order: [id number]   E-Prescribing Status: Receipt confirmed by pharmacy (3/14/2024 12:37 PM CDT)     [facility] [address] [place] [address]     Called and spoke to Patient after verifying last name and date of birth. Pt informed of prescription. She will call [facility] in [place] and have them transfer the prescription. Tahira Salmeron RN .............. 3/18/2024  1:25 PM

## 2024-03-18 NOTE — TELEPHONE ENCOUNTER
Reason for call: Medication or medication refill    Name of medication requested: metoprolol succinate ER    How many days of medication do you have left? TWO WEEKS    What pharmacy do you use?  [facility]  [address] [place] [address]    Telephone: [phone]    Preferred method for responding to this message: Telephone Call    Phone number patient can be reached at: Cell number on file:    Telephone Information:   Mobile [phone]       If we cannot reach you directly, may we leave a detailed response at the number you provided? No      Patient stated the pharmacy has yet to hear back.            Sharlene Colindres on 3/18/2024 at 12:21 PM

## 2024-06-01 SDOH — HEALTH STABILITY: PHYSICAL HEALTH: ON AVERAGE, HOW MANY MINUTES DO YOU ENGAGE IN EXERCISE AT THIS LEVEL?: 30 MIN

## 2024-06-01 SDOH — HEALTH STABILITY: PHYSICAL HEALTH: ON AVERAGE, HOW MANY DAYS PER WEEK DO YOU ENGAGE IN MODERATE TO STRENUOUS EXERCISE (LIKE A BRISK WALK)?: 2 DAYS

## 2024-06-01 ASSESSMENT — PATIENT HEALTH QUESTIONNAIRE - PHQ9: SUM OF ALL RESPONSES TO PHQ QUESTIONS 1-9: 5

## 2024-06-01 ASSESSMENT — ASTHMA QUESTIONNAIRES
QUESTION_2 LAST FOUR WEEKS HOW OFTEN HAVE YOU HAD SHORTNESS OF BREATH: NOT AT ALL
QUESTION_4 LAST FOUR WEEKS HOW OFTEN HAVE YOU USED YOUR RESCUE INHALER OR NEBULIZER MEDICATION (SUCH AS ALBUTEROL): ONCE A WEEK OR LESS
QUESTION_1 LAST FOUR WEEKS HOW MUCH OF THE TIME DID YOUR ASTHMA KEEP YOU FROM GETTING AS MUCH DONE AT WORK, SCHOOL OR AT HOME: NONE OF THE TIME
QUESTION_3 LAST FOUR WEEKS HOW OFTEN DID YOUR ASTHMA SYMPTOMS (WHEEZING, COUGHING, SHORTNESS OF BREATH, CHEST TIGHTNESS OR PAIN) WAKE YOU UP AT NIGHT OR EARLIER THAN USUAL IN THE MORNING: NOT AT ALL
ACT_TOTALSCORE: 23
QUESTION_5 LAST FOUR WEEKS HOW WOULD YOU RATE YOUR ASTHMA CONTROL: WELL CONTROLLED

## 2024-06-01 ASSESSMENT — ANXIETY QUESTIONNAIRES
5. BEING SO RESTLESS THAT IT IS HARD TO SIT STILL: NOT AT ALL
6. BECOMING EASILY ANNOYED OR IRRITABLE: SEVERAL DAYS
1. FEELING NERVOUS, ANXIOUS, OR ON EDGE: NOT AT ALL
2. NOT BEING ABLE TO STOP OR CONTROL WORRYING: NOT AT ALL
7. FEELING AFRAID AS IF SOMETHING AWFUL MIGHT HAPPEN: NOT AT ALL
8. IF YOU CHECKED OFF ANY PROBLEMS, HOW DIFFICULT HAVE THESE MADE IT FOR YOU TO DO YOUR WORK, TAKE CARE OF THINGS AT HOME, OR GET ALONG WITH OTHER PEOPLE?: NOT DIFFICULT AT ALL
GAD7 TOTAL SCORE: 2
7. FEELING AFRAID AS IF SOMETHING AWFUL MIGHT HAPPEN: NOT AT ALL
4. TROUBLE RELAXING: NOT AT ALL
3. WORRYING TOO MUCH ABOUT DIFFERENT THINGS: SEVERAL DAYS
IF YOU CHECKED OFF ANY PROBLEMS ON THIS QUESTIONNAIRE, HOW DIFFICULT HAVE THESE PROBLEMS MADE IT FOR YOU TO DO YOUR WORK, TAKE CARE OF THINGS AT HOME, OR GET ALONG WITH OTHER PEOPLE: NOT DIFFICULT AT ALL
GAD7 TOTAL SCORE: 2

## 2024-06-01 ASSESSMENT — SOCIAL DETERMINANTS OF HEALTH (SDOH): HOW OFTEN DO YOU GET TOGETHER WITH FRIENDS OR RELATIVES?: TWICE A WEEK

## 2024-06-06 ENCOUNTER — OFFICE VISIT (OUTPATIENT)
Dept: INTERNAL MEDICINE | Facility: OTHER | Age: 64
End: 2024-06-06
Attending: INTERNAL MEDICINE
Payer: COMMERCIAL

## 2024-06-06 ENCOUNTER — TELEPHONE (OUTPATIENT)
Dept: INTERNAL MEDICINE | Facility: OTHER | Age: 64
End: 2024-06-06

## 2024-06-06 VITALS
RESPIRATION RATE: 16 BRPM | DIASTOLIC BLOOD PRESSURE: 88 MMHG | SYSTOLIC BLOOD PRESSURE: 132 MMHG | WEIGHT: 177.6 LBS | TEMPERATURE: 98.5 F | HEART RATE: 62 BPM | BODY MASS INDEX: 33.53 KG/M2 | HEIGHT: 61 IN

## 2024-06-06 DIAGNOSIS — J45.30 MILD PERSISTENT ASTHMA WITHOUT COMPLICATION: ICD-10-CM

## 2024-06-06 DIAGNOSIS — Z12.31 ENCOUNTER FOR SCREENING MAMMOGRAM FOR BREAST CANCER: ICD-10-CM

## 2024-06-06 DIAGNOSIS — Z91.89 AT RISK FOR HEART DISEASE: ICD-10-CM

## 2024-06-06 DIAGNOSIS — Z71.85 VACCINE COUNSELING: ICD-10-CM

## 2024-06-06 DIAGNOSIS — K21.00 GASTROESOPHAGEAL REFLUX DISEASE WITH ESOPHAGITIS WITHOUT HEMORRHAGE: ICD-10-CM

## 2024-06-06 DIAGNOSIS — E53.8 LOW SERUM VITAMIN B12: ICD-10-CM

## 2024-06-06 DIAGNOSIS — F41.1 GAD (GENERALIZED ANXIETY DISORDER): ICD-10-CM

## 2024-06-06 DIAGNOSIS — Z13.1 SCREENING FOR DIABETES MELLITUS: ICD-10-CM

## 2024-06-06 DIAGNOSIS — E66.01 MORBID OBESITY (H): ICD-10-CM

## 2024-06-06 DIAGNOSIS — Z86.79 HISTORY OF SUPRAVENTRICULAR TACHYCARDIA: ICD-10-CM

## 2024-06-06 DIAGNOSIS — E78.2 MIXED HYPERLIPIDEMIA: ICD-10-CM

## 2024-06-06 DIAGNOSIS — Z00.00 ENCOUNTER FOR MEDICARE ANNUAL WELLNESS EXAM: Primary | ICD-10-CM

## 2024-06-06 LAB
ALBUMIN SERPL BCG-MCNC: 4.8 G/DL (ref 3.5–5.2)
ALP SERPL-CCNC: 80 U/L (ref 40–150)
ALT SERPL W P-5'-P-CCNC: 51 U/L (ref 0–50)
ANION GAP SERPL CALCULATED.3IONS-SCNC: 13 MMOL/L (ref 7–15)
AST SERPL W P-5'-P-CCNC: 42 U/L (ref 0–45)
BILIRUB SERPL-MCNC: 0.4 MG/DL
BUN SERPL-MCNC: 15.5 MG/DL (ref 8–23)
CALCIUM SERPL-MCNC: 10 MG/DL (ref 8.8–10.2)
CHLORIDE SERPL-SCNC: 101 MMOL/L (ref 98–107)
CHOLEST SERPL-MCNC: 226 MG/DL
CREAT SERPL-MCNC: 0.57 MG/DL (ref 0.51–0.95)
DEPRECATED HCO3 PLAS-SCNC: 24 MMOL/L (ref 22–29)
EGFRCR SERPLBLD CKD-EPI 2021: >90 ML/MIN/1.73M2
ERYTHROCYTE [DISTWIDTH] IN BLOOD BY AUTOMATED COUNT: 13.7 % (ref 10–15)
FASTING STATUS PATIENT QL REPORTED: NO
FASTING STATUS PATIENT QL REPORTED: NO
GLUCOSE SERPL-MCNC: 115 MG/DL (ref 70–99)
HCT VFR BLD AUTO: 43.3 % (ref 35–47)
HDLC SERPL-MCNC: 83 MG/DL
HGB BLD-MCNC: 14.4 G/DL (ref 11.7–15.7)
LDLC SERPL CALC-MCNC: 115 MG/DL
MAGNESIUM SERPL-MCNC: 1.9 MG/DL (ref 1.7–2.3)
MCH RBC QN AUTO: 31 PG (ref 26.5–33)
MCHC RBC AUTO-ENTMCNC: 33.3 G/DL (ref 31.5–36.5)
MCV RBC AUTO: 93 FL (ref 78–100)
NONHDLC SERPL-MCNC: 143 MG/DL
PLATELET # BLD AUTO: 223 10E3/UL (ref 150–450)
POTASSIUM SERPL-SCNC: 4 MMOL/L (ref 3.4–5.3)
PROT SERPL-MCNC: 7.9 G/DL (ref 6.4–8.3)
RBC # BLD AUTO: 4.65 10E6/UL (ref 3.8–5.2)
SODIUM SERPL-SCNC: 138 MMOL/L (ref 135–145)
TRIGL SERPL-MCNC: 138 MG/DL
TSH SERPL DL<=0.005 MIU/L-ACNC: 0.82 UIU/ML (ref 0.3–4.2)
VIT B12 SERPL-MCNC: 977 PG/ML (ref 232–1245)
WBC # BLD AUTO: 6.5 10E3/UL (ref 4–11)

## 2024-06-06 PROCEDURE — 99397 PER PM REEVAL EST PAT 65+ YR: CPT | Performed by: INTERNAL MEDICINE

## 2024-06-06 PROCEDURE — 80061 LIPID PANEL: CPT | Mod: ZL | Performed by: INTERNAL MEDICINE

## 2024-06-06 PROCEDURE — 83735 ASSAY OF MAGNESIUM: CPT | Mod: ZL | Performed by: INTERNAL MEDICINE

## 2024-06-06 PROCEDURE — 80053 COMPREHEN METABOLIC PANEL: CPT | Mod: ZL | Performed by: INTERNAL MEDICINE

## 2024-06-06 PROCEDURE — 85027 COMPLETE CBC AUTOMATED: CPT | Mod: ZL | Performed by: INTERNAL MEDICINE

## 2024-06-06 PROCEDURE — 84443 ASSAY THYROID STIM HORMONE: CPT | Mod: ZL | Performed by: INTERNAL MEDICINE

## 2024-06-06 PROCEDURE — 82607 VITAMIN B-12: CPT | Mod: ZL | Performed by: INTERNAL MEDICINE

## 2024-06-06 PROCEDURE — 36415 COLL VENOUS BLD VENIPUNCTURE: CPT | Mod: ZL | Performed by: INTERNAL MEDICINE

## 2024-06-06 RX ORDER — METOPROLOL SUCCINATE 25 MG/1
25 TABLET, EXTENDED RELEASE ORAL DAILY
Qty: 90 TABLET | Refills: 4 | Status: SHIPPED | OUTPATIENT
Start: 2024-06-06

## 2024-06-06 RX ORDER — FLUOXETINE 10 MG/1
10 CAPSULE ORAL DAILY
Qty: 90 CAPSULE | Refills: 4 | Status: SHIPPED | OUTPATIENT
Start: 2024-06-06

## 2024-06-06 RX ORDER — MONTELUKAST SODIUM 10 MG/1
1 TABLET ORAL DAILY
Qty: 90 TABLET | Refills: 4 | Status: SHIPPED | OUTPATIENT
Start: 2024-06-06

## 2024-06-06 RX ORDER — OMEPRAZOLE 20 MG/1
20 TABLET, DELAYED RELEASE ORAL DAILY
COMMUNITY

## 2024-06-06 RX ORDER — SUCRALFATE 1 G/1
1 TABLET ORAL 3 TIMES DAILY
Qty: 270 TABLET | Refills: 4 | Status: SHIPPED | OUTPATIENT
Start: 2024-06-06

## 2024-06-06 RX ORDER — ROSUVASTATIN CALCIUM 20 MG/1
20 TABLET, COATED ORAL AT BEDTIME
Qty: 90 TABLET | Refills: 4 | Status: SHIPPED | OUTPATIENT
Start: 2024-06-06

## 2024-06-06 RX ORDER — RESPIRATORY SYNCYTIAL VIRUS VACCINE 120MCG/0.5
0.5 KIT INTRAMUSCULAR ONCE
Status: SHIPPED
Start: 2024-06-06 | End: 2024-06-06

## 2024-06-06 ASSESSMENT — PATIENT HEALTH QUESTIONNAIRE - PHQ9
10. IF YOU CHECKED OFF ANY PROBLEMS, HOW DIFFICULT HAVE THESE PROBLEMS MADE IT FOR YOU TO DO YOUR WORK, TAKE CARE OF THINGS AT HOME, OR GET ALONG WITH OTHER PEOPLE: NOT DIFFICULT AT ALL
SUM OF ALL RESPONSES TO PHQ QUESTIONS 1-9: 5

## 2024-06-06 ASSESSMENT — ASTHMA QUESTIONNAIRES: ACT_TOTALSCORE: 23

## 2024-06-06 ASSESSMENT — ANXIETY QUESTIONNAIRES: GAD7 TOTAL SCORE: 2

## 2024-06-06 ASSESSMENT — PAIN SCALES - GENERAL: PAINLEVEL: MODERATE PAIN (4)

## 2024-06-06 NOTE — PROGRESS NOTES
"Preventive Care Visit  [facility] AND [facility]  Noris Medellin DO, Internal Medicine  Jun 6, 2024      Wellness Visit Notes:    -Last mammo done 9/12/18, MRI- bilateral 10/24/22, due now (impression: No MR evidence of malignancy in either breast. Continue annual screenings.)    -Last DEXA done 9/13/18, due 9/13/33 (impression: osteopenia) *    -Last colon cancer screening done 9/15/23, due 9/15/2028 (impression: use carafate and follow up)    -Immunizations: RSV in the fall, Covid in the fall.    -ACP: Not on File.    Annual labs ordered. Medications renewed.     Had a cardiac workup at [facility]- everything is good.     Feels like she may want to stop Prozac.     Genetic: Lipoprotein A per [facility].    Achy and tired.       BMI  Estimated body mass index is 34.11 kg/m  as calculated from the following:    Height as of this encounter: 1.537 m (5' 0.5\").    Weight as of this encounter: 80.6 kg (177 lb 9.6 oz).   Weight management plan: Discussed healthy diet and exercise guidelines    Counseling  Appropriate preventive services were discussed with this patient, including applicable screening as appropriate for fall prevention, nutrition, physical activity, Tobacco-use cessation, weight loss and cognition.  Checklist reviewing preventive services available has been given to the patient.  Reviewed patient's diet, addressing concerns and/or questions.   She is at risk for lack of exercise and has been provided with information to increase physical activity for the benefit of her well-being.   Discussed possible causes of fatigue. The patient was provided with written information regarding signs of hearing loss.   The patient's PHQ-9 score is consistent with mild depression. She was provided with information regarding depression.     Work on weight loss  Regular exercise    Return in about 1 year (around 6/6/2025) for Annual Review with renewal of all medications, and as needed sooner.      Subjective   Nikki is a 64 " year old, presenting for the following:  Medicare Visit        6/6/2024    10:26 AM   Additional Questions   Roomed by DARIN Valera   Accompanied by SADI         Health Care Directive  Patient does not have a Health Care Directive or Living Will: Patient states has Advance Directive and will bring in a copy to clinic.        6/1/2024   General Health   How would you rate your overall physical health? (!) DECLINE   Feel stress (tense, anxious, or unable to sleep) Very much   (!) STRESS CONCERN      6/1/2024   Nutrition   Diet: Regular (no restrictions)         6/1/2024   Exercise   Days per week of moderate/strenous exercise 2 days   Average minutes spent exercising at this level 30 min   (!) EXERCISE CONCERN      6/1/2024   Social Factors   Frequency of gathering with friends or relatives Twice a week   Worry food won't last until get money to buy more No   Food not last or not have enough money for food? No   Do you have housing?  Yes   Are you worried about losing your housing? No   Lack of transportation? No   Unable to get utilities (heat,electricity)? No         6/1/2024   Fall Risk   Fallen 2 or more times in the past year? No    No   Trouble with walking or balance? No    No          6/1/2024   Activities of Daily Living- Home Safety   Needs help with the following daily activites None of the above   Safety concerns in the home None of the above         6/1/2024   Dental   Dentist two times every year? Yes         6/1/2024   Hearing Screening   Hearing concerns? (!) IT'S HARD TO FOLLOW A CONVERSATION IN A NOISY RESTAURANT OR CROWDED ROOM.    (!) TROUBLE UNDERSTANDING SOFT OR WHISPERED SPEECH.         6/1/2024   Driving Risk Screening   Patient/family members have concerns about driving No         6/1/2024   General Alertness/Fatigue Screening   Have you been more tired than usual lately? (!) YES         6/1/2024   Urinary Incontinence Screening   Bothered by leaking urine in past 6 months No         6/1/2024   TB  Screening   Were you born outside of the [place]? No       Today's PHQ-9 Score:       6/1/2024     2:28 PM   PHQ-9 SCORE   PHQ-9 Total Score MyChart 5 (Mild depression)   PHQ-9 Total Score 5         6/1/2024   Substance Use   Alcohol more than 3/day or more than 7/wk No   Do you have a current opioid prescription? No   How severe/bad is pain from 1 to 10? 4/10   Do you use any other substances recreationally? No     Social History     Tobacco Use    Smoking status: Never    Smokeless tobacco: Never   Vaping Use    Vaping status: Never Used   Substance Use Topics    Alcohol use: Yes     Alcohol/week: 0.0 standard drinks of alcohol     Comment: 3 per week    Drug use: No          Need MRI's for Mammo. Goes to [facility] for imaging.       History of abnormal Pap smear: No - age 65 or older with adequate negative prior screening test results (3 consecutive negative cytology results, 2 consecutive negative cotesting results, or 2 consecutive negative HrHPV test results within 10 years, with the most recent test occurring within the recommended screening interval for the test used)       ASCVD Risk   The 10-year ASCVD risk score (Nilam ABBASI, et al., 2019) is: 6%    Values used to calculate the score:      Age: 64 years      Sex: Female      Is Non- : No      Diabetic: No      Tobacco smoker: No      Systolic Blood Pressure: 132 mmHg      Is BP treated: No      HDL Cholesterol: 83 mg/dL      Total Cholesterol: 226 mg/dL          Reviewed and updated as needed this visit by Provider   Tobacco  Allergies  Meds  Problems  Med Hx  Surg Hx  Fam Hx  Soc   Hx Sexual Activity        Current providers sharing in care for this patient include:  Patient Care Team:  Noris Medellin DO as PCP - General (Internal Medicine)  Maren Hameed APRN CNP as Nurse Practitioner (Internal Medicine)  Belen Warren APRN CNP as Assigned Heart and Vascular Provider  Noris Medellin DO as Assigned PCP    The  "following health maintenance items are reviewed in Epic and correct as of today:  Health Maintenance   Topic Date Due    RSV VACCINE (Pregnancy & 60+) (1 - 1-dose 60+ series) Never done    MAMMO SCREENING  09/12/2020    ASTHMA ACTION PLAN  12/07/2023    COVID-19 Vaccine (7 - 2023-24 season) 10/01/2024 (Originally 2/19/2024)    ASTHMA CONTROL TEST  12/06/2024    MEDICARE ANNUAL WELLNESS VISIT  06/06/2025    LIPID  06/06/2025    FALL RISK ASSESSMENT  06/06/2025    GLUCOSE  06/06/2027    COLORECTAL CANCER SCREENING  08/10/2027    DTAP/TDAP/TD IMMUNIZATION (5 - Td or Tdap) 09/12/2028    ADVANCE CARE PLANNING  06/06/2029    DEXA  09/13/2033    HEPATITIS C SCREENING  Completed    PHQ-2 (once per calendar year)  Completed    INFLUENZA VACCINE  Completed    Pneumococcal Vaccine: 65+ Years  Completed    ZOSTER IMMUNIZATION  Completed    IPV IMMUNIZATION  Aged Out    HPV IMMUNIZATION  Aged Out    MENINGITIS IMMUNIZATION  Aged Out    RSV MONOCLONAL ANTIBODY  Aged Out    PAP  Discontinued        Objective    Exam  /88 (BP Location: Right arm, Patient Position: Sitting, Cuff Size: Adult Large)   Pulse 62   Temp 98.5  F (36.9  C) (Temporal)   Resp 16   Ht 1.537 m (5' 0.5\")   Wt 80.6 kg (177 lb 9.6 oz)   LMP 01/01/2004 (Approximate)   Breastfeeding No   BMI 34.11 kg/m     Estimated body mass index is 34.11 kg/m  as calculated from the following:    Height as of this encounter: 1.537 m (5' 0.5\").    Weight as of this encounter: 80.6 kg (177 lb 9.6 oz).             Signed Electronically by: Noris Medellin, DO    Answers submitted by the patient for this visit:  Patient Health Questionnaire (Submitted on 6/1/2024)  If you checked off any problems, how difficult have these problems made it for you to do your work, take care of things at home, or get along with other people?: Not difficult at all  PHQ9 TOTAL SCORE: 5  MODESTO-7 (Submitted on 6/1/2024)  MODESTO 7 TOTAL SCORE: 2    "

## 2024-06-06 NOTE — TELEPHONE ENCOUNTER
Pharmacy faxed requesting alternative for Wegovy0.25mg not covered by insurance. Janet Brooke LPN .......................6/6/2024  2:03 PM

## 2024-06-06 NOTE — PROGRESS NOTES
Assessment & Plan     Vaccine counseling  Encouraged to get in the fall  - respiratory syncytial virus vaccine, bivalent (ABRYSVO) injection; Inject 0.5 mLs into the muscle once for 1 dose    MODESTO (generalized anxiety disorder)  - Controlled.  Continue current regimen.    - FLUoxetine (PROZAC) 10 MG capsule; Take 1 capsule (10 mg) by mouth daily  - TSH with free T4 reflex    History of supraventricular tachycardia  - Controlled.  Continue current regimen.    - metoprolol succinate ER (TOPROL XL) 25 MG 24 hr tablet; Take 1 tablet (25 mg) by mouth daily    Mild persistent asthma without complication  - Controlled.  Continue current regimen.    - montelukast (SINGULAIR) 10 MG tablet; Take 1 tablet (10 mg) by mouth daily    Mixed hyperlipidemia  - Controlled.  Continue current regimen.    - rosuvastatin (CRESTOR) 20 MG tablet; Take 1 tablet (20 mg) by mouth at bedtime  - Lipid Profile    Gastroesophageal reflux disease with esophagitis without hemorrhage  Current medications appear to be working.  Continue at this time.  - sucralfate (CARAFATE) 1 GM tablet; Take 1 tablet (1 g) by mouth 3 times daily  - omeprazole (PRILOSEC OTC) 20 MG EC tablet; Take 20 mg by mouth daily  - CBC with platelets  - Magnesium    Morbid obesity (H)  We had a long discussion regarding weight loss and we will see if her insurance covers GLP-1 agonist.  If not she can look into alternative options for this.  It is recommended for both weight loss and heart protection given her family history.  - Semaglutide-Weight Management (WEGOVY) 0.25 MG/0.5ML pen; Inject 0.25 mg Subcutaneous once a week For 4 weeks and then increase to 0.5mg dose  - Semaglutide-Weight Management (WEGOVY) 0.5 MG/0.5ML pen; Inject 0.5 mg Subcutaneous once a week Start after completing the 0.25mg dose  - Semaglutide-Weight Management (WEGOVY) 1 MG/0.5ML pen; Inject 1 mg Subcutaneous once a week Start after completing the 0.5mg dose    At risk for heart disease  See above  -  Semaglutide-Weight Management (WEGOVY) 0.25 MG/0.5ML pen; Inject 0.25 mg Subcutaneous once a week For 4 weeks and then increase to 0.5mg dose  - Semaglutide-Weight Management (WEGOVY) 0.5 MG/0.5ML pen; Inject 0.5 mg Subcutaneous once a week Start after completing the 0.25mg dose  - Semaglutide-Weight Management (WEGOVY) 1 MG/0.5ML pen; Inject 1 mg Subcutaneous once a week Start after completing the 0.5mg dose    Low serum vitamin B12  Recheck with labs today and adjust supplementation as indicated  - Vitamin B12    Encounter for screening mammogram for breast cancer  - MA Screening Bilateral w/ Fili; Future    Screening for diabetes mellitus  - Comprehensive metabolic panel      Return in about 1 year (around 6/6/2025) for Annual Review with renewal of all medications, and as needed sooner.    Josue Jordan is a 64 year old, presenting for the following health issues:  Medicare Visit        6/6/2024    10:26 AM   Additional Questions   Roomed by DARIN Valera   Accompanied by SADI HENSON     Patient presents for follow-up of her annual medications.  She reports that she has been very fatigued.  She has been walking regularly and trying to lose weight and has struggled with this.  She was vitamin B-12 deficient last fall.    She is concerned regarding her family history of heart disease.  She has multiple siblings who have had cardiac issues along with her parents.  She is on medications including rosuvastatin and metoprolol.  She denies any side effects from these and does need refills.    She also needs refills of her sucralfate which she is using and seems to help with her acid reflux, montelukast for her allergies and Prozac for her mood.    She is frustrated with her lack of weight loss and is interested in trying something to help with this as she feels this is important for her cardiac health.    ROS:  CONSTITUTIONAL: Negative for fever, chills, slight increase in weight; some night  "sweats  INTEGUMENTARY/SKIN/LYMPH: Negative for worrisome rashes, moles or lesions; swollen lymph nodes - sees Derm  EYES: Negative for significant vision changes or irritation  ENT: Negative for additional ear, mouth and throat problems  RESP: Negative for significant cough or SOB  CV: Negative for chest pain, palpitations or increased peripheral edema  GI: Negative for abdominal pain, or change in bowel habits or blood in the stools/black stools  : Negative for unusual urinary or vaginal symptoms. No vaginal bleeding.  MUSCULOSKELETAL: Some hand pain  NEURO: Negative for new headaches, weakness or paraesthesias; some chronic headaches  PSYCHIATRIC: Negative for changes in mood or affect; significant anxiety or depression        Objective    /88 (BP Location: Right arm, Patient Position: Sitting, Cuff Size: Adult Large)   Pulse 62   Temp 98.5  F (36.9  C) (Temporal)   Resp 16   Ht 1.537 m (5' 0.5\")   Wt 80.6 kg (177 lb 9.6 oz)   LMP 01/01/2004 (Approximate)   Breastfeeding No   BMI 34.11 kg/m    Body mass index is 34.11 kg/m .  Physical Exam   GEN: Vitals reviewed. Healthy appearing. Patient is in no acute distress. Cooperative with exam.  HEENT: Normocephalic atraumatic.  Eyes grossly normal to inspection.  No discharge or erythema, or obvious scleral/conjunctival abnormalities. Oropharynx with no erythema or exudates. Dentition adequate.  EACs clear bilaterally, TM gray with normal landmarks.  NECK: Supple; no thyromegaly or masses noted.  No cervical or supraclavicular lymphadenopathy.  CV: Heart regular in rate and rhythm with no murmur.    LUNGS: No audible wheeze, cough, or visible cyanosis.  No visible retractions or increased work of breathing.  Lungs clear to auscultation bilaterally.    ABD:  Obese, nondistended  SKIN: Warm and dry to touch.  Visible skin clear. No significant rash, abnormal pigmentation or lesions.  EXT: No clubbing or cyanosis.  No peripheral edema.  NEURO: Alert and " oriented to person, place, and time.  Cranial nerves II-XII grossly intact with no focal or lateralizing deficits.  Muscle tone normal.  Gait normal. No tremor.   MSK: ROM of upper and lower ext symmetric and full.  PSYCH: Mood is good.  Mentation appears normal, affect normal/bright, judgement and insight intact, normal speech and appearance well-groomed.          Signed Electronically by: Noris Medellin,

## 2024-06-06 NOTE — TELEPHONE ENCOUNTER
Please have patient contact their insurance to determine the next steps or if there are alternative medications that are covered by their insurance. We can help assist with any required paperwork or information if needed.

## 2024-06-07 ENCOUNTER — TELEPHONE (OUTPATIENT)
Dept: INTERNAL MEDICINE | Facility: OTHER | Age: 64
End: 2024-06-07
Payer: COMMERCIAL

## 2024-06-07 NOTE — TELEPHONE ENCOUNTER
Patient informed of providers note and will reach out to her insurance company to see what prescription is covered.  Kellie Grove LPN

## 2024-06-10 ENCOUNTER — MYC MEDICAL ADVICE (OUTPATIENT)
Dept: INTERNAL MEDICINE | Facility: OTHER | Age: 64
End: 2024-06-10
Payer: COMMERCIAL

## 2024-09-05 ENCOUNTER — MYC MEDICAL ADVICE (OUTPATIENT)
Dept: INTERNAL MEDICINE | Facility: OTHER | Age: 64
End: 2024-09-05
Payer: COMMERCIAL

## 2024-09-05 DIAGNOSIS — E78.5 HYPERLIPIDEMIA: ICD-10-CM

## 2024-09-05 DIAGNOSIS — K76.0 FATTY LIVER DISEASE, NONALCOHOLIC: ICD-10-CM

## 2024-09-05 DIAGNOSIS — Z91.89 AT RISK FOR DYSFUNCTION OF HEART: ICD-10-CM

## 2024-09-05 DIAGNOSIS — E66.01 MORBID OBESITY (H): Primary | ICD-10-CM

## 2024-09-05 DIAGNOSIS — Z91.89 AT RISK FOR HEART DISEASE: ICD-10-CM

## 2024-09-05 NOTE — TELEPHONE ENCOUNTER
Agree with response- metformin would be beneficial with her fatty liver and I can prescribe this if she is willing.

## 2024-09-05 NOTE — TELEPHONE ENCOUNTER
My Thoughts for Response:  Unfortunately, many insurance companies have moved to not covering these medications for anything besides Type 2 Diabetes.  You have done everything you can in presenting your case to your insurnace company through appeals, and if they continue to deny coverage, there is nothing further that we can do.      The only other options would be paying out of pocket (although I realize this is not possible for most patients) or looking in to third parties that are providing these medications (often this is still significant out of pocket cost- $500-$600/month).  Otherwise- we could provide you with a weight management referral ([facility] is the closest system that offers this).      She hasn't tried Metformin.  Don't know if that would be recommended as an option?      Tea Maradiaga RN on 9/5/2024 at 3:31 PM

## 2024-09-06 RX ORDER — METFORMIN HCL 500 MG
500 TABLET, EXTENDED RELEASE 24 HR ORAL
Qty: 90 TABLET | Refills: 4 | Status: SHIPPED | OUTPATIENT
Start: 2024-09-06

## 2024-09-06 NOTE — TELEPHONE ENCOUNTER
Patient's biggest concern is risk reduction of cardiac/stroke risk factors.     Willy'd up Metformin XR but didn't know if starting dose for weight loss was daily or BID.     Tea Maradiaga RN on 9/6/2024 at 12:40 PM

## 2024-09-06 NOTE — TELEPHONE ENCOUNTER
I sent in the extended release to hopefully cut down on those symptoms- Obesity will increase cardiovascular risk.     Your cardiovascular risk score is 6%. This risk score is calculated based on age, blood pressure, cholesterol level, smoking history, current medications, and whether you are a diabetic or not. This is your calculated percent risk of having a cardiovascular event such as a heart attack or stroke within the next 10 years.     Individuals are preliminarily classified based on estimated risk: 10-year ASCVD risk <5% is low risk; 5%-7.5% is borderline risk; 7.5-20% is intermediate risk, and ?20% is high risk. High risk individuals should be strongly recommended statin therapy on the basis of risk alone after a clinician patient risk discussion.     The 10-year ASCVD risk score (Nilam ABBASI, et al., 2019) is: 6%    Values used to calculate the score:      Age: 64 years      Sex: Female      Is Non- : No      Diabetic: No      Tobacco smoker: No      Systolic Blood Pressure: 132 mmHg      Is BP treated: No      HDL Cholesterol: 83 mg/dL      Total Cholesterol: 226 mg/dL

## 2025-02-09 ENCOUNTER — HEALTH MAINTENANCE LETTER (OUTPATIENT)
Age: 65
End: 2025-02-09

## 2025-02-24 ENCOUNTER — MYC MEDICAL ADVICE (OUTPATIENT)
Dept: INTERNAL MEDICINE | Facility: OTHER | Age: 65
End: 2025-02-24
Payer: MEDICARE

## 2025-02-24 DIAGNOSIS — E66.01 MORBID OBESITY (H): ICD-10-CM

## 2025-02-24 DIAGNOSIS — K76.0 FATTY LIVER DISEASE, NONALCOHOLIC: ICD-10-CM

## 2025-02-24 DIAGNOSIS — R73.03 PREDIABETES: ICD-10-CM

## 2025-02-24 DIAGNOSIS — E78.5 HYPERLIPIDEMIA: ICD-10-CM

## 2025-02-24 DIAGNOSIS — Z91.89 AT RISK FOR HEART DISEASE: Primary | ICD-10-CM

## 2025-02-24 NOTE — TELEPHONE ENCOUNTER
"Patient wondering about trying for Wegovy again for \"cardiovascular disease\".     Denied last year x2 appeals.     Now has Medicare and [employer].      Additional family member (sister) with stroke this past year.     BM 34.6.      Tea Maradiaga RN on 2/24/2025 at 3:57 PM    "

## 2025-02-25 NOTE — TELEPHONE ENCOUNTER
Orders were signed for Wegovy. Please let patient know.  JASON BESS, MAKSIM CNP on 2/25/2025 at 9:56 AM

## 2025-02-25 NOTE — TELEPHONE ENCOUNTER
Printed insurance cards.     Brought up to [facility] to update in chart.     Ana Laura Rojas RN on 2/25/2025 at 8:47 AM

## 2025-02-25 NOTE — TELEPHONE ENCOUNTER
Wegovy originally ordered in June. Insurance at the time did not cover. Has new insurance that will. Requesting rx be resent.     Willy'd up Wegovy Orders for [facility] per pt request.    Updated Diagnosis     Routing to provider to review and respond.  Ana Laura Rojas RN on 2/25/2025 at 9:16 AM

## 2025-03-20 ENCOUNTER — MYC MEDICAL ADVICE (OUTPATIENT)
Dept: INTERNAL MEDICINE | Facility: OTHER | Age: 65
End: 2025-03-20
Payer: MEDICARE

## 2025-03-20 NOTE — TELEPHONE ENCOUNTER
Called [facility] prescription drug plan at [phone]    José Miguel AVENDANO [phone]        Did PA over phone.     Fax supporting documents to:  [phone]    Documents faxed    Ana Laura Rojas RN on 3/20/2025 at 4:38 PM

## 2025-03-20 NOTE — TELEPHONE ENCOUNTER
Called [facility] in [place], [place] to look in to Wegovy PA.    PA failed.  Call help desk at [phone].      Will need to call insurance to check on Wegovy PA.  Process over Phone?      [employer] have no info or record of doing PA.      Prescribing Provider's NPI: [id number]  JASON BESS  Member ID: [id number]       _____________________________________________________________    Reached out to [employer] to inquire about PA on Wegovy ordered 2/25.      From [employer]:  It looks like her pharmacy is one in [place]? I don't see that this was ever received or submitted     Tea Maradiaga, RN on 3/20/2025 at 11:27 AM

## 2025-05-11 ENCOUNTER — MYC MEDICAL ADVICE (OUTPATIENT)
Dept: INTERNAL MEDICINE | Facility: OTHER | Age: 65
End: 2025-05-11
Payer: MEDICARE

## (undated) DEVICE — ENDO FORCEP ENDOJAW BIOPSY 2.8MMX230CM FB-220U

## (undated) DEVICE — ENDO SNARE EXACTO COLD 9MM LOOP 2.4MMX230CM 00711115

## (undated) DEVICE — SOL WATER 1500ML

## (undated) DEVICE — TUBING SUCTION 10'X3/16" N510

## (undated) DEVICE — SUCTION MANIFOLD NEPTUNE 2 SYS 4 PORT 0702-020-000

## (undated) DEVICE — ENDO KIT COMPLIANCE DYKENDOCMPLY

## (undated) DEVICE — ENDO BITE BLOCK 60 MAXI LF 00712804

## (undated) DEVICE — ENDO TRAP POLYP E-TRAP 00711099

## (undated) DEVICE — HEMOCLIP QUICKCLIP PRO OLYMPUS 230CM HX-202UR.B

## (undated) DEVICE — ENDO BRUSH CHANNEL MASTER CLEANING 2-4.2MM BW-412T

## (undated) DEVICE — SYR 50ML LL W/O NDL 309653

## (undated) RX ORDER — LIDOCAINE HYDROCHLORIDE 20 MG/ML
INJECTION, SOLUTION EPIDURAL; INFILTRATION; INTRACAUDAL; PERINEURAL
Status: DISPENSED
Start: 2022-08-10

## (undated) RX ORDER — KETOROLAC TROMETHAMINE 30 MG/ML
INJECTION, SOLUTION INTRAMUSCULAR; INTRAVENOUS
Status: DISPENSED
Start: 2021-04-14

## (undated) RX ORDER — MECLIZINE HCL 12.5 MG 12.5 MG/1
TABLET ORAL
Status: DISPENSED
Start: 2023-07-10

## (undated) RX ORDER — PROPOFOL 10 MG/ML
INJECTION, EMULSION INTRAVENOUS
Status: DISPENSED
Start: 2021-06-09

## (undated) RX ORDER — REGADENOSON 0.08 MG/ML
INJECTION, SOLUTION INTRAVENOUS
Status: DISPENSED
Start: 2023-09-13

## (undated) RX ORDER — ONDANSETRON 2 MG/ML
INJECTION INTRAMUSCULAR; INTRAVENOUS
Status: DISPENSED
Start: 2021-04-14

## (undated) RX ORDER — PROPOFOL 10 MG/ML
INJECTION, EMULSION INTRAVENOUS
Status: DISPENSED
Start: 2022-08-10

## (undated) RX ORDER — LIDOCAINE HYDROCHLORIDE 10 MG/ML
INJECTION, SOLUTION EPIDURAL; INFILTRATION; INTRACAUDAL; PERINEURAL
Status: DISPENSED
Start: 2022-08-10

## (undated) RX ORDER — SODIUM CHLORIDE 9 MG/ML
INJECTION, SOLUTION INTRAVENOUS
Status: DISPENSED
Start: 2021-04-14

## (undated) RX ORDER — HYDROMORPHONE HYDROCHLORIDE 1 MG/ML
INJECTION, SOLUTION INTRAMUSCULAR; INTRAVENOUS; SUBCUTANEOUS
Status: DISPENSED
Start: 2021-04-14

## (undated) RX ORDER — LIDOCAINE HYDROCHLORIDE 20 MG/ML
INJECTION, SOLUTION EPIDURAL; INFILTRATION; INTRACAUDAL; PERINEURAL
Status: DISPENSED
Start: 2021-06-09